# Patient Record
Sex: FEMALE | Race: BLACK OR AFRICAN AMERICAN | NOT HISPANIC OR LATINO | Employment: FULL TIME | URBAN - METROPOLITAN AREA
[De-identification: names, ages, dates, MRNs, and addresses within clinical notes are randomized per-mention and may not be internally consistent; named-entity substitution may affect disease eponyms.]

---

## 2017-02-07 ENCOUNTER — OFFICE VISIT (OUTPATIENT)
Dept: NEUROLOGY | Facility: CLINIC | Age: 42
End: 2017-02-07

## 2017-02-07 ENCOUNTER — APPOINTMENT (OUTPATIENT)
Dept: LAB | Facility: HOSPITAL | Age: 42
End: 2017-02-07

## 2017-02-07 VITALS
WEIGHT: 190 LBS | DIASTOLIC BLOOD PRESSURE: 80 MMHG | HEIGHT: 62 IN | SYSTOLIC BLOOD PRESSURE: 130 MMHG | BODY MASS INDEX: 34.96 KG/M2

## 2017-02-07 DIAGNOSIS — M54.16 LUMBAR RADICULOPATHY: ICD-10-CM

## 2017-02-07 DIAGNOSIS — R29.898 LEFT LEG WEAKNESS: Primary | ICD-10-CM

## 2017-02-07 DIAGNOSIS — R29.2 HYPERREFLEXIA OF LOWER EXTREMITY: ICD-10-CM

## 2017-02-07 LAB
ALBUMIN SERPL-MCNC: 4 G/DL (ref 3.2–4.8)
ALBUMIN/GLOB SERPL: 1.1 G/DL (ref 1.5–2.5)
ALP SERPL-CCNC: 111 U/L (ref 25–100)
ALT SERPL W P-5'-P-CCNC: 18 U/L (ref 7–40)
ANION GAP SERPL CALCULATED.3IONS-SCNC: 7 MMOL/L (ref 3–11)
AST SERPL-CCNC: 20 U/L (ref 0–33)
BASOPHILS # BLD AUTO: 0.01 10*3/MM3 (ref 0–0.2)
BASOPHILS NFR BLD AUTO: 0.2 % (ref 0–1)
BILIRUB SERPL-MCNC: 0.3 MG/DL (ref 0.3–1.2)
BUN BLD-MCNC: 7 MG/DL (ref 9–23)
BUN/CREAT SERPL: 11.7 (ref 7–25)
CALCIUM SPEC-SCNC: 9.9 MG/DL (ref 8.7–10.4)
CHLORIDE SERPL-SCNC: 104 MMOL/L (ref 99–109)
CO2 SERPL-SCNC: 27 MMOL/L (ref 20–31)
CREAT BLD-MCNC: 0.6 MG/DL (ref 0.6–1.3)
CRP SERPL-MCNC: 10.2 MG/DL (ref 0–10)
DEPRECATED RDW RBC AUTO: 50.6 FL (ref 37–54)
EOSINOPHIL # BLD AUTO: 0.1 10*3/MM3 (ref 0.1–0.3)
EOSINOPHIL NFR BLD AUTO: 1.8 % (ref 0–3)
ERYTHROCYTE [DISTWIDTH] IN BLOOD BY AUTOMATED COUNT: 15.3 % (ref 11.3–14.5)
GFR SERPL CREATININE-BSD FRML MDRD: 135 ML/MIN/1.73
GLOBULIN UR ELPH-MCNC: 3.6 GM/DL
GLUCOSE BLD-MCNC: 89 MG/DL (ref 70–100)
HCT VFR BLD AUTO: 35.5 % (ref 34.5–44)
HGB BLD-MCNC: 11.3 G/DL (ref 11.5–15.5)
IMM GRANULOCYTES # BLD: 0.01 10*3/MM3 (ref 0–0.03)
IMM GRANULOCYTES NFR BLD: 0.2 % (ref 0–0.6)
LYMPHOCYTES # BLD AUTO: 2.55 10*3/MM3 (ref 0.6–4.8)
LYMPHOCYTES NFR BLD AUTO: 44.7 % (ref 24–44)
MCH RBC QN AUTO: 28.8 PG (ref 27–31)
MCHC RBC AUTO-ENTMCNC: 31.8 G/DL (ref 32–36)
MCV RBC AUTO: 90.6 FL (ref 80–99)
MONOCYTES # BLD AUTO: 0.39 10*3/MM3 (ref 0–1)
MONOCYTES NFR BLD AUTO: 6.8 % (ref 0–12)
NEUTROPHILS # BLD AUTO: 2.64 10*3/MM3 (ref 1.5–8.3)
NEUTROPHILS NFR BLD AUTO: 46.3 % (ref 41–71)
PLATELET # BLD AUTO: 381 10*3/MM3 (ref 150–450)
PMV BLD AUTO: 9.8 FL (ref 6–12)
POTASSIUM BLD-SCNC: 4.4 MMOL/L (ref 3.5–5.5)
PROT SERPL-MCNC: 7.6 G/DL (ref 5.7–8.2)
RBC # BLD AUTO: 3.92 10*6/MM3 (ref 3.89–5.14)
SODIUM BLD-SCNC: 138 MMOL/L (ref 132–146)
TSH SERPL DL<=0.05 MIU/L-ACNC: 0.76 MIU/ML (ref 0.35–5.35)
VIT B12 BLD-MCNC: 530 PG/ML (ref 211–911)
WBC NRBC COR # BLD: 5.7 10*3/MM3 (ref 3.5–10.8)

## 2017-02-07 PROCEDURE — 85025 COMPLETE CBC W/AUTO DIFF WBC: CPT | Performed by: PSYCHIATRY & NEUROLOGY

## 2017-02-07 PROCEDURE — 83921 ORGANIC ACID SINGLE QUANT: CPT | Performed by: PSYCHIATRY & NEUROLOGY

## 2017-02-07 PROCEDURE — 80053 COMPREHEN METABOLIC PANEL: CPT | Performed by: PSYCHIATRY & NEUROLOGY

## 2017-02-07 PROCEDURE — 99204 OFFICE O/P NEW MOD 45 MIN: CPT | Performed by: PSYCHIATRY & NEUROLOGY

## 2017-02-07 PROCEDURE — 36415 COLL VENOUS BLD VENIPUNCTURE: CPT | Performed by: PSYCHIATRY & NEUROLOGY

## 2017-02-07 PROCEDURE — 82607 VITAMIN B-12: CPT | Performed by: PSYCHIATRY & NEUROLOGY

## 2017-02-07 PROCEDURE — 84443 ASSAY THYROID STIM HORMONE: CPT | Performed by: PSYCHIATRY & NEUROLOGY

## 2017-02-07 PROCEDURE — 86140 C-REACTIVE PROTEIN: CPT | Performed by: PSYCHIATRY & NEUROLOGY

## 2017-02-07 NOTE — PROGRESS NOTES
Subjective:     Patient ID: Yifan Bowman is a 41 y.o. female.    History of Present Illness  The following portions of the patient's history were reviewed and updated as appropriate: allergies, current medications, past family history, past medical history, past social history, past surgical history and problem list.  42 y/o BF complains of left leg pain, numbness, and weakness that causes to trip and fall for about 6 years. She thinks that at times the problem is getting worse. Denies any specific injury, thinks that this could be related to multiple epidurals for child birth. Reports intermittent bilateral hand numbness, denies significant headaches, neck pain, other symptoms..  Review of Systems   Constitutional: Negative for chills, fatigue, fever and unexpected weight change.   HENT: Negative for ear pain, hearing loss, nosebleeds, rhinorrhea and sore throat.    Eyes: Negative for photophobia, pain, discharge, itching and visual disturbance.   Respiratory: Negative for cough, chest tightness, shortness of breath and wheezing.    Cardiovascular: Negative for chest pain, palpitations and leg swelling.   Gastrointestinal: Negative for abdominal pain, blood in stool, constipation, diarrhea, nausea and vomiting.   Genitourinary: Negative for dysuria, frequency, hematuria and urgency.   Musculoskeletal: Negative for arthralgias, back pain, gait problem, joint swelling, myalgias, neck pain and neck stiffness.        JOINT STIFFNESS   Skin: Negative for rash and wound.   Allergic/Immunologic: Negative for environmental allergies and food allergies.   Neurological: Positive for numbness. Negative for dizziness, tremors, seizures, syncope, speech difficulty, weakness, light-headedness and headaches.        TINGLING, DIFFICULTY WALKING   Hematological: Negative for adenopathy. Bruises/bleeds easily.   Psychiatric/Behavioral: Negative for agitation, confusion, decreased concentration, hallucinations, sleep disturbance and  suicidal ideas. The patient is not nervous/anxious.         Objective:    Neurologic Exam     Mental Status   Oriented to person, place, and time.     Cranial Nerves     CN III, IV, VI   Pupils are equal, round, and reactive to light.  Extraocular motions are normal.     Motor Exam     Strength   Strength 5/5 throughout.       Physical Exam   Constitutional: She is oriented to person, place, and time. She appears well-developed and well-nourished.   HENT:   Head: Normocephalic and atraumatic.   Eyes: EOM are normal. Pupils are equal, round, and reactive to light.   Neck: Normal range of motion. Neck supple. Carotid bruit is not present.   Cardiovascular: Normal rate, regular rhythm, normal heart sounds and intact distal pulses.    Pulmonary/Chest: Effort normal and breath sounds normal.   Abdominal: Soft. Bowel sounds are normal.   Musculoskeletal: Normal range of motion.   Neurological: She is alert and oriented to person, place, and time. She has normal strength. No cranial nerve deficit or sensory deficit. She displays a negative Romberg sign. Coordination and gait normal. She displays no Babinski's sign on the right side. She displays no Babinski's sign on the left side.   DTRs brisk, left ankle clonus, bilateral Hoffmans, limps toward left side, has some difficulty with toe walking on the left, SLR negative.   Skin: Skin is warm.   Psychiatric: She has a normal mood and affect. Her behavior is normal. Thought content normal. Cognition and memory are normal.       Assessment/Plan:     Yifan was seen today for difficulty walking, numbness and back pain.    Diagnoses and all orders for this visit:    Left leg weakness  -     TSH  -     Vitamin B12  -     C-reactive Protein  -     Comprehensive Metabolic Panel  -     CBC & Differential  -     Methylmalonic Acid, Serum  -     Ambulatory Referral to Physical Therapy  -     MRI Brain Without Contrast  -     MRI Lumbar Spine Without Contrast    Lumbar  radiculopathy  -     MRI Lumbar Spine Without Contrast    Hyperreflexia of lower extremity  -     MRI Brain Without Contrast       MRI of brain is requested upon consideration of demyelinating disease, MRI of ls spine for suspected ls radiculopathy.

## 2017-02-10 ENCOUNTER — HOSPITAL ENCOUNTER (OUTPATIENT)
Dept: PHYSICAL THERAPY | Facility: HOSPITAL | Age: 42
Setting detail: THERAPIES SERIES
Discharge: HOME | End: 2017-02-10
Attending: PSYCHIATRY & NEUROLOGY

## 2017-02-10 DIAGNOSIS — R29.898 LEFT LEG WEAKNESS: Primary | ICD-10-CM

## 2017-02-10 LAB — METHYLMALONATE SERPL-SCNC: 111 NMOL/L (ref 0–378)

## 2017-02-10 PROCEDURE — 97110 THERAPEUTIC EXERCISES: CPT

## 2017-02-10 PROCEDURE — 97162 PT EVAL MOD COMPLEX 30 MIN: CPT

## 2017-02-10 NOTE — PROGRESS NOTES
Outpatient Physical Therapy Neuro Initial Evaluation  [facility]     Patient Name: Yifan Bowman  : 1975  MRN: [id number]  Today's Date: 2/10/2017      Visit Date: 02/10/2017    There is no problem list on file for this patient.       History reviewed. No pertinent past medical history.     History reviewed. No pertinent past surgical history.      Visit Dx:     ICD-10-CM ICD-9-CM   1. Left leg weakness M62.81 729.89             Patient History       02/10/17 1300          History    Chief Complaint Balance Problems;Difficulty Walking;Difficulty with daily activities;Falls/history of falls;Muscle weakness;Pain;Numbness  -DR      Brief Description of Current Complaint Mrs. Bowman is a 41 year old. She presents to clinic with complaints of LLE weakness and pain that has caused her to fall. She has been dealing with this issue for 6 years and beleive it might be the result of multiple epidurals secondary to child birth. She reports she has had 4-5 falls and recently fell at [facility] due to LLE becoming weak and falling over. She stands all day at work and is able to do so without difficulty, but afterwards she has pain in low back and LLE. She has 8 children; 4 biological and 4 are her cousins. 7 years ago she fell off a porch; once she fell off the porch and she did not seek medical.  Sleep is not disturbed, but takes times to get out of bed in the morning.  -DR      Onset Date- PT 2/10/17  -DR      Current Tobacco Use No  -DR      Smoking Status No  -DR      Occupation/sports/leisure activities  at [employer]  -DR      What clinical tests have you had for this problem? MRI   pending; scheduled for next Wednesday  -DR      Pain     Pain Location --   L lateral hip  -DR      Pain at Present 0  -DR      Pain at Best 0  -DR      Pain at Worst 6  -DR      Fall Risk Assessment    Any falls in the past year: Yes  -DR      Number of falls reported in the last 12 months 5  -DR      Factors  that contributed to the fall: Tripped  -DR      Does patient have a fear of falling Yes (comment)  -DR      Daily Activities    Primary Language English  -DR      Pt Participated in POC and Goals Yes  -DR      Safety    Are you being hurt, hit, or frightened by anyone at home or in your life? No  -DR      Are you being neglected by a caregiver No  -DR        User Key  (r) = Recorded By, (t) = Taken By, (c) = Cosigned By    Initials Name Provider Type    DR Dominick Musa, PT Physical Therapist                    PT Neuro       02/10/17 1400          Home Living    Living Arrangements condominium  -DR      Home Accessibility stairs within home  -DR      Number of Stairs Within Home 10  -DR      Sensation    Light Touch Partial deficits in the LLE  -DR      Additional Comments Pt reported decreased sensation L4 dermatome.  -DR      Proprioception    Proprioception WFL LLE  -DR      DTR- Lower Quarter Clearing    Patellar tendon (L2-4) Right:;2- Normal response;Left:;3- Slightly hyperactive response  -DR      Achilles tendon (S1-2) Right:;2- Normal response;Left:;3- Slightly hyperactive response  -DR      Posture/Observations    Alignment Options Rounded shoulders;Lumbar lordosis  -DR      Rounded Shoulders Bilateral:  -DR      Lumbar lordosis Bilateral:;Increased  -DR      Posture/Observations Comments anterior pelvic tilt. Assessed pt's leg length and no leg length discrepancy found.  -DR      Coordination    Coordination WNL? WNL  -DR      ROM (Range of Motion)    General ROM no range of motion deficits identified  -DR      MMT (Manual Muscle Testing)    General MMT Assessment lower extremity strength deficits identified  -DR      Left Hip    Hip Flexion Gross Movement (4/5) good  -DR      Hip Extension Gross Movement (4-/5) good minus  -DR      Hip ABduction Gross Movement (4/5) good  -DR      Right Hip    Hip Flexion Gross Movement (5/5) normal  -DR      Hip Extension Gross Movement (4+/5) good plus  -DR      Hip  ABduction Gross Movement (4+/5) good plus  -DR      Left Knee    Knee Extension Gross Movement (4/5) good  -DR      Knee Flexion Gross Movement (4/5) good  -DR      Right Knee    Knee Extension Gross Movement (5/5) normal  -DR      Knee Flexion Gross Movement (5/5) normal  -DR      Left Ankle/Foot    Ankle Dorsiflexion Gross Movement (4/5) good  -DR      Ext Hallucis Longus IP Ext Great Toe (4-/5) good minus  -DR      Right Ankle/Foot    Ankle Dorsiflexion Gross Movement (5/5) normal  -DR      Ext Hallucis Longus IP Ext Great Toe (5/5) normal  -DR      Bed Mobility, Assessment/Treatment    Bed Mobility, Comment Pt independent with bed mobility  -DR      Transfers    Transfer, Comment Pt independent with transfers, but requires increased time to perform.  -DR      Gait Assessment/Treatment    Gait, Comment Pt ambulates with decreased stance time on LLE, decreased step length LLE, circumduction during swing phase LLE and ocassional imbalance during dynamic gait.   -      Stairs Assessment/Treatment    Stairs, Comment Pt ascends/descends stairs with reciprocal pattern and 1 UE support.  -      Balance Skills Training    Training Strategies (Balance Skills) Pt standing feet together (horizontal head turns/vertical head turns/eyes closed x 30 seconds each; same as above split stance x 30 seconds ea. Instructed to add to HEP and perform in front of kitchen sink.  -        User Key  (r) = Recorded By, (t) = Taken By, (c) = Cosigned By    Initials Name Provider Type    DR Dominick Musa, PT Physical Therapist                        Therapy Education       02/10/17 9010    Therapy Education    Given HEP;Posture/body mechanics;Fall prevention and home safety;Mobility training  -DR    Program New  -    How Provided Verbal;Demonstration;Written  -    Provided to Patient  -DR    Level of Understanding Verbalized;Demonstrated  -      User Key  (r) = Recorded By, (t) = Taken By, (c) = Cosigned By    Initials Name  Provider Type    DR Dominick Musa, PT Physical Therapist                PT OP Goals       02/10/17 1636 02/10/17 1400       PT Short Term Goals    STG Date to Achieve  03/10/17  -     STG 1  Patient will be compliant with HEP.  -     STG 1 Progress  New  -     STG 2  Patient to perform TUG within 11 sec without LOB for improved functional mobility.  -     STG 2 Progress  New  -     STG 3  Patient to improve FGA score to >/= 20/30 to decrease client's risk of falls.  -     STG 3 Progress  New  -     STG 4  Patient to improve mini-BEST test balance score to >/= 20/28 to decrease client's risk of falls.  -     STG 4 Progress  New  -     Long Term Goals    LTG Date to Achieve  04/07/17  -     LTG 1  Patient will be independent with HEP.  -     LTG 1 Progress  New  -     LTG 2  Patient to perform TUG within 10 sec without LOB for improved functional mobility.  -     LTG 2 Progress  New  -     LTG 3  Patient to improve FGA score to >/= 23/30 to decrease client's risk of falls.  -     LTG 3 Progress  New  -DR     LTG 4  Patient to improve mini-BEST test balance score to >/= 23/28 to decrease client's risk of falls.  -     LTG 4 Progress  New  -     Time Calculation    PT Goal Re-Cert Due Date 03/10/17  -DR 03/10/17  -       User Key  (r) = Recorded By, (t) = Taken By, (c) = Cosigned By    Initials Name Provider Type    DR Dominick Musa, PT Physical Therapist                PT Assessment/Plan       02/10/17 1632          PT Assessment    Functional Limitations Decreased safety during functional activities;Impaired gait;Impaired locomotion;Limitation in home management;Limitations in community activities;Performance in leisure activities;Limitations in functional capacity and performance;Performance in work activities  -      Impairments Balance;Endurance;Gait;Impaired aerobic capacity;Poor body mechanics;Pain;Posture  -      Assessment Comments Pt presents with evolving symptoms  during PT evaluation. She demonstrated impaired gait with decreased stance LLE, circumduction LLE during swing phase and decreased step length. She also demonstrates static and dynamic balance deficits during performance of TUG, mini-best test and 10 meter walk test. She would benefit from further skilled PT intervention to address functional deficits and decrease risk of falls.  -      Please refer to paper survey for additional self-reported information Yes  -DR      Rehab Potential Good  -DR      Patient/caregiver participated in establishment of treatment plan and goals Yes  -DR      Patient would benefit from skilled therapy intervention Yes  -DR      PT Plan    PT Frequency 1x/week  -      Predicted Duration of Therapy Intervention (days/wks) 8 weeks  -      Planned CPT's? PT EVAL: 61009;PT RE-EVAL: 27253;PT THER PROC EA 15 MIN: 77502;PT THER ACT EA 15 MIN: 98260;PT MANUAL THERAPY EA 15 MIN: 69449;PT NEUROMUSC RE-EDUCATION EA 15 MIN: 91568;PT GAIT TRAINING EA 15 MIN: 08499;PT ELECTRICAL STIM UNATTEND: ;PT HOT/COLD PACK WC NONMCARE: 72001  -      PT Plan Comments Pt will be seen x1/wk for 8 weeks and treatment will include stretching, strengthening, manual therapy, neuromuscular re-education, balance and gait training. Pt has MRI for head and lumbar spine scheduled for next Wednesday 2/15/17.  -        User Key  (r) = Recorded By, (t) = Taken By, (c) = Cosigned By    Initials Name Provider Type    DR Dominick Musa, PT Physical Therapist                                   Outcome Measures       02/10/17 1600          10 Meter Walk Test Self-Selected Velocity    Self-Selected Velocity: Trial 1 13.3 sec.  -      Functional Gait Assessment (FGA)    Gait Level Surface 1  -DR      Change in Gait Speed 1  -DR      Gait with Horizontal Head Turns 2  -DR      Gait with Vertical Head Turns 3  -DR      Gait and Pivot Turn 3  -DR      Step Over Obstacle 3  -DR      Gait with Narrow Base of Support 0  -DR       Gait with Eyes Closed 1  -DR      Ambulating Backwards 1  -DR      Steps 2  -DR      FGA Total Score 17  -DR      Mini Best    Mini Best Score/Comments 17/28  -DR      Timed Up and Go (TUG)    TUG Test 1 12.4 seconds  -DR      TUG Test 2 22.5 seconds   dual task  -DR      Functional Assessment    Outcome Measure Options 10 Meter Walk;FGA (Functional Gait Assessment);Mini-Best Test;Timed Up and Go (TUG)  -DR        User Key  (r) = Recorded By, (t) = Taken By, (c) = Cosigned By    Initials Name Provider Type    DR Dominick Musa, PT Physical Therapist          Time Calculation:   Start Time: 1400  Total Timed Code Minutes- PT: 10 minute(s)     Therapy Charges for Today     Code Description Service Date Service Provider Modifiers Qty    96777021577  PT EVAL MOD COMPLEXITY 4 2/10/2017 Dominick Musa, PT GP 1    [id number]  PT THER PROC EA 15 MIN 2/10/2017 Dominick Musa, PT GP 1          PT G-Codes  Outcome Measure Options: 10 Meter Walk, FGA (Functional Gait Assessment), Mini-Best Test, Timed Up and Go (TUG)         Dominick Musa, PT  2/10/2017

## 2017-02-14 ENCOUNTER — APPOINTMENT (OUTPATIENT)
Dept: PHYSICAL THERAPY | Facility: HOSPITAL | Age: 42
End: 2017-02-14

## 2017-02-15 ENCOUNTER — HOSPITAL ENCOUNTER (OUTPATIENT)
Dept: MRI IMAGING | Facility: HOSPITAL | Age: 42
Discharge: HOME | End: 2017-02-15
Attending: PSYCHIATRY & NEUROLOGY | Admitting: PSYCHIATRY & NEUROLOGY

## 2017-02-15 ENCOUNTER — HOSPITAL ENCOUNTER (OUTPATIENT)
Dept: MRI IMAGING | Facility: HOSPITAL | Age: 42
Discharge: HOME | End: 2017-02-15
Attending: PSYCHIATRY & NEUROLOGY

## 2017-02-15 PROCEDURE — 70551 MRI BRAIN STEM W/O DYE: CPT

## 2017-02-15 PROCEDURE — 72148 MRI LUMBAR SPINE W/O DYE: CPT

## 2017-02-16 ENCOUNTER — TELEPHONE (OUTPATIENT)
Dept: NEUROLOGY | Facility: CLINIC | Age: 42
End: 2017-02-16

## 2017-02-16 ENCOUNTER — HOSPITAL ENCOUNTER (OUTPATIENT)
Dept: PHYSICAL THERAPY | Facility: HOSPITAL | Age: 42
Setting detail: THERAPIES SERIES
Discharge: HOME | End: 2017-02-16

## 2017-02-16 DIAGNOSIS — R29.898 LEFT LEG WEAKNESS: Primary | ICD-10-CM

## 2017-02-16 PROCEDURE — 97112 NEUROMUSCULAR REEDUCATION: CPT

## 2017-02-16 PROCEDURE — 97110 THERAPEUTIC EXERCISES: CPT

## 2017-02-16 NOTE — PROGRESS NOTES
Outpatient Physical Therapy Neuro Treatment Note  [facility]     Patient Name: Yifan Bowman  : 1975  MRN: [id number]  Today's Date: 2017      Visit Date: 2017    Visit Dx:    ICD-10-CM ICD-9-CM   1. Left leg weakness M62.81 729.89       There is no problem list on file for this patient.                PT Neuro       17 1310          Subjective Comments    Subjective Comments Pt reports she has been doing good since last visit; she had difficulty performing standing exercises in semi tandem.  -DR      Subjective Pain    Able to rate subjective pain? yes  -DR      Pre-Treatment Pain Level 0  -DR      Post-Treatment Pain Level 0  -DR      Balance Skills Training    Training Strategies (Balance Skills) In // bars: reviewed and performed standing split stance (horizontal head turns/vertical head turns/eyes closed) x 1 minute each; standing on rockerboard (laterally positioned) standing still x 1 minute, controlled lateral movements x 1 minute; standing on rocker board positioned A-P standing still x 1 minute, shifting forward/backward x 1 minute. Pt required CGA and ocassional min A to maintain upright posture. She also required frequent BUE support. Pt attempted to perform standing  on blue theradisc cone taps x 5 RLE; difficulty with LLE but able to complete 5 reps with verbal cues for sequencing.  -        User Key  (r) = Recorded By, (t) = Taken By, (c) = Cosigned By    Initials Name Provider Type    DR Dominick Musa, PT Physical Therapist                        PT Assessment/Plan       17 1300 02/10/17 1632       PT Assessment    Functional Limitations  Decreased safety during functional activities;Impaired gait;Impaired locomotion;Limitation in home management;Limitations in community activities;Performance in leisure activities;Limitations in functional capacity and performance;Performance in work activities  -     Impairments  Balance;Endurance;Gait;Impaired aerobic  capacity;Poor body mechanics;Pain;Posture  -DR     Assessment Comments Pt demonstrated difficulty maintaining static balance on uneven surface; required frequent BUE support on parallel bars and ocassional min A to maintain upright posture. Pt performed and added to HEP BLE strengthening exercises; she demonstrated understanding and ability to perform correctly.  - Pt presents with evolving symptoms during PT evaluation. She demonstrated impaired gait with decreased stance LLE, circumduction LLE during swing phase and decreased step length. She also demonstrates static and dynamic balance deficits during performance of TUG, mini-best test and 10 meter walk test. She would benefit from further skilled PT intervention to address functional deficits and decrease risk of falls.  -     Please refer to paper survey for additional self-reported information  Yes  -DR     Rehab Potential  Good  -DR     Patient/caregiver participated in establishment of treatment plan and goals  Yes  -DR     Patient would benefit from skilled therapy intervention  Yes  -DR     PT Plan    PT Frequency  1x/week  -     Predicted Duration of Therapy Intervention (days/wks)  8 weeks  -     Planned CPT's?  PT EVAL: 00289;PT RE-EVAL: 66742;PT THER PROC EA 15 MIN: 98657;PT THER ACT EA 15 MIN: 57097;PT MANUAL THERAPY EA 15 MIN: 25256;PT NEUROMUSC RE-EDUCATION EA 15 MIN: 77148;PT GAIT TRAINING EA 15 MIN: 68284;PT ELECTRICAL STIM UNATTEND: ;PT HOT/COLD PACK WC NONMCARE: 79199  -     PT Plan Comments Continue per POC.  - Pt will be seen x1/wk for 8 weeks and treatment will include stretching, strengthening, manual therapy, neuromuscular re-education, balance and gait training. Pt has MRI for head and lumbar spine scheduled for next Wednesday 2/15/17.  -       User Key  (r) = Recorded By, (t) = Taken By, (c) = Cosigned By    Initials Name Provider Type    DR Dominick Musa, PT Physical Therapist                     Exercises        02/16/17 1310          Subjective Comments    Subjective Comments Pt reports she has been doing good since last visit; she had difficulty performing standing exercises in semi tandem.  -      Subjective Pain    Able to rate subjective pain? yes  -DR      Pre-Treatment Pain Level 0  -DR      Post-Treatment Pain Level 0  -DR      Exercise 1    Exercise Name 1 NuStep L6  -DR      Time (Minutes) 1 8  -DR      Exercise 2    Exercise Name 2 Bridging-added to HEP  -DR      Reps 2 20  -DR      Exercise 3    Exercise Name 3 Wall Squats-added to HEP  -DR      Reps 3 15  -DR      Time (Minutes) 3 --  -DR      Exercise 4    Exercise Name 4 Standing Hip Abduction/Extension  -DR      Reps 4 10  -DR      Time (Minutes) 4 --  -DR      Exercise 5    Exercise Name 5 --  -DR      Resistance 5 --  -DR      Reps 5 --  -DR        User Key  (r) = Recorded By, (t) = Taken By, (c) = Cosigned By    Initials Name Provider Type    DR Dominick Musa, PT Physical Therapist                              PT OP Goals       02/10/17 1636 02/10/17 1400       PT Short Term Goals    STG Date to Achieve  03/10/17  -     STG 1  Patient will be compliant with HEP.  -     STG 1 Progress  New  -     STG 2  Patient to perform TUG within 11 sec without LOB for improved functional mobility.  -     STG 2 Progress  New  -     STG 3  Patient to improve FGA score to >/= 20/30 to decrease client's risk of falls.  -     STG 3 Progress  New  -     STG 4  Patient to improve mini-BEST test balance score to >/= 20/28 to decrease client's risk of falls.  -     STG 4 Progress  New  -     Long Term Goals    LTG Date to Achieve  04/07/17  -     LTG 1  Patient will be independent with HEP.  -     LTG 1 Progress  New  -     LTG 2  Patient to perform TUG within 10 sec without LOB for improved functional mobility.  -     LTG 2 Progress  New  -     LTG 3  Patient to improve FGA score to >/= 23/30 to decrease client's risk of falls.  -     LTG 3  Progress  New  -     LTG 4  Patient to improve mini-BEST test balance score to >/= 23/28 to decrease client's risk of falls.  -     LTG 4 Progress  New  -     Time Calculation    PT Goal Re-Cert Due Date 03/10/17  -DR 03/10/17  -       User Key  (r) = Recorded By, (t) = Taken By, (c) = Cosigned By    Initials Name Provider Type    DR Dominick Musa, PT Physical Therapist                Therapy Education       02/16/17 1355    Therapy Education    Given HEP;Pain management;Posture/body mechanics;Fall prevention and home safety;Mobility training  -DR    Program New   issued written copy and red theraband  -DR    How Provided Verbal;Demonstration;Written  -DR    Provided to Patient  -DR    Level of Understanding Verbalized;Demonstrated  -DR      02/10/17 1630    Therapy Education    Given HEP;Posture/body mechanics;Fall prevention and home safety;Mobility training  -DR    Program New  -DR    How Provided Verbal;Demonstration;Written  -DR    Provided to Patient  -DR    Level of Understanding Verbalized;Demonstrated  -DR      User Key  (r) = Recorded By, (t) = Taken By, (c) = Cosigned By    Initials Name Provider Type    DR Dominick Musa, PT Physical Therapist                Time Calculation:   Start Time: 1310  Total Timed Code Minutes- PT: 53 minute(s)     Therapy Charges for Today     Code Description Service Date Service Provider Modifiers Qty    [id number] HC PT THER PROC EA 15 MIN 2/16/2017 Dominick Musa, PT GP 2    [id number] HC PT NEUROMUSC RE EDUCATION EA 15 MIN 2/16/2017 Dominick Musa, PT GP 2                    Dominick Musa, PT  2/16/2017

## 2017-02-20 ENCOUNTER — TELEPHONE (OUTPATIENT)
Dept: NEUROLOGY | Facility: CLINIC | Age: 42
End: 2017-02-20

## 2017-02-20 NOTE — TELEPHONE ENCOUNTER
----- Message from Brady Franklin MD sent at 2/20/2017  3:01 PM EST -----  Regarding: MRIs  MRI of brain is suggestive of MS, MRI of ls spine with mild arthritis changes, keep f/u thanks.  ----- Message -----     From: [facility], Rad Results [place] In     Sent: 2/17/2017  11:25 AM       To: Brady Franklin MD

## 2017-03-01 ENCOUNTER — APPOINTMENT (OUTPATIENT)
Dept: PHYSICAL THERAPY | Facility: HOSPITAL | Age: 42
End: 2017-03-01

## 2017-03-23 ENCOUNTER — APPOINTMENT (OUTPATIENT)
Dept: LAB | Facility: HOSPITAL | Age: 42
End: 2017-03-23

## 2017-03-23 ENCOUNTER — OFFICE VISIT (OUTPATIENT)
Dept: NEUROLOGY | Facility: CLINIC | Age: 42
End: 2017-03-23

## 2017-03-23 VITALS
HEART RATE: 89 BPM | HEIGHT: 62 IN | DIASTOLIC BLOOD PRESSURE: 72 MMHG | WEIGHT: 190 LBS | SYSTOLIC BLOOD PRESSURE: 128 MMHG | BODY MASS INDEX: 34.96 KG/M2 | OXYGEN SATURATION: 99 %

## 2017-03-23 DIAGNOSIS — G95.9 CERVICAL MYELOPATHY (HCC): ICD-10-CM

## 2017-03-23 DIAGNOSIS — G35 MS (MULTIPLE SCLEROSIS) (HCC): Primary | ICD-10-CM

## 2017-03-23 DIAGNOSIS — M25.552 LEFT HIP PAIN: ICD-10-CM

## 2017-03-23 PROCEDURE — 86038 ANTINUCLEAR ANTIBODIES: CPT | Performed by: PSYCHIATRY & NEUROLOGY

## 2017-03-23 PROCEDURE — 99214 OFFICE O/P EST MOD 30 MIN: CPT | Performed by: PSYCHIATRY & NEUROLOGY

## 2017-03-23 PROCEDURE — 36415 COLL VENOUS BLD VENIPUNCTURE: CPT | Performed by: PSYCHIATRY & NEUROLOGY

## 2017-03-23 PROCEDURE — 82164 ANGIOTENSIN I ENZYME TEST: CPT | Performed by: PSYCHIATRY & NEUROLOGY

## 2017-03-23 PROCEDURE — 86618 LYME DISEASE ANTIBODY: CPT | Performed by: PSYCHIATRY & NEUROLOGY

## 2017-03-23 RX ORDER — GABAPENTIN 300 MG/1
300 CAPSULE ORAL 3 TIMES DAILY
Qty: 90 CAPSULE | Refills: 5 | Status: SHIPPED | OUTPATIENT
Start: 2017-03-23 | End: 2018-03-14

## 2017-03-23 RX ORDER — MELOXICAM 7.5 MG/1
7.5 TABLET ORAL DAILY
Qty: 30 TABLET | Refills: 5 | Status: SHIPPED | OUTPATIENT
Start: 2017-03-23 | End: 2017-07-11 | Stop reason: SDUPTHER

## 2017-03-23 NOTE — PROGRESS NOTES
"Subjective:     Patient ID: Yifan Bowman is a 41 y.o. female.    Back Pain   This is a chronic problem. The current episode started more than 1 year ago. The problem occurs intermittently. The problem is unchanged. Associated symptoms include numbness. Pertinent negatives include no abdominal pain, chest pain, dysuria, fever, headaches or weakness.   Lower Extremity Issue   This is a chronic problem. The current episode started more than 1 year ago. The problem occurs intermittently. The problem has been unchanged. Associated symptoms include numbness. Pertinent negatives include no abdominal pain, arthralgias, chest pain, chills, coughing, fatigue, fever, headaches, joint swelling, myalgias, nausea, neck pain, rash, sore throat, vomiting or weakness.   Gait Problem   This is a chronic problem. The current episode started more than 1 year ago. The problem occurs constantly. The problem has been unchanged. Associated symptoms include numbness. Pertinent negatives include no abdominal pain, arthralgias, chest pain, chills, coughing, fatigue, fever, headaches, joint swelling, myalgias, nausea, neck pain, rash, sore throat, vomiting or weakness.     The following portions of the patient's history were reviewed and updated as appropriate: allergies, current medications, past family history, past medical history, past social history, past surgical history and problem list.  Patient still has left hip and leg pain, worse with activity, denies new symptoms. She has not had an eye exam. She denies family history of MS. Her lumbar MRI showed minor arthritis changes, MRI of brain suggested chronic demyelinating periventricular plaque changes and a \"black hole\" on the left.  Review of Systems   Constitutional: Negative for chills, fatigue, fever and unexpected weight change.   HENT: Negative for ear pain, hearing loss, nosebleeds, rhinorrhea and sore throat.    Eyes: Negative for photophobia, pain, discharge, itching and " visual disturbance.   Respiratory: Negative for cough, chest tightness, shortness of breath and wheezing.    Cardiovascular: Negative for chest pain, palpitations and leg swelling.   Gastrointestinal: Negative for abdominal pain, blood in stool, constipation, diarrhea, nausea and vomiting.   Genitourinary: Negative for dysuria, frequency, hematuria and urgency.   Musculoskeletal: Negative for arthralgias, back pain, gait problem, joint swelling, myalgias, neck pain and neck stiffness.   Skin: Negative for rash and wound.   Allergic/Immunologic: Negative for environmental allergies and food allergies.   Neurological: Positive for numbness. Negative for dizziness, tremors, seizures, syncope, speech difficulty, weakness, light-headedness and headaches.        TINGLING   Hematological: Negative for adenopathy. Does not bruise/bleed easily.   Psychiatric/Behavioral: Negative for agitation, confusion, decreased concentration, hallucinations, sleep disturbance and suicidal ideas. The patient is not nervous/anxious.         Objective:    Neurologic Exam     Mental Status   Oriented to person, place, and time.     Cranial Nerves     CN III, IV, VI   Pupils are equal, round, and reactive to light.  Extraocular motions are normal.     Motor Exam     Strength   Strength 5/5 throughout.       Physical Exam   Constitutional: She is oriented to person, place, and time. She appears well-developed and well-nourished.   HENT:   Head: Normocephalic and atraumatic.   Eyes: EOM are normal. Pupils are equal, round, and reactive to light.   Neck: Normal range of motion. Neck supple. Carotid bruit is not present.   Cardiovascular: Normal rate, regular rhythm, normal heart sounds and intact distal pulses.    Pulmonary/Chest: Effort normal and breath sounds normal.   Abdominal: Soft. Bowel sounds are normal.   Musculoskeletal:   Left hip a bit tender.   Neurological: She is alert and oriented to person, place, and time. She has normal  strength. No cranial nerve deficit or sensory deficit. She displays a negative Romberg sign. Coordination and gait normal. She displays no Babinski's sign on the right side. She displays no Babinski's sign on the left side.   Spastic gait, DTRs brisk.   Skin: Skin is warm.   Psychiatric: She has a normal mood and affect. Her behavior is normal. Thought content normal. Cognition and memory are normal.       Assessment/Plan:     Yifan was seen today for difficulty walking, numbness and back pain.    Diagnoses and all orders for this visit:    MS (multiple sclerosis)  -     MRI Brain With & Without Contrast  -     MRI Cervical Spine With & Without Contrast  -     Antinuclear Antibody With Reflex [place]  -     B. Burgdorferi Antibodies, WB Reflex  -     Angiotensin Converting Enzyme  -     Ambulatory Referral to Ophthalmology    Cervical myelopathy  -     MRI Brain With & Without Contrast  -     MRI Cervical Spine With & Without Contrast    Left hip pain  -     gabapentin (NEURONTIN) 300 MG capsule; Take 1 capsule by mouth 3 (Three) Times a Day.  -     meloxicam (MOBIC) 7.5 MG tablet; Take 1 tablet by mouth Daily.       We discussed starting Aubagio on return. She needs an eye exam, knows to call for problems.

## 2017-03-24 ENCOUNTER — TELEPHONE (OUTPATIENT)
Dept: NEUROLOGY | Facility: CLINIC | Age: 42
End: 2017-03-24

## 2017-03-24 LAB
ACE SERPL-CCNC: 45 U/L (ref 14–82)
ANA SER QL: NEGATIVE
B BURGDOR IGG+IGM SER-ACNC: <0.91 ISR (ref 0–0.9)
B BURGDOR IGM SER-ACNC: <0.8 INDEX (ref 0–0.79)
Lab: NORMAL

## 2017-03-24 NOTE — TELEPHONE ENCOUNTER
PT CALLED IN AND STATED THAT THE LAST TIME SHE HAD MRI'S DONE DR. LAURENT CALLED HER IN 2 XANAX AND SHE IS HAVING TEST DONE ON 03/30/2017 AND WANTING THEM CALLED IN. PLEASE ADVISE THANKS

## 2017-03-28 RX ORDER — ALPRAZOLAM 0.5 MG/1
0.5 TABLET ORAL AS NEEDED
Qty: 2 TABLET | Refills: 0 | OUTPATIENT
Start: 2017-03-28 | End: 2017-07-13

## 2017-03-30 ENCOUNTER — HOSPITAL ENCOUNTER (OUTPATIENT)
Dept: MRI IMAGING | Facility: HOSPITAL | Age: 42
Discharge: HOME | End: 2017-03-30
Attending: PSYCHIATRY & NEUROLOGY

## 2017-03-30 ENCOUNTER — HOSPITAL ENCOUNTER (OUTPATIENT)
Dept: MRI IMAGING | Facility: HOSPITAL | Age: 42
Discharge: HOME | End: 2017-03-30
Attending: PSYCHIATRY & NEUROLOGY | Admitting: PSYCHIATRY & NEUROLOGY

## 2017-03-30 PROCEDURE — 72156 MRI NECK SPINE W/O & W/DYE: CPT

## 2017-03-30 PROCEDURE — 0 GADOBENATE DIMEGLUMINE 529 MG/ML SOLUTION: Performed by: PSYCHIATRY & NEUROLOGY

## 2017-03-30 PROCEDURE — 70553 MRI BRAIN STEM W/O & W/DYE: CPT

## 2017-03-30 PROCEDURE — A9577 INJ MULTIHANCE: HCPCS | Performed by: PSYCHIATRY & NEUROLOGY

## 2017-03-30 RX ADMIN — GADOBENATE DIMEGLUMINE 18 ML: 529 INJECTION, SOLUTION INTRAVENOUS at 10:24

## 2017-04-03 ENCOUNTER — TELEPHONE (OUTPATIENT)
Dept: NEUROLOGY | Facility: CLINIC | Age: 42
End: 2017-04-03

## 2017-04-03 NOTE — TELEPHONE ENCOUNTER
Please notify patient mri brain and cervical spine due show MS lesions but no active/aggressive lesions. Dr. Franklin plans to start patient on Aubagio at follow up appointment. thanks

## 2017-04-28 ENCOUNTER — HOSPITAL ENCOUNTER (EMERGENCY)
Facility: HOSPITAL | Age: 42
Discharge: HOME | End: 2017-04-29
Attending: EMERGENCY MEDICINE | Admitting: EMERGENCY MEDICINE
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
## 2017-04-28 DIAGNOSIS — S70.02XA CONTUSION OF LEFT HIP, INITIAL ENCOUNTER: Primary | ICD-10-CM

## 2017-04-28 PROCEDURE — 99283 EMERGENCY DEPT VISIT LOW MDM: CPT

## 2017-04-29 ENCOUNTER — APPOINTMENT (OUTPATIENT)
Dept: GENERAL RADIOLOGY | Facility: HOSPITAL | Age: 42
End: 2017-04-29

## 2017-04-29 VITALS
BODY MASS INDEX: 36.8 KG/M2 | HEART RATE: 100 BPM | RESPIRATION RATE: 20 BRPM | DIASTOLIC BLOOD PRESSURE: 89 MMHG | HEIGHT: 62 IN | WEIGHT: 200 LBS | TEMPERATURE: 98.1 F | SYSTOLIC BLOOD PRESSURE: 126 MMHG | OXYGEN SATURATION: 98 %

## 2017-04-29 PROCEDURE — 72170 X-RAY EXAM OF PELVIS: CPT

## 2017-04-29 RX ORDER — HYDROCODONE BITARTRATE AND ACETAMINOPHEN 5; 325 MG/1; MG/1
1 TABLET ORAL EVERY 6 HOURS PRN
Qty: 15 TABLET | Refills: 0 | Status: SHIPPED | OUTPATIENT
Start: 2017-04-29 | End: 2017-07-13

## 2017-04-29 RX ORDER — ORPHENADRINE CITRATE 100 MG/1
100 TABLET, EXTENDED RELEASE ORAL 2 TIMES DAILY
Qty: 14 TABLET | Refills: 0 | Status: SHIPPED | OUTPATIENT
Start: 2017-04-29 | End: 2017-07-13

## 2017-05-04 ENCOUNTER — OFFICE VISIT (OUTPATIENT)
Dept: NEUROLOGY | Facility: CLINIC | Age: 42
End: 2017-05-04

## 2017-05-04 VITALS
BODY MASS INDEX: 34.96 KG/M2 | WEIGHT: 190 LBS | DIASTOLIC BLOOD PRESSURE: 86 MMHG | HEART RATE: 72 BPM | SYSTOLIC BLOOD PRESSURE: 129 MMHG | HEIGHT: 62 IN

## 2017-05-04 DIAGNOSIS — G95.9 CERVICAL MYELOPATHY (HCC): ICD-10-CM

## 2017-05-04 DIAGNOSIS — G35 MS (MULTIPLE SCLEROSIS) (HCC): Primary | ICD-10-CM

## 2017-05-04 DIAGNOSIS — M25.552 LEFT HIP PAIN: ICD-10-CM

## 2017-05-04 PROCEDURE — 99214 OFFICE O/P EST MOD 30 MIN: CPT | Performed by: PSYCHIATRY & NEUROLOGY

## 2017-05-04 RX ORDER — PREDNISONE 10 MG/1
TABLET ORAL
Qty: 30 TABLET | Refills: 1 | Status: SHIPPED | OUTPATIENT
Start: 2017-05-04 | End: 2017-07-13

## 2017-05-22 DIAGNOSIS — G35 MS (MULTIPLE SCLEROSIS) (HCC): ICD-10-CM

## 2017-06-09 ENCOUNTER — TELEPHONE (OUTPATIENT)
Dept: NEUROLOGY | Facility: CLINIC | Age: 42
End: 2017-06-09

## 2017-06-28 ENCOUNTER — DOCUMENTATION (OUTPATIENT)
Dept: PHYSICAL THERAPY | Facility: HOSPITAL | Age: 42
End: 2017-06-28

## 2017-06-28 DIAGNOSIS — R29.898 LEFT LEG WEAKNESS: Primary | ICD-10-CM

## 2017-06-28 NOTE — THERAPY DISCHARGE NOTE
Outpatient Physical Therapy Discharge Summary         Patient Name: Yifan Bowman  : 1975  MRN: [id number]    Today's Date: 2017    Visit Dx:    ICD-10-CM ICD-9-CM   1. Left leg weakness M62.81 729.89             PT OP Goals       17 0800       PT Short Term Goals    STG Date to Achieve 03/10/17  -DR     STG 1 Patient will be compliant with HEP.  -     STG 1 Progress Not Met  -DR     STG 2 Patient to perform TUG within 11 sec without LOB for improved functional mobility.  -DR     STG 2 Progress Not Met  -DR     STG 3 Patient to improve FGA score to >/= 20/30 to decrease client's risk of falls.  -     STG 3 Progress Not Met  -DR     STG 4 Patient to improve mini-BEST test balance score to >/= 20/28 to decrease client's risk of falls.  -     STG 4 Progress Not Met  -DR     Long Term Goals    LTG Date to Achieve 17  -     LTG 1 Patient will be independent with HEP.  -     LTG 1 Progress Not Met  -DR     LTG 2 Patient to perform TUG within 10 sec without LOB for improved functional mobility.  -     LTG 2 Progress Not Met  -     LTG 3 Patient to improve FGA score to >/= 23/30 to decrease client's risk of falls.  -     LTG 3 Progress Not Met  -DR     LTG 4 Patient to improve mini-BEST test balance score to >/= 23/28 to decrease client's risk of falls.  -     LTG 4 Progress Not Met  -DR       User Key  (r) = Recorded By, (t) = Taken By, (c) = Cosigned By    Initials Name Provider Type    DR Dominick Musa, PT Physical Therapist          OP PT Discharge Summary  Date of Discharge: 17  Reason for Discharge: Non-compliant  Outcomes Achieved: Refer to plan of care for updates on goals achieved  Discharge Destination: Home with home program  Discharge Instructions: Pt attended initial evaluation 2/10/17 and follow up visit 17, but did not return for further follow up visits. She will be discharged from skilled PT at this time.                         Dominick Musa,  PT  6/28/2017

## 2017-07-11 ENCOUNTER — OFFICE VISIT (OUTPATIENT)
Dept: NEUROLOGY | Facility: CLINIC | Age: 42
End: 2017-07-11

## 2017-07-11 VITALS
HEIGHT: 62 IN | BODY MASS INDEX: 33.13 KG/M2 | SYSTOLIC BLOOD PRESSURE: 134 MMHG | WEIGHT: 180 LBS | DIASTOLIC BLOOD PRESSURE: 87 MMHG

## 2017-07-11 DIAGNOSIS — M25.552 LEFT HIP PAIN: ICD-10-CM

## 2017-07-11 DIAGNOSIS — G35 MS (MULTIPLE SCLEROSIS) (HCC): Primary | ICD-10-CM

## 2017-07-11 PROCEDURE — 99213 OFFICE O/P EST LOW 20 MIN: CPT | Performed by: PSYCHIATRY & NEUROLOGY

## 2017-07-11 RX ORDER — MELOXICAM 15 MG/1
15 TABLET ORAL DAILY
Qty: 30 TABLET | Refills: 5 | Status: SHIPPED | OUTPATIENT
Start: 2017-07-11 | End: 2017-08-18

## 2017-07-11 NOTE — PROGRESS NOTES
Subjective:     Patient ID: Yifan Bowman is a 42 y.o. female.    History of Present Illness  The following portions of the patient's history were reviewed and updated as appropriate: allergies, current medications, past family history, past medical history, past social history, past surgical history and problem list.  Still has left hip not better with prednisone, mostly when walking, tolerating Aubagio, has been going to PT who think it is related to gait and posture, has some low back ache, denies radicular leg pain, has some abdominal spasms.  Review of Systems   Constitutional: Negative for chills, fatigue, fever and unexpected weight change.   HENT: Negative for ear pain, hearing loss, nosebleeds, rhinorrhea and sore throat.    Eyes: Negative for photophobia, pain, discharge, itching and visual disturbance.   Respiratory: Negative for cough, chest tightness, shortness of breath and wheezing.    Cardiovascular: Negative for chest pain, palpitations and leg swelling.   Gastrointestinal: Negative for abdominal pain, blood in stool, constipation, diarrhea, nausea and vomiting.   Genitourinary: Negative for dysuria, frequency, hematuria and urgency.   Musculoskeletal: Positive for back pain and gait problem. Negative for arthralgias, joint swelling, myalgias, neck pain and neck stiffness.   Skin: Negative for rash and wound.   Allergic/Immunologic: Negative for environmental allergies and food allergies.   Neurological: Positive for weakness. Negative for dizziness, tremors, seizures, syncope, speech difficulty, light-headedness, numbness and headaches.   Hematological: Negative for adenopathy. Does not bruise/bleed easily.   Psychiatric/Behavioral: Negative for agitation, confusion, decreased concentration, hallucinations, sleep disturbance and suicidal ideas. The patient is not nervous/anxious.         Objective:    Neurologic Exam     Mental Status   Oriented to person, place, and time.       Physical Exam    Constitutional: She is oriented to person, place, and time. She appears well-developed and well-nourished.   Cardiovascular: Normal rate and regular rhythm.    Pulmonary/Chest: Effort normal.   Neurological: She is alert and oriented to person, place, and time. She has normal reflexes.   Somewhat spastic waddling gait.   Psychiatric: She has a normal mood and affect. Her behavior is normal. Thought content normal.       Assessment/Plan:     Yifan was seen today for multiple sclerosis and difficulty walking.    Diagnoses and all orders for this visit:    MS (multiple sclerosis)  -     Comprehensive Metabolic Panel  -     CBC & Differential    Left hip pain  -     meloxicam (MOBIC) 15 MG tablet; Take 1 tablet by mouth Daily.  -     Ambulatory Referral to Orthopedic Surgery       Encouraged to continue PT, call for problems.

## 2017-07-13 ENCOUNTER — OFFICE VISIT (OUTPATIENT)
Dept: ORTHOPEDIC SURGERY | Facility: CLINIC | Age: 42
End: 2017-07-13

## 2017-07-13 VITALS
BODY MASS INDEX: 35.08 KG/M2 | HEART RATE: 86 BPM | DIASTOLIC BLOOD PRESSURE: 95 MMHG | SYSTOLIC BLOOD PRESSURE: 126 MMHG | HEIGHT: 63 IN | WEIGHT: 198 LBS

## 2017-07-13 DIAGNOSIS — G35 MS (MULTIPLE SCLEROSIS) (HCC): ICD-10-CM

## 2017-07-13 DIAGNOSIS — M76.892 TENDONITIS OF LEFT HIP FLEXOR: Primary | ICD-10-CM

## 2017-07-13 DIAGNOSIS — E66.9 OBESITY (BMI 30-39.9): ICD-10-CM

## 2017-07-13 PROCEDURE — 99243 OFF/OP CNSLTJ NEW/EST LOW 30: CPT | Performed by: ORTHOPAEDIC SURGERY

## 2017-07-13 NOTE — PROGRESS NOTES
Orthopaedic Clinic Note: Hip New Patient    Chief Complaint   Patient presents with   • Left Hip - Pain        HPI    Consult from Brady Franklin M.D.    Yifan Bowman is a 42 y.o. female who presents with left hip pain for 3 year(s). Onset Began after a fall in which she landed on her left hip and had some localized pain to the gluteal region.  This pain is gradually resolved, but she is had some ongoing anterior-based hip pain that is worse with weightbearing.  She is currently being followed by a neurologist who has diagnosed her with multiple sclerosis given multiple neurologic findings including upper motor neuron lesions.  Patient presents today for evaluation of left hip pain.  She states her pain is worse with walking.  Sitting and resting ease her pain.  Previous treatments have included Mobic, physical therapy, and oral steroids.  She rates her pain a 6/10 on the pain scale.  Patient states that she has had some limitations in her activities as a result of her ongoing neurologic issues including left lower extremity weakness.  The hip pain itself is not severely limiting, but is bothersome.  She denies fevers chills or constitutional symptoms.      Past Medical History:   Diagnosis Date   • MS (multiple sclerosis) 3/23/2017      Past Surgical History:   Procedure Laterality Date   •  SECTION     • REDUCTION MAMMAPLASTY        Social History     Social History   • Marital status: Single     Spouse name: N/A   • Number of children: N/A   • Years of education: N/A     Occupational History   • Not on file.     Social History Main Topics   • Smoking status: Never Smoker   • Smokeless tobacco: Never Used   • Alcohol use No   • Drug use: No   • Sexual activity: Defer     Other Topics Concern   • Not on file     Social History Narrative      Current Outpatient Prescriptions on File Prior to Visit   Medication Sig Dispense Refill   • gabapentin (NEURONTIN) 300 MG capsule Take 1 capsule by mouth 3  "(Three) Times a Day. 90 capsule 5   • meloxicam (MOBIC) 15 MG tablet Take 1 tablet by mouth Daily. 30 tablet 5   • Teriflunomide 7 MG tablet Take 7 mg by mouth Daily. 30 tablet 11   • [DISCONTINUED] ALPRAZolam (XANAX) 0.5 MG tablet Take 1 tablet by mouth As Needed for Anxiety. 2 tablet 0   • [DISCONTINUED] HYDROcodone-acetaminophen (NORCO) 5-325 MG per tablet Take 1 tablet by mouth Every 6 (Six) Hours As Needed for Moderate Pain (4-6). 15 tablet 0   • [DISCONTINUED] orphenadrine (NORFLEX) 100 MG 12 hr tablet Take 1 tablet by mouth 2 (Two) Times a Day. 14 tablet 0   • [DISCONTINUED] predniSONE (DELTASONE) 10 MG tablet 4/dx3d, 3/dx3d, 2/dx3d, 1/x3d 30 tablet 1     No current facility-administered medications on file prior to visit.       Allergies   Allergen Reactions   • Benadryl [Diphenhydramine]         Review of Systems   HENT: Negative.    Eyes: Negative.    Respiratory: Negative.    Cardiovascular: Negative.    Gastrointestinal: Negative.    Endocrine: Negative.    Genitourinary: Negative.    Musculoskeletal: Positive for arthralgias.   Skin: Negative.    Allergic/Immunologic: Negative.    Neurological: Positive for weakness.   Hematological: Negative.    Psychiatric/Behavioral: Negative.         The following portions of the patient's history were reviewed and updated as appropriate: allergies, current medications, past family history, past medical history, past social history, past surgical history and problem list.    Physical Exam  Blood pressure 126/95, pulse 86, height 63\" (160 cm), weight 198 lb (89.8 kg).    Body mass index is 35.07 kg/(m^2).    GENERAL APPEARANCE: awake, alert & oriented x 3, in no acute distress, well developed, well nourished and obese  PSYCH: normal affect  LUNGS:  breathing nonlabored  EYES: sclera anicteric  CARDIOVASCULAR: palpable dorsalis pedis, palpable posterior tibial bilaterally. Capillary refill less than 2 seconds  EXTREMITIES: no clubbing, cyanosis  GAIT:  Normal         "   Right Hip Exam:  RANGE OF MOTION:   FLEXION CONTRACTURE: None   FLEXION: 110 degrees   INTERNAL ROTATION: 20 degrees at 90 degrees of flexion   EXTERNAL ROTATION: 40 degrees at 90 degrees of flexion    PAIN WITH HIP MOTION: no  PAIN WITH LOGROLL: no  STINCHFIELD TEST: negative    KNEE EXAM: full knee ROM (0-130), stable to varus/valgus stress at 0 and 30 degrees     STRENGTH:  5/5 hip adduction, abduction, flexion. 5/5 strength knee flexion, extension. 5/5 strength ankle dorsiflexion and plantarflexion.     GREATER TROCHANTER BURSAL PAIN:  no     REFLEXES:   PATELLAR 3/4   ACHILLES 4/4    CLONUS: Positive  STRAIGHT LEG TEST:   negative    SENSATION TO LIGHT TOUCH:  DEEP PERONEAL/SUPERFICIAL PERONEAL/SURAL/SAPHENOUS/TIBIAL:  intact    EDEMA:   no  ERYTHEMA:  no  WOUNDS/INCISIONS: none, no overlying skin problems.      Left Hip Exam:   RANGE OF MOTION:   FLEXION CONTRACTURE: None   FLEXION: 110 degrees   INTERNAL ROTATION: 20 degrees at 90 degrees of flexion   EXTERNAL ROTATION: 40 degrees at 90 degrees of flexion    PAIN WITH HIP MOTION: no  PAIN WITH LOGROLL: no  STINCHFIELD TEST: negative  Positive tenderness to palpation about quadriceps musculature.  She also has positive pain related with resisted knee extension and palpable tenderness to palpation about the proximal quadriceps which reproduces her pain    KNEE EXAM: full knee ROM (0-130), stable to varus/valgus stress at 0 and 30 degrees     STRENGTH:  4/5 hip adduction, abduction, flexion. 5/5 strength knee flexion, extension. 5/5 strength ankle dorsiflexion and plantarflexion.     GREATER TROCHANTER BURSAL PAIN:  Yes, mild     REFLEXES:   PATELLAR 3/4   ACHILLES 4/4    CLONUS: Positive  STRAIGHT LEG TEST:   negative    SENSATION TO LIGHT TOUCH:  DEEP PERONEAL/SUPERFICIAL PERONEAL/SURAL/SAPHENOUS/TIBIAL:  intact    EDEMA:   no  ERYTHEMA:  no  WOUNDS/INCISIONS: none, no overlying skin  problems.      ------------------------------------------------------------------    LEG LENGTHS:  equal  _____________________________________________________  _____________________________________________________    RADIOGRAPHIC FINDINGS:   Radiographs from 4/29/17 were personally reviewed of the pelvis and bilateral hips.  Radiographs demonstrate minimal arthritic findings no significant joint space narrowing.  Femoral heads are concentrically reduced within the acetabuluae    Assessment/Plan:   Diagnosis Plan   1. Tendonitis of left hip flexor     2. MS (multiple sclerosis)     3. Obesity (BMI 30-39.9)       I discussed with the patient that it does not appear that her hip joint itself is the source of her problem.  Her symptoms appear to be reproduced whenever her quadriceps muscle is firing.  This appears to be consistent with quadriceps myositis as well as likely tendinitis as a result of her ongoing neurologic issues.  I recommended physical therapy for stretching and strengthening of the muscles, however I do not suspect that her symptoms will improve until her myelopathic symptoms have been treated.  I do not recommend any surgical intervention in regards to her hip.  I'll defer back to her neurologist regarding treatment of her myelopathic issues and possible referral to a spine surgeon if indicated.  She'll follow up when necessary    Donaldo Saunders MD  07/13/17  10:40 AM

## 2017-08-03 ENCOUNTER — TELEPHONE (OUTPATIENT)
Dept: NEUROLOGY | Facility: CLINIC | Age: 42
End: 2017-08-03

## 2017-08-03 NOTE — TELEPHONE ENCOUNTER
PLEASE CONTACT PT AND CHECK TO MAKE SURE IF SHE IS TAKING AUBAGIO THE MS MEDICATION.     IF SHE IS NOT LET ME KNOW.    IF SHE SHE IS LET THEM KNOW THAT SOME REQUIREMENTS WITH THE MS MEDICATION AUBAGIO IS THAT THEY MUST HAVE A TB SKIN TEST AND ALSO MONTHLY LABS.    DR. LAURENT WILL ORDER THE LABS AND WE WILL FAX THEM.    AS FAR AS THE TB SKIN TEST THEY CAN GET THAT FROM THEIR PCP. OR WE CAN ORDER THAT AND HAVE THEM DONE. THANK YOU!

## 2017-08-04 NOTE — TELEPHONE ENCOUNTER
Spoke with pt.  Advised of monthly labs. And TB test requirement.  Pt said she does not have a PCP.  Advised pt to go to health dept for TB test.  Pt said that with her last TB test, she was asked if she had ever had a positive, she told them no, and was not given the test.  Advised pt to go to health dept and ask for the test anyway.  Advised pt to let us know the results.

## 2017-08-18 ENCOUNTER — LAB (OUTPATIENT)
Dept: LAB | Facility: HOSPITAL | Age: 42
End: 2017-08-18

## 2017-08-18 ENCOUNTER — OFFICE VISIT (OUTPATIENT)
Dept: NEUROLOGY | Facility: CLINIC | Age: 42
End: 2017-08-18

## 2017-08-18 VITALS
OXYGEN SATURATION: 99 % | SYSTOLIC BLOOD PRESSURE: 124 MMHG | WEIGHT: 203 LBS | BODY MASS INDEX: 35.97 KG/M2 | HEIGHT: 63 IN | DIASTOLIC BLOOD PRESSURE: 80 MMHG | HEART RATE: 87 BPM

## 2017-08-18 DIAGNOSIS — G35 MS (MULTIPLE SCLEROSIS) (HCC): Primary | ICD-10-CM

## 2017-08-18 DIAGNOSIS — G35 MS (MULTIPLE SCLEROSIS) (HCC): ICD-10-CM

## 2017-08-18 PROBLEM — G95.9 CERVICAL MYELOPATHY (HCC): Status: RESOLVED | Noted: 2017-03-23 | Resolved: 2017-08-18

## 2017-08-18 LAB
ALBUMIN SERPL-MCNC: 4.1 G/DL (ref 3.2–4.8)
ALBUMIN/GLOB SERPL: 1.1 G/DL (ref 1.5–2.5)
ALP SERPL-CCNC: 135 U/L (ref 25–100)
ALT SERPL W P-5'-P-CCNC: 14 U/L (ref 7–40)
ANION GAP SERPL CALCULATED.3IONS-SCNC: 7 MMOL/L (ref 3–11)
AST SERPL-CCNC: 17 U/L (ref 0–33)
BASOPHILS # BLD AUTO: 0.02 10*3/MM3 (ref 0–0.2)
BASOPHILS NFR BLD AUTO: 0.4 % (ref 0–1)
BILIRUB SERPL-MCNC: 0.3 MG/DL (ref 0.3–1.2)
BUN BLD-MCNC: 9 MG/DL (ref 9–23)
BUN/CREAT SERPL: 15 (ref 7–25)
CALCIUM SPEC-SCNC: 9.5 MG/DL (ref 8.7–10.4)
CHLORIDE SERPL-SCNC: 104 MMOL/L (ref 99–109)
CO2 SERPL-SCNC: 27 MMOL/L (ref 20–31)
CREAT BLD-MCNC: 0.6 MG/DL (ref 0.6–1.3)
DEPRECATED RDW RBC AUTO: 48.1 FL (ref 37–54)
EOSINOPHIL # BLD AUTO: 0.17 10*3/MM3 (ref 0–0.3)
EOSINOPHIL NFR BLD AUTO: 3.2 % (ref 0–3)
ERYTHROCYTE [DISTWIDTH] IN BLOOD BY AUTOMATED COUNT: 14.9 % (ref 11.3–14.5)
GFR SERPL CREATININE-BSD FRML MDRD: 134 ML/MIN/1.73
GLOBULIN UR ELPH-MCNC: 3.6 GM/DL
GLUCOSE BLD-MCNC: 90 MG/DL (ref 70–100)
HCT VFR BLD AUTO: 35 % (ref 34.5–44)
HGB BLD-MCNC: 11.2 G/DL (ref 11.5–15.5)
IMM GRANULOCYTES # BLD: 0 10*3/MM3 (ref 0–0.03)
IMM GRANULOCYTES NFR BLD: 0 % (ref 0–0.6)
LYMPHOCYTES # BLD AUTO: 1.98 10*3/MM3 (ref 0.6–4.8)
LYMPHOCYTES NFR BLD AUTO: 36.9 % (ref 24–44)
MCH RBC QN AUTO: 28.1 PG (ref 27–31)
MCHC RBC AUTO-ENTMCNC: 32 G/DL (ref 32–36)
MCV RBC AUTO: 87.9 FL (ref 80–99)
MONOCYTES # BLD AUTO: 0.38 10*3/MM3 (ref 0–1)
MONOCYTES NFR BLD AUTO: 7.1 % (ref 0–12)
NEUTROPHILS # BLD AUTO: 2.81 10*3/MM3 (ref 1.5–8.3)
NEUTROPHILS NFR BLD AUTO: 52.4 % (ref 41–71)
PLATELET # BLD AUTO: 362 10*3/MM3 (ref 150–450)
PMV BLD AUTO: 10.2 FL (ref 6–12)
POTASSIUM BLD-SCNC: 4.5 MMOL/L (ref 3.5–5.5)
PROT SERPL-MCNC: 7.7 G/DL (ref 5.7–8.2)
RBC # BLD AUTO: 3.98 10*6/MM3 (ref 3.89–5.14)
SODIUM BLD-SCNC: 138 MMOL/L (ref 132–146)
WBC NRBC COR # BLD: 5.36 10*3/MM3 (ref 3.5–10.8)

## 2017-08-18 PROCEDURE — 36415 COLL VENOUS BLD VENIPUNCTURE: CPT

## 2017-08-18 PROCEDURE — 99215 OFFICE O/P EST HI 40 MIN: CPT | Performed by: PSYCHIATRY & NEUROLOGY

## 2017-08-18 PROCEDURE — 85025 COMPLETE CBC W/AUTO DIFF WBC: CPT | Performed by: PSYCHIATRY & NEUROLOGY

## 2017-08-18 PROCEDURE — 86480 TB TEST CELL IMMUN MEASURE: CPT | Performed by: PSYCHIATRY & NEUROLOGY

## 2017-08-18 PROCEDURE — 80053 COMPREHEN METABOLIC PANEL: CPT | Performed by: PSYCHIATRY & NEUROLOGY

## 2017-08-18 RX ORDER — DALFAMPRIDINE 10 MG/1
10 TABLET, FILM COATED, EXTENDED RELEASE ORAL 2 TIMES DAILY
Qty: 60 TABLET | Refills: 11 | Status: SHIPPED | OUTPATIENT
Start: 2017-08-18 | End: 2018-08-07 | Stop reason: SDUPTHER

## 2017-08-18 RX ORDER — LEVETIRACETAM 500 MG/1
500 TABLET, EXTENDED RELEASE ORAL DAILY
Qty: 30 TABLET | Refills: 11 | Status: SHIPPED | OUTPATIENT
Start: 2017-08-18 | End: 2017-10-25 | Stop reason: SDUPTHER

## 2017-08-18 NOTE — PROGRESS NOTES
Subjective:     Patient ID: Yifan Bowman is a 42 y.o. female.    History of Present Illness     42 y.o. woman presents for evaluation of RRMS.  Previously dx and followed by Dr Franklin.  7 years ago fell off porch for unknown reasons.  Left hip is painful and weak.  Pain present when walking.  Left weakness has worsened.  Sx started two months after delivery of 4th child. Band like pain and pressure around lower abdomen.  Noticeable fatigue.  Heat increases sleepiness.  Bladder frequency.  Numbness medial left calf and fingertips.  Started on Aubagio without side effects.      Reviewed Dr Franklin's notes:    Presented on 17 for left LE pain, numbness and weakness for 6 years.  Also, notes intermittent hand numbness.  MRI results below.  Started on Prednisone and Aubagio 7 mg on 17  Labs - ACE, Lyme, LUCY, CMP, CBC, TSH, CRP reviewed    My review of films:  MRI Brain 2/15/17 3/30/17  Large left anterior CC black hole, 10 to 15 T2 lesions, multiple CC lesions. No evidence of enhancement    MRI C-spine 3/30/17 C4/5 T2 cord signal w/o enhancement    The following portions of the patient's history were reviewed and updated as appropriate:   She  has a past medical history of MS (multiple sclerosis) (3/23/2017).  She  has a past surgical history that includes Reduction mammaplasty and  section.  Her family history includes Cancer in her paternal grandfather.  She  reports that she has never smoked. She has never used smokeless tobacco. She reports that she does not drink alcohol or use illicit drugs.  Current Outpatient Prescriptions   Medication Sig Dispense Refill   • gabapentin (NEURONTIN) 300 MG capsule Take 1 capsule by mouth 3 (Three) Times a Day. 90 capsule 5   • Dalfampridine ER (AMPYRA) 10 MG tablet sustained-release 12 hour Take 10 mg by mouth 2 (Two) Times a Day. 60 tablet 11   • levETIRAcetam XR (KEPPRA XR) 500 MG 24 hr tablet Take 1 tablet by mouth Daily. 30 tablet 11   • Teriflunomide  (AUBAGIO) 14 MG tablet Take 14 mg by mouth Daily for 30 days. 30 tablet 11     No current facility-administered medications for this visit.      Current Outpatient Prescriptions on File Prior to Visit   Medication Sig   • gabapentin (NEURONTIN) 300 MG capsule Take 1 capsule by mouth 3 (Three) Times a Day.   • [DISCONTINUED] meloxicam (MOBIC) 15 MG tablet Take 1 tablet by mouth Daily.   • [DISCONTINUED] Teriflunomide 7 MG tablet Take 7 mg by mouth Daily.     No current facility-administered medications on file prior to visit.      She is allergic to benadryl [diphenhydramine]..    Review of Systems   Constitutional: Positive for fatigue. Negative for activity change and unexpected weight change.   HENT: Negative for tinnitus and trouble swallowing.    Eyes: Negative for photophobia and visual disturbance.   Respiratory: Negative for apnea, cough and choking.    Cardiovascular: Negative for leg swelling.   Gastrointestinal: Negative for nausea and vomiting.   Endocrine: Positive for heat intolerance. Negative for cold intolerance.   Genitourinary: Negative for difficulty urinating, frequency, menstrual problem and urgency.   Musculoskeletal: Positive for gait problem. Negative for back pain, myalgias and neck pain.   Skin: Negative for color change and rash.   Allergic/Immunologic: Negative for immunocompromised state.   Neurological: Positive for weakness. Negative for dizziness, tremors, seizures, syncope, facial asymmetry, speech difficulty, light-headedness, numbness and headaches.   Hematological: Negative for adenopathy. Does not bruise/bleed easily.   Psychiatric/Behavioral: Negative for behavioral problems, confusion, decreased concentration, hallucinations and sleep disturbance.        Objective:  Vitals:    08/18/17 0913   BP: 124/80   Pulse: 87   SpO2: 99%   ,  Neurologic Exam     Mental Status   Oriented to person, place, and time.   Registration: recalls 3 of 3 objects. Recall at 5 minutes: recalls 3 of 3  objects. Follows 3 step commands.   Attention: normal. Concentration: normal.   Speech: speech is normal   Level of consciousness: alert  Knowledge: good and consistent with education.   Able to name object. Able to read. Able to repeat. Able to write. Normal comprehension.     Cranial Nerves     CN II   Visual fields full to confrontation.   Visual acuity: normal  Right visual field deficit: none  Left visual field deficit: none     CN III, IV, VI   Pupils are equal, round, and reactive to light.  Extraocular motions are normal.   Right pupil: Shape: regular. Reactivity: brisk. Consensual response: intact.   Left pupil: Shape: regular. Reactivity: brisk. Consensual response: intact.   Nystagmus: none   Diplopia: none  Ophthalmoparesis: none  Upgaze: normal  Downgaze: normal  Conjugate gaze: present  Vestibulo-ocular reflex: present    CN V   Facial sensation intact.   Right corneal reflex: normal  Left corneal reflex: normal    CN VII   Right facial weakness: none  Left facial weakness: none    CN VIII   Hearing: intact    CN IX, X   Palate: symmetric  Right gag reflex: normal  Left gag reflex: normal    CN XI   Right sternocleidomastoid strength: normal  Left sternocleidomastoid strength: normal    CN XII   Tongue: not atrophic  Fasciculations: absent  Tongue deviation: none    Motor Exam   Muscle bulk: normal  Overall muscle tone: normal  Right arm tone: normal  Left arm tone: normal  Right leg tone: normal  Left leg tone: spastic    Strength   Strength 5/5 except as noted.   Left iliopsoas: 4/5  Left quadriceps: 4/5  Left hamstrin/5  Left glutei: 4/5  Left anterior tibial: 4/5  Left posterior tibial: 4/5  Left peroneal: 4/5  Left gastroc: 4/5    Sensory Exam   Light touch normal.   Vibration normal.   Proprioception normal.   Pinprick normal.        Decreased pp and LT medial left calf     Gait, Coordination, and Reflexes     Gait  Gait: spastic    Coordination   Romberg: negative  Finger to nose  coordination: normal  Heel to shin coordination: abnormal  Tandem walking coordination: abnormal    Tremor   Resting tremor: absent  Intention tremor: absent  Action tremor: absent    Reflexes   Reflexes 2+ except as noted.   Right brachioradialis: 2+  Left brachioradialis: 2+  Right biceps: 2+  Left biceps: 2+  Right triceps: 3+  Left triceps: 2+  Right patellar: 3+  Left patellar: 3+  Right achilles: 4+  Left achilles: 4+  Right ankle clonus: present  Left ankle clonus: present      Physical Exam   Constitutional: She is oriented to person, place, and time. Vital signs are normal. She appears well-developed and well-nourished.   HENT:   Head: Normocephalic and atraumatic.   Eyes: EOM and lids are normal. Pupils are equal, round, and reactive to light.   Fundoscopic exam:       The right eye shows no exudate, no hemorrhage and no papilledema. The right eye shows venous pulsations.        The left eye shows no exudate, no hemorrhage and no papilledema. The left eye shows venous pulsations.   Neck: Normal range of motion and phonation normal. Normal carotid pulses present. Carotid bruit is not present. No thyroid mass and no thyromegaly present.   Cardiovascular: Normal rate, regular rhythm and normal heart sounds.    Pulmonary/Chest: Effort normal.   Neurological: She is oriented to person, place, and time. She has an abnormal Heel to Shin Test and an abnormal Tandem Gait Test. She has a normal Finger-Nose-Finger Test and a normal Romberg Test.   Reflex Scores:       Tricep reflexes are 3+ on the right side and 2+ on the left side.       Bicep reflexes are 2+ on the right side and 2+ on the left side.       Brachioradialis reflexes are 2+ on the right side and 2+ on the left side.       Patellar reflexes are 3+ on the right side and 3+ on the left side.       Achilles reflexes are 4+ on the right side and 4+ on the left side.  Skin: Skin is warm and dry.   Psychiatric: She has a normal mood and affect. Her speech is  normal.   Nursing note and vitals reviewed.      Assessment/Plan:  Blanco Saha was seen today for multiple sclerosis.    Diagnoses and all orders for this visit:    MS (multiple sclerosis)  -     Discontinue: Teriflunomide 7 MG tablet; Take 14 mg by mouth Daily.  -     CBC & Differential; Future  -     Comprehensive Metabolic Panel; Future  -     QuantiFERON TB Client Incubated; Future  -     Ambulatory Referral to Physical Therapy Evaluate and treat  -     Ambulatory Referral to Occupational Therapy  -     Ambulatory Referral to Speech Therapy    Other orders  -     levETIRAcetam XR (KEPPRA XR) 500 MG 24 hr tablet; Take 1 tablet by mouth Daily.  -     Dalfampridine ER (AMPYRA) 10 MG tablet sustained-release 12 hour; Take 10 mg by mouth 2 (Two) Times a Day.  -     Teriflunomide (AUBAGIO) 14 MG tablet; Take 14 mg by mouth Daily for 30 days.       Significant gait disorder with spasticity on left leg.  Increase Aubagio 14 mg qday, add Keppra  mg qday, continue  mg TID.  Add Ampyra 10 mg BID.    Refer to PT/OT/SLP  Labs CBC, CMP, TB

## 2017-08-21 ENCOUNTER — TELEPHONE (OUTPATIENT)
Dept: NEUROLOGY | Facility: CLINIC | Age: 42
End: 2017-08-21

## 2017-08-21 NOTE — TELEPHONE ENCOUNTER
Pt called and adv that she wanted to see if we could call in a week supply of Ampyra 10 mg, Keppra  mg , and Aubagio 14 mg to [facility] due to scripts going to other pharmacy and she needs to start medicine.

## 2017-08-22 LAB
INTERPRETATION: NORMAL
M TB TUBERC IFN-G BLD QL: NEGATIVE
QFT TB AG MINUS NIL VALUE: 0.01 IU/ML
QUANTIFERON CRITERIA: NORMAL
QUANTIFERON MITOGEN VALUE: 8.34 IU/ML
QUANTIFERON NIL VALUE: 0.04 IU/ML
QUANTIFERON TB AG VALUE: 0.05 IU/ML

## 2017-08-23 ENCOUNTER — HOSPITAL ENCOUNTER (OUTPATIENT)
Dept: OCCUPATIONAL THERAPY | Facility: HOSPITAL | Age: 42
Setting detail: THERAPIES SERIES
Discharge: HOME | End: 2017-08-23

## 2017-08-23 ENCOUNTER — HOSPITAL ENCOUNTER (OUTPATIENT)
Dept: PHYSICAL THERAPY | Facility: HOSPITAL | Age: 42
Setting detail: THERAPIES SERIES
Discharge: HOME | End: 2017-08-23

## 2017-08-23 DIAGNOSIS — R26.89 BALANCE PROBLEM: Primary | ICD-10-CM

## 2017-08-23 DIAGNOSIS — R26.9 GAIT ABNORMALITY: ICD-10-CM

## 2017-08-23 DIAGNOSIS — M25.552 LEFT HIP PAIN: ICD-10-CM

## 2017-08-23 DIAGNOSIS — Z78.9 DECREASED INDEPENDENCE WITH ACTIVITIES OF DAILY LIVING: Primary | ICD-10-CM

## 2017-08-23 DIAGNOSIS — Z74.09 DECREASED STRENGTH, ENDURANCE, AND MOBILITY: ICD-10-CM

## 2017-08-23 DIAGNOSIS — R53.1 DECREASED STRENGTH, ENDURANCE, AND MOBILITY: ICD-10-CM

## 2017-08-23 DIAGNOSIS — R27.8 DECREASED COORDINATION: ICD-10-CM

## 2017-08-23 DIAGNOSIS — R68.89 DECREASED STRENGTH, ENDURANCE, AND MOBILITY: ICD-10-CM

## 2017-08-23 PROCEDURE — 97110 THERAPEUTIC EXERCISES: CPT | Performed by: PHYSICAL THERAPIST

## 2017-08-23 PROCEDURE — 97166 OT EVAL MOD COMPLEX 45 MIN: CPT | Performed by: OCCUPATIONAL THERAPIST

## 2017-08-23 PROCEDURE — 97162 PT EVAL MOD COMPLEX 30 MIN: CPT | Performed by: PHYSICAL THERAPIST

## 2017-08-23 NOTE — THERAPY EVALUATION
Outpatient Physical Therapy Neuro Initial Evaluation  [facility]     Patient Name: Yifan Bowman  : 1975  MRN: [id number]  Today's Date: 2017      Visit Date: 2017    Patient Active Problem List   Diagnosis   • MS (multiple sclerosis)   • Left hip pain        Past Medical History:   Diagnosis Date   • MS (multiple sclerosis) 3/23/2017        Past Surgical History:   Procedure Laterality Date   •  SECTION     • REDUCTION MAMMAPLASTY           Visit Dx:     ICD-10-CM ICD-9-CM   1. Balance problem R26.89 781.99   2. Gait abnormality R26.9 781.2   3. Left hip pain M25.552 719.45             Patient History       17 1300 17 1000       History    Chief Complaint Balance Problems;Difficulty Walking;Difficulty with daily activities;Falls/history of falls;Muscle weakness;Pain;Numbness;Fatigue/poor endurance;Impaired sensation   impaired coordination  -MW Balance Problems;Difficulty Walking;Difficulty with daily activities;Falls/history of falls;Muscle weakness;Pain;Numbness;Fatigue/poor endurance;Impaired sensation   impaired coordination  -EM     Type of Pain Hip pain   Left  -MW Hip pain   Left  -EM     Date Current Problem(s) Began 17  -MW 17  -EM     Brief Description of Current Complaint Mrs. Bowman is a 42 year old. She was dx with MS in March and presents to clinic with c/o weakness, stiffiness, pain, and a history of falls. Pt had therapy at [facility] earlier in the year.  -MW Mrs. Bowman is a 42 year old. She was dx with MS in March and presents to clinic with c/o weakness and pain and history of falls. She reports numbness in the tips of her fingers. She recently has had changes in job responsibilities that require her to walk and carry items.  This is difficult for her at this time.  She has 8 children at home that she cares for and reports doing all the laundry and cooking; however with difficulty.   -EM     Onset Date- PT 2017  -MW      Onset Date- OT   08/23/2017  -EM     Patient/Caregiver Goals Improve mobility;Improve strength;Other (comment);Relieve pain  -MW Improve mobility;Improve strength;Other (comment)   improve energy for tasks  -EM     Current Tobacco Use No  -MW No  -EM     Smoking Status No  -MW No  -EM     Hand Dominance right-handed  -MW right-handed  -EM     Occupation/sports/leisure activities  at [employer], enjoys spending time with family  -MW  at [employer], enjoys spending time with family  -EM     What clinical tests have you had for this problem? MRI  -MW      Results of Clinical Tests lesions in brain and s.c.  -MW      Pain     Pain Location Hip   left  -MW Hip   LEft  -EM     Pain at Present 0  -MW 0  -EM     Pain at Best 0  -MW 0  -EM     Pain at Worst 6  -MW 6  -EM     Pain Frequency Intermittent  -MW Intermittent  -EM     What Performance Factors Make the Current Problem(s) WORSE? walking and standing  -MW walking and standing  -EM     What Performance Factors Make the Current Problem(s) BETTER? resting and sitting  -MW resting and sitting  -EM     Fall Risk Assessment    Any falls in the past year: Yes  -MW Yes  -EM     Number of falls reported in the last 12 months 2  -MW 2  -EM     Factors that contributed to the fall: Tripped;Fatigue  -MW Tripped;Fatigue  -EM     Daily Activities    Primary Language English  -MW English  -EM     Are you able to read Yes  -MW Yes  -EM     Are you able to write Yes  -MW Yes  -EM     Teaching needs identified Home Exercise Program;Falls Prevention;Management of Condition  -MW Home Exercise Program;Falls Prevention;Home Safety;Management of Condition  -EM     Pt Participated in POC and Goals Yes  -MW Yes  -EM     Safety    Are you being hurt, hit, or frightened by anyone at home or in your life? No  -MW No  -EM     Are you being neglected by a caregiver No  -MW No  -EM       User Key  (r) = Recorded By, (t) = Taken By, (c) = Cosigned By     "Initials Name Provider Type    REID Adriana M , OTR Occupational Therapist     Meghan Lopez, PT Physical Therapist             Hand Therapy (last 24 hours)      Hand Eval       08/23/17 1000          Hand  Strength     Strength Affected Side Bilateral  -EM       Strength Right    # Reps 3  -EM      Right Rung 2  -EM      Right  Test 1 40  -EM      Right  Test 2 34  -EM      Right  Test 3 36  -EM       Strength Average Right 36.67  -EM       Strength Left    # Reps 3  -EM      Left Rung 2  -EM      Left  Test 1 30  -EM      Left  Test 2 41  -EM      Left  Test 3 32  -EM       Strength Average Left 34.33  -EM      Pinch Strength    Number of Reps 3  -EM      Affected Side Bilateral  -EM      Right Hand Strength - Pinch (lbs)    Lateral 11 lbs  -EM      Left Hand Strength - Pinch (lbs)    Lateral 10 lbs  -EM        User Key  (r) = Recorded By, (t) = Taken By, (c) = Cosigned By    Initials Name Provider Type    EM Adriana BRADLEY , OTR Occupational Therapist                PT Neuro       08/23/17 1300          Home Living    Living Arrangements condominium  -MW      Home Accessibility stairs within home  -MW      Number of Stairs Within Home 16  -MW      Stair Railings at Home --   1HR  -MW      Home Equipment --   none  -MW      Living Environment Comment Pt lives with 8 children from the ages of 18 to 10y/o.  -MW      Vision-Basic Assessment    Current Vision Does not wear glasses  -MW      Sensation    Light Touch No apparent deficits   pt reports that the L LE sensation feels \"lighter\" then R  -MW      Proprioception    Proprioception WNL  -MW      Posture/Observations    Alignment Options --   decreased L LE WB in standing and with activities  -MW      Coordination    Coordination Tests Rapid Alternating  -MW      Rapid Alternating Left:;Impaired  -MW      ROM (Range of Motion)    General ROM lower extremity range of motion deficits identified  -MW      General ROM " Detail L ankle: 4 degrees from 0; R ankle 2 degrees of DF  -MW      MMT (Manual Muscle Testing)    General MMT Assessment lower extremity strength deficits identified  -MW      Left Hip    Hip Flexion Gross Movement (3-/5) fair minus  -MW      Hip Extension Gross Movement (3-/5) fair minus  -MW      Hip ABduction Gross Movement (3-/5) fair minus  -MW      Hip ADduction Gross Movement (3-/5) fair minus  -MW      Right Hip    Hip Flexion Gross Movement (3+/5) fair plus;(4-/5) good minus  -MW      Hip Extension Gross Movement (3+/5) fair plus  -MW      Hip ABduction Gross Movement (4-/5) good minus  -MW      Hip ADduction Gross Movement (4-/5) good minus  -MW      Left Knee    Knee Extension Gross Movement (4-/5) good minus  -MW      Knee Flexion Gross Movement (4-/5) good minus  -MW      Right Knee    Knee Extension Gross Movement (4/5) good  -MW      Knee Flexion Gross Movement (4/5) good  -MW      Left Ankle/Foot    Ankle PF Gross Movement (2-/5) poor minus  -MW      Ankle Dorsiflexion Gross Movement (2-/5) poor minus  -MW      Right Ankle/Foot    Ankle PF Gross Movement (4-/5) good minus  -MW      Ankle Dorsiflexion Gross Movement (4-/5) good minus  -MW      Tone    LE Tone left:;Hypertonic   quad, ham, ABduction hip: 2+/5 with PROM  -MW      Tone Comments L ankle up to 10 clonus beats  -MW      Bed Mobility, Assessment/Treatment    Bed Mobility, Comment independent  -MW      Transfers    Transfers, Bed-Chair [place] independent  -MW      Transfers, Chair-Bed [place] independent  -MW      Transfers, Sit-Stand [place] independent  -MW      Transfers, Stand-Sit [place] independent  -MW      Transfer, Comment sit to stand with decreased L LE WB  -MW      Gait Assessment/Treatment    Gait, [place] Level supervision required;contact guard assist  -MW      Gait, Distance (Feet) 150  -MW      Gait, Gait Deviations jena decreased;step length decreased   L circumduction, L ankle PF, increased L  ext tone  -MW      Gait, Safety Issues balance decreased during turns;step length decreased  -MW      Gait, Impairments muscle tone abnormal;ROM decreased;sensation decreased;strength decreased;impaired balance;coordination impaired;pain  -MW      Stairs Assessment/Treatment    Number of Stairs 12  -MW      Stairs, Handrail Location left side (ascending)   pt uses one hand or both hands on single HR  -MW      Stairs, [place] Level contact guard assist  -MW      Stairs, Technique Used step over step (ascending);step over step (descending)  -MW      Stairs, Safety Issues balance decreased during turns  -MW      Stairs, Impairments muscle tone abnormal;ROM decreased;sensation decreased;strength decreased;impaired balance;coordination impaired;pain  -MW        User Key  (r) = Recorded By, (t) = Taken By, (c) = Cosigned By    Initials Name Provider Type    HANANE Lopez, PT Physical Therapist                        Therapy Education       08/23/17 1328 08/23/17 1158       Therapy Education    Education Details use of Bioness L300 for L foot drop, ampyra perscribed by MD, exercise with combo of taking MS meds benefit  -MW      Given HEP  -MW Other (comment)   role of OT and POC, nutrition handout  -EM     Program New  -MW New  -EM     How Provided Verbal;Demonstration;Written  -MW Verbal;Written  -EM     Provided to Patient  -MW Patient  -EM     Level of Understanding Verbalized;Demonstrated  -MW Verbalized  -EM       User Key  (r) = Recorded By, (t) = Taken By, (c) = Cosigned By    Initials Name Provider Type    EM Adriana Fuller, OTR Occupational Therapist    HANANE Lopez, PT Physical Therapist                PT OP Goals       08/23/17 1300       PT Short Term Goals    STG Date to Achieve 10/04/17  -MW     STG 1 Patient to improve STEINER balance score to >/= 49/56 to decrease client's risk of falls.  -MW     STG 1 Progress New  -MW     STG 2 Patient to perform TUG within 12 sec without LOB for improved  functional mobility.  -MW     STG 2 Progress New  -MW     STG 3 Patient to improve FGA score to >/= 15/30 to decrease client's risk of falls.  -MW     STG 3 Progress New  -MW     STG 4 Pt. to improve DGI score to >/=  /24 to decrease pts risk of falls.  -MW     STG 4 Progress New  -MW     Long Term Goals    LTG Date to Achieve 11/15/17  -MW     LTG 1 Patient to improve STEINER balance score to >/= 54/56 to decrease client's risk of falls.  -MW     LTG 1 Progress New  -MW     LTG 2 Patient to perform TUG within 10.5 sec without LOB for improved functional mobility.  -MW     LTG 2 Progress New  -MW     LTG 3 Patient to improve FGA score to >/= 22/30 to decrease client's risk of falls.  -MW     LTG 3 Progress New  -MW     LTG 4 Pt. to improve DGI score to >/=  21/24 to decrease pts risk of falls.  -MW     LTG 4 Progress New  -MW     LTG 5 Patient to ambulate 25 feet without AD within 8.5 sec without LOB at a regular pace for improved gait jena  -MW     LTG 5 Progress New  -MW     LTG 6 Patient to be I with HEP  -MW     LTG 6 Progress New  -MW     Time Calculation    PT Goal Re-Cert Due Date 09/20/17  -MW       User Key  (r) = Recorded By, (t) = Taken By, (c) = Cosigned By    Initials Name Provider Type    HANANE Lopez, PT Physical Therapist                PT Assessment/Plan       08/23/17 1300 08/23/17 1000    PT Assessment    Functional Limitations Decreased safety during functional activities;Impaired gait;Impaired locomotion;Limitation in home management;Limitations in community activities;Performance in leisure activities  -MW     Impairments Balance;Coordination;Endurance;Gait;Impaired flexibility;Locomotion;Muscle strength;Pain;Sensation;Range of motion   increased tone L LE  -MW     Assessment Comments Pt. demonstrates an evolving clinical presentation with impaired gait, L foot drop, increased tone L LE, generalized weakness, L hip pain and impaired functional mobility with a history of falls.  Pt. to  benefit from PT services to address the listed impairements with trial of Bioness L300 for L LE and FES.  -MW     Please refer to paper survey for additional self-reported information Yes  -MW     Rehab Potential Good  -MW     Patient/caregiver participated in establishment of treatment plan and goals Yes  -MW     Patient would benefit from skilled therapy intervention Yes  -MW     PT Plan    PT Frequency 1x/week  -MW     Predicted Duration of Therapy Intervention (days/wks) 12 weeks  -MW 8 weeks  -EM    Planned CPT's? PT EVAL MOD COMPLELITY: 91682;PT THER PROC EA 15 MIN: 36512;PT NEUROMUSC RE-EDUCATION EA 15 MIN: 46785;PT GAIT TRAINING EA 15 MIN: 95264;PT ELECTRICAL STIM UNATTEND: ;PT ELECTRICAL STIM ATTD EA 15 MIN: 13462  -MW     PT Plan Comments Pt. to benefit from skilled PT services to improve gait, balance, strength, pain and improve functional mobility.  Pt. to benefit from trial of Bioness L300 for L foot and possibly use of Dynasplint secondary to L foot drop.  -MW       User Key  (r) = Recorded By, (t) = Taken By, (c) = Cosigned By    Initials Name Provider Type    EM Adriana Fuller, OTR Occupational Therapist    HANANE Lopez, PT Physical Therapist                   Exercises       08/23/17 1300          Exercise 1    Exercise Name 1 Issued and performed HEP, see for details.  -MW        User Key  (r) = Recorded By, (t) = Taken By, (c) = Cosigned By    Initials Name Provider Type    HANANE Lopez, PT Physical Therapist                            Outcome Measures       08/23/17 1300 08/23/17 1000       25 Foot Walk    Walk #1 10.87 seconds  -MW      Was an Assistive Device Used? No  -MW      9 Hole Peg    9-Hole Peg Left  26.18  -EM     9-Hole Peg Right  26.09  -EM     Loyd Balance Scale    Sitting to Standing 4  -MW      Standing Unsupported 4  -MW      Sitting with Back Unsupported but Feet Supported on Floor or on Stool 4  -MW      Standing to Sitting 4  -MW      Transfers 4  -MW       Standing Unsupported with Eyes Closed 3  -MW      Standing Unsupported with Feet Together 2  -MW      Reaching Forward with Outstretched Arm While Standing 4  -MW       Object From the Floor From a Standing Position 4  -MW      Turning to Look Behind Over Left and Right Shoulders While Standing 4  -MW      Turn 360 Degrees 2  -MW      Place Alternate Foot on Step or Stool While Standing Unsupported 2  -MW      Standing Unsupported with One Foot in Front 2  -MW      Standing on One Leg 1  -MW      Loyd Total Score 44  -MW      Dynamic Gait Index (DGI)    Gait Level Surface 1  -MW      Change in Gait Speed 1  -MW      Gait with Horizontal Head Turns 0  -MW      Gait with Vertical Head Turns 0  -MW      Gait and Pivot Turn 1  -MW      Step Over Obstacle 1  -MW      Step Around Obstacles 1  -MW      Steps 2  -MW      Dynamic Gait Index Score 7  -MW      Functional Gait Assessment (FGA)    Gait Level Surface 1  -MW      Change in Gait Speed 1  -MW      Gait with Horizontal Head Turns 0  -MW      Gait with Vertical Head Turns 0  -MW      Gait and Pivot Turn 1  -MW      Step Over Obstacle 1  -MW      Gait with Narrow Base of Support 0  -MW      Gait with Eyes Closed 1  -MW      Ambulating Backwards 1  -MW      Steps 2  -MW      FGA Total Score 8  -MW      Purdue Pegboard    Left Hand (30 seconds)  11  -EM     Right Hand (30 seconds)  10  -EM     Both Hands (30 seconds)  8  -EM     Right + Left + Both Hands  29  -EM     Assembly (60 seconds)  24  -EM     Timed Up and Go (TUG)    TUG Test 1 14.06 seconds  -MW      Other Outcome Measure Tool Used    Other Outcome Measure Tool Comments  MFIS - 44/84; Modified Falls Efficacy Scale = avg 3.08  -EM     Functional Assessment    Outcome Measure Options 25 Foot Walk;Loyd Balance;Dynamic Gait Index;FGA (Functional Gait Assessment);Timed Up and Go (TUG)  -MW 9 Hole Peg;[place] Peg Board;Other Outcome Measure   Modified Fatigue Impact Scale  -EM       User Key  (r) = Recorded By,  (t) = Taken By, (c) = Cosigned By    Initials Name Provider Type    EM Adriana Fuller, OTR Occupational Therapist    MW Meghan Lopez, PT Physical Therapist          Time Calculation:   Start Time: 0900  Total Timed Code Minutes- PT: 15 minute(s)     Therapy Charges for Today     Code Description Service Date Service Provider Modifiers Qty    93635713896 HC PT EVAL MOD COMPLEXITY 4 8/23/2017 Meghan Lopez, PT GP 1    26248101649 HC PT THER PROC EA 15 MIN 8/23/2017 Meghan Lopez, PT GP 1          PT G-Codes  Outcome Measure Options: 25 Foot Walk, Loyd Balance, Dynamic Gait Index, FGA (Functional Gait Assessment), Timed Up and Go (TUG)         Meghan Lopez, PT  8/23/2017

## 2017-08-23 NOTE — THERAPY EVALUATION
Outpatient Occupational Therapy Neuro Initial Evaluation  [facility]     Patient Name: Yifan Bowman  : 1975  MRN: [id number]  Today's Date: 2017      Visit Date: 2017    Patient Active Problem List   Diagnosis   • MS (multiple sclerosis)   • Left hip pain        Past Medical History:   Diagnosis Date   • MS (multiple sclerosis) 3/23/2017        Past Surgical History:   Procedure Laterality Date   •  SECTION     • REDUCTION MAMMAPLASTY           Visit Dx:      ICD-10-CM ICD-9-CM   1. Decreased independence with activities of daily living Z65.8 V62.89   2. Decreased strength, endurance, and mobility Z74.09 780.99   3. Decreased coordination R27.9 781.3             Patient History       17 1000          History    Chief Complaint Balance Problems;Difficulty Walking;Difficulty with daily activities;Falls/history of falls;Muscle weakness;Pain;Numbness;Fatigue/poor endurance;Impaired sensation   impaired coordination  -EM      Type of Pain Hip pain   Left  -EM      Date Current Problem(s) Began 17  -EM      Brief Description of Current Complaint Mrs. Bowman is a 42 year old. She was dx with MS in March and presents to clinic with c/o weakness and pain and history of falls. She reports numbness in the tips of her fingers. She recently has had changes in job responsibilities that require her to walk and carry items.  This is difficult for her at this time.  She has 8 children at home that she cares for and reports doing all the laundry and cooking; however with difficulty.   -EM      Onset Date- OT 2017  -EM      Patient/Caregiver Goals Improve mobility;Improve strength;Other (comment)   improve energy for tasks  -EM      Current Tobacco Use No  -EM      Smoking Status No  -EM      Hand Dominance right-handed  -EM      Occupation/sports/leisure activities  at [employer], enjoys spending time with family  -EM      Pain     Pain Location Hip   LEft   -EM      Pain at Present 0  -EM      Pain at Best 0  -EM      Pain at Worst 6  -EM      Pain Frequency Intermittent  -EM      What Performance Factors Make the Current Problem(s) WORSE? walking and standing  -EM      What Performance Factors Make the Current Problem(s) BETTER? resting and sitting  -EM      Fall Risk Assessment    Any falls in the past year: Yes  -EM      Number of falls reported in the last 12 months 2  -EM      Factors that contributed to the fall: Tripped;Fatigue  -EM      Daily Activities    Primary Language English  -EM      Are you able to read Yes  -EM      Are you able to write Yes  -EM      Teaching needs identified Home Exercise Program;Falls Prevention;Home Safety;Management of Condition  -EM      Pt Participated in POC and Goals Yes  -EM      Safety    Are you being hurt, hit, or frightened by anyone at home or in your life? No  -EM      Are you being neglected by a caregiver No  -EM        User Key  (r) = Recorded By, (t) = Taken By, (c) = Cosigned By    Initials Name Provider Type    EM Adriana Fuller OTR Occupational Therapist                OT Neuro       08/23/17 1000          Subjective Comments    Subjective Comments Pt states she wants to be able to more around better and play with the kids  -EM      Subjective Pain    Able to rate subjective pain? yes  -EM      Pre-Treatment Pain Level 0  -EM      Post-Treatment Pain Level 0  -EM      Home Living    Living Arrangements condominium  -EM      Home Accessibility stairs within home;tub/shower is not walk in;grab bars present (bathtub)  -EM      Stair Railings at Home other (see comments)   inside (side unknown)  -EM      Living Environment Comment Pt lives with 8 children from 17 y/o to 8 y/o.  She loves to take hot baths and uses grab bar and edge of tub to get out of bath, but has had times when she cannot get out of tub.   -EM      Vision- Basic    Current Vision No visual deficits   denies vision changes  -EM      Sensation     Additional Comments Pt reports numbness in the tips of her fingers.   -EM      ROM (Range of Motion)    General ROM no range of motion deficits identified  -EM      General ROM Detail See PT notes for details on LEs  -EM      MMT (Manual Muscle Testing)    General MMT Assessment upper extremity strength deficits identified  -EM      Left Shoulder    Flexion Gross Movement (4/5) good  -EM      ABduction Gross Movement (4/5) good  -EM      Right Shoulder    Flexion Gross Movement (4/5) good  -EM      ABduction Gross Movement (4/5) good  -EM      Upper Extremity    Upper Ext Manual Muscle Testing left shoulder strength deficit;right shoulder strength deficit;left elbow/forearm strength deficit;right elbow/forearm strength deficit;left wrist strength deficit;right wrist strength deficit  -EM      Left Elbow/Forearm    Elbow Flexion Gross Movement (4+/5) good plus  -EM      Elbow Extension Gross Movement (4+/5) good plus  -EM      Right Elbow/Forearm    Elbow Flexion Gross Movement (4+/5) good plus  -EM      Elbow Extension Gross Movement (4+/5) good plus  -EM      Left Wrist    Wrist Flexion Gross Movement (4+/5) good plus  -EM      Wrist Extension Gross Movement (4+/5) good plus  -EM      Right Wrist    Wrist Flexion Gross Movement (4+/5) good plus  -EM      Wrist Extension Gross Movement (4+/5) good plus  -EM      ADL Assessment/Intervention    IADL Assess/Train, Comment Pt states she does all the cooking and cleaning with difficulty and fatigues easily. She uses her older sons to assist when walking long distances and has used a wheelchair in the past when [place] have [place].   -EM      Additional Documentation IADL Assess/Train, Comment (Row)  -EM        User Key  (r) = Recorded By, (t) = Taken By, (c) = Cosigned By    Initials Name Provider Type    EM EZRA York Occupational Therapist             Hand Therapy (last 24 hours)      Hand Eval       08/23/17 1000          Hand   Strength     Strength Affected Side Bilateral  -EM       Strength Right    # Reps 3  -EM      Right Rung 2  -EM      Right  Test 1 40  -EM      Right  Test 2 34  -EM      Right  Test 3 36  -EM       Strength Average Right 36.67  -EM       Strength Left    # Reps 3  -EM      Left Rung 2  -EM      Left  Test 1 30  -EM      Left  Test 2 41  -EM      Left  Test 3 32  -EM       Strength Average Left 34.33  -EM      Pinch Strength    Number of Reps 3  -EM      Affected Side Bilateral  -EM      Right Hand Strength - Pinch (lbs)    Lateral 11 lbs  -EM      Left Hand Strength - Pinch (lbs)    Lateral 10 lbs  -EM        User Key  (r) = Recorded By, (t) = Taken By, (c) = Cosigned By    Initials Name Provider Type    EM EZRA York Occupational Therapist                    Therapy Education       08/23/17 1158          Therapy Education    Given Other (comment)   role of OT and POC, nutrition handout  -EM      Program New  -EM      How Provided Verbal;Written  -EM      Provided to Patient  -EM      Level of Understanding Verbalized  -EM        User Key  (r) = Recorded By, (t) = Taken By, (c) = Cosigned By    Initials Name Provider Type    EM EZRA York Occupational Therapist                OT Goals       08/23/17 1000       OT Short Term Goals    STG Date to Achieve 09/20/17  -EM     STG 1 Pt will be educated in UE HEP to increase overall strength and functional use of B UEs for ADL tasks  -EM     STG 1 Progress New  -EM     STG 2 Pt will be educated in HEP to increase coordination and sensation in B hands to increase functional use  -EM     STG 2 Progress New  -EM     STG 3 Pt will participate in dynamic standing activities with min A while incorporating B hands to increase functional balance and mobility  -EM     STG 3 Progress New  -EM     Long Term Goals    LTG Date to Achieve 10/18/17  -EM     LTG 1 Pt will be independent with all HEPs  -EM     LTG 1 Progress New   -EM     LTG 2 Patient will participate in dynamic standing activities with SBA while incorporating B UEs to increase functional balance and mobility.   -EM     LTG 2 Progress New  -EM     LTG 3 Pt will score < 23 secs bilaterally on 9 HPT to indicate increased coordination for ADL takss  -EM     LTG 3 Progress New  -EM     Time Calculation    OT Goal Re-Cert Due Date 09/20/17  -EM       User Key  (r) = Recorded By, (t) = Taken By, (c) = Cosigned By    Initials Name Provider Type    EM Adriana Fuller OTR Occupational Therapist                OT Assessment/Plan       08/23/17 1000       OT Assessment    Functional Limitations Decreased safety during functional activities;Limitation in home management;Limitations in community activities;Limitations in functional capacity and performance;Performance in leisure activities;Performance in self-care ADL;Performance in work activities  -EM     Impairments Balance;Coordination;Dexterity;Endurance;Muscle strength;Motor function;Sensation;Pain  -EM     Assessment Comments Decreased independence, safety and satisfaction with ADL tasks and desired occupations d/t above listed impairments.  Pt has PMH of hip pain, falls, and MS and currently presents with moderate symptoms interferring with her ADL tasks.   -EM     Please refer to paper survey for additional self-reported information Yes  -EM     OT Rehab Potential Good  -EM     Patient/caregiver participated in establishment of treatment plan and goals Yes  -EM     Patient would benefit from skilled therapy intervention Yes  -EM     OT Plan    OT Frequency 1x/week  -EM     Predicted Duration of Therapy Intervention (days/wks) 8 weeks  -EM     Planned CPT's? OT EVAL MOD COMPLEXITY: 55246;OT NEUROMUSC RE EDUCATION EA 15 MIN: 77324;OT THER PROC EA 15 MIN: 15146QW;OT SELF CARE/MGMT/TRAIN 15 MIN: 52144;OT THER ACT EA 15 MIN: 61482AD  -EM     Planned Therapy Interventions (Optional Details) balance training;home exercise program;motor  coordination training;neuromuscular re-education;patient/family education;strengthening;stretching;other (see comments)   ADL retraining; AE/Adaptive technique  -EM     OT Plan Comments Recommend skilled OT services to address established goals as specified.   -EM       User Key  (r) = Recorded By, (t) = Taken By, (c) = Cosigned By    Initials Name Provider Type    EM Adriana BRADLEY , OTR Occupational Therapist                OT Exercises       08/23/17 1000          Exercise 1    Exercise Name 1 Pt was given information regarding nutrition and encouraged to talk to nurse about setting up an appointment with nutritionist as desired.   -EM        User Key  (r) = Recorded By, (t) = Taken By, (c) = Cosigned By    Initials Name Provider Type    EM Adriana EZRA Banerjee Occupational Therapist                    Outcome Measures       08/23/17 1000          9 Hole Peg    9-Hole Peg Left 26.18  -EM      9-Hole Peg Right 26.09  -EM      Purdue Pegboard    Left Hand (30 seconds) 11  -EM      Right Hand (30 seconds) 10  -EM      Both Hands (30 seconds) 8  -EM      Right + Left + Both Hands 29  -EM      Assembly (60 seconds) 24  -EM      Other Outcome Measure Tool Used    Other Outcome Measure Tool Comments MFIS - 44/84; Modified Falls Efficacy Scale = avg 3.08  -EM      Functional Assessment    Outcome Measure Options 9 Hole Peg;Purdue Peg Board;Other Outcome Measure   Modified Fatigue Impact Scale  -EM        User Key  (r) = Recorded By, (t) = Taken By, (c) = Cosigned By    Initials Name Provider Type    REID Fuller OTR Occupational Therapist            Time Calculation:   OT Start Time: 1000  Total Timed Code Minutes- OT: 10 minute(s)     Therapy Charges for Today     Code Description Service Date Service Provider Modifiers Qty    10128086601  OT EVAL MOD COMPLEXITY 4 8/23/2017 Adriana Fuller, OTR GO 1                     Adriana Fuller OTR  8/23/2017

## 2017-09-01 ENCOUNTER — HOSPITAL ENCOUNTER (OUTPATIENT)
Dept: PHYSICAL THERAPY | Facility: HOSPITAL | Age: 42
Setting detail: THERAPIES SERIES
Discharge: HOME | End: 2017-09-01

## 2017-09-01 ENCOUNTER — HOSPITAL ENCOUNTER (OUTPATIENT)
Dept: OCCUPATIONAL THERAPY | Facility: HOSPITAL | Age: 42
Setting detail: THERAPIES SERIES
Discharge: HOME | End: 2017-09-01

## 2017-09-01 ENCOUNTER — APPOINTMENT (OUTPATIENT)
Dept: OCCUPATIONAL THERAPY | Facility: HOSPITAL | Age: 42
End: 2017-09-01

## 2017-09-01 DIAGNOSIS — R68.89 DECREASED STRENGTH, ENDURANCE, AND MOBILITY: ICD-10-CM

## 2017-09-01 DIAGNOSIS — Z78.9 DECREASED INDEPENDENCE WITH ACTIVITIES OF DAILY LIVING: Primary | ICD-10-CM

## 2017-09-01 DIAGNOSIS — Z74.09 DECREASED STRENGTH, ENDURANCE, AND MOBILITY: ICD-10-CM

## 2017-09-01 DIAGNOSIS — R27.8 DECREASED COORDINATION: ICD-10-CM

## 2017-09-01 DIAGNOSIS — R26.89 BALANCE PROBLEM: Primary | ICD-10-CM

## 2017-09-01 DIAGNOSIS — R26.9 GAIT ABNORMALITY: ICD-10-CM

## 2017-09-01 DIAGNOSIS — R53.1 DECREASED STRENGTH, ENDURANCE, AND MOBILITY: ICD-10-CM

## 2017-09-01 PROCEDURE — 97112 NEUROMUSCULAR REEDUCATION: CPT | Performed by: PHYSICAL THERAPIST

## 2017-09-01 PROCEDURE — 97032 APPL MODALITY 1+ESTIM EA 15: CPT | Performed by: PHYSICAL THERAPIST

## 2017-09-01 PROCEDURE — 97112 NEUROMUSCULAR REEDUCATION: CPT | Performed by: OCCUPATIONAL THERAPIST

## 2017-09-01 PROCEDURE — 97110 THERAPEUTIC EXERCISES: CPT | Performed by: PHYSICAL THERAPIST

## 2017-09-01 PROCEDURE — 97110 THERAPEUTIC EXERCISES: CPT | Performed by: OCCUPATIONAL THERAPIST

## 2017-09-01 NOTE — THERAPY TREATMENT NOTE
"    Outpatient Physical Therapy Neuro Treatment Note  [facility]     Patient Name: Yifan Bowman  : 1975  MRN: [id number]  Today's Date: 2017      Visit Date: 2017    Visit Dx:    ICD-10-CM ICD-9-CM   1. Balance problem R26.89 781.99   2. Gait abnormality R26.9 781.2       Patient Active Problem List   Diagnosis   • MS (multiple sclerosis)   • Left hip pain                 PT Neuro       17 1300          Subjective Pain    Able to rate subjective pain? yes  -MW      Pre-Treatment Pain Level 0  -MW      Post-Treatment Pain Level 0  -MW      Pain Assessment    Pain Assessment --  -MW      Balance Skills Training    Training Strategies (Balance Skills) Standing balance with Bioness L300  on L foot with B fwd stepping up onto/off and straddle step up 8\" step x 10 with min A.  Standing on blue foam with reg DANELLE with EO and EC with CGA/min A.  Standing on blue foam with one foot on step and hold with EC up to 10 sec x 3 with mod A and then perform EO with B cerivcal rot/flex/ext x 10 with min/mod A for balance.  -        User Key  (r) = Recorded By, (t) = Taken By, (c) = Cosigned By    Initials Name Provider Type    HANANE Lopez, PT Physical Therapist                        PT Assessment/Plan       17 1300       PT Assessment    Assessment Comments Pt. to benefit from PT services to improve B LE strength, balance, and gait.  Pt. demonstrates improved gait with Bioness L300 with active L ankle DF with swing.  Without assist pt demonstrates L circumduction and foot drop.  Pt. to benefit from L AFO for gait safety at this time.    -     PT Plan    PT Plan Comments Continue with PT services to improve gait, balance, strength, transfers and functional mobility.  Asking MD for order for L AFO for foot drop.  -       User Key  (r) = Recorded By, (t) = Taken By, (c) = Cosigned By    Initials Name Provider Type    HANANE Lopez, HARDY Physical Therapist                 Modalities     " "  09/01/17 1300          ELECTRICAL STIMULATION    Attended/Unattended Attended   seated in chair with active DF with contraction  -MW      Stimulation Type --   Russian  -MW      Max mAmp 38  -MW      Location/Electrode Placement/Other Bioness L300 L LE  -MW      Rx Minutes 10 mins  -MW        User Key  (r) = Recorded By, (t) = Taken By, (c) = Cosigned By    Initials Name Provider Type    HANANE Lopez, PT Physical Therapist                Exercises       09/01/17 1300          Subjective Comments    Subjective Comments \"I'm doing alright.\"  -MW      Subjective Pain    Able to rate subjective pain? yes  -MW      Pre-Treatment Pain Level 0  -MW      Post-Treatment Pain Level 0  -MW      Exercise 1    Exercise Name 1 NuStep L5  -MW      Time (Minutes) 1 10   B UE/LE's  -MW      Exercise 2    Exercise Name 2 Sit to stand without UE A and issued as HEP with emphasis on not snapping knees into extension and controlled lowering into into sit  -MW      Exercise 3    Exercise Name 3 DLP  -MW      Sets 3 1  -MW      Time (Minutes) 3 2  -MW      Additional Comments emphasis on not letting B knees hyperextend  -MW        User Key  (r) = Recorded By, (t) = Taken By, (c) = Cosigned By    Initials Name Provider Type    HANANE Lopez, PT Physical Therapist                                  Therapy Education       09/01/17 1425          Therapy Education    Given HEP  -MW      Program New  -MW      How Provided Verbal;Demonstration;Written  -MW      Provided to Patient  -MW      Level of Understanding Verbalized;Demonstrated  -MW        User Key  (r) = Recorded By, (t) = Taken By, (c) = Cosigned By    Initials Name Provider Type    HANANE Lopez, PT Physical Therapist                Time Calculation:   Start Time: 1300  Total Timed Code Minutes- PT: 55 minute(s)     Therapy Charges for Today     Code Description Service Date Service Provider Modifiers Qty    02530638500  PT NEUROMUSC RE EDUCATION EA 15 MIN " 9/1/2017 Meghan Lopez, PT GP 2    22132695137 HC PT THER PROC EA 15 MIN 9/1/2017 Meghan Lopez, PT GP 1    41101434910 HC PT ELEC STIM EA-PER 15 MIN 9/1/2017 Meghan Lopez, PT GP 1                    Meghan Lopez, PT  9/1/2017

## 2017-09-01 NOTE — THERAPY TREATMENT NOTE
Outpatient Occupational Therapy Neuro Treatment Note  [facility]     Patient Name: Yifan Bowman  : 1975  MRN: [id number]  Today's Date: 2017       Visit Date: 2017    Patient Active Problem List   Diagnosis   • MS (multiple sclerosis)   • Left hip pain        Past Medical History:   Diagnosis Date   • MS (multiple sclerosis) 3/23/2017        Past Surgical History:   Procedure Laterality Date   •  SECTION     • REDUCTION MAMMAPLASTY           Visit Dx:    ICD-10-CM ICD-9-CM   1. Decreased independence with activities of daily living Z65.8 V62.89   2. Decreased strength, endurance, and mobility Z74.09 780.99   3. Decreased coordination R27.9 781.3                         OT Assessment/Plan       17 1445       OT Assessment    Assessment Comments good participation and understanding of new theraband HEP and use of theraputty to increase strength for ADL tasks. Increased time to coplete coordination and strengthening with bilateral hands and fatigue noted after approx 10 mins of activity.  -EM     OT Plan    OT Plan Comments Continue per POC  -EM       User Key  (r) = Recorded By, (t) = Taken By, (c) = Cosigned By    Initials Name Provider Type    REID Fuller OTR Occupational Therapist                    Therapy Education       17 1445 17 1425       Therapy Education    Given HEP  -EM HEP  -MW     Program New  -EM New  -MW     How Provided Verbal;Demonstration;Written  -EM Verbal;Demonstration;Written  -MW     Provided to Patient  -EM Patient  -MW     Level of Understanding Verbalized;Demonstrated  -EM Verbalized;Demonstrated  -MW       User Key  (r) = Recorded By, (t) = Taken By, (c) = Cosigned By    Initials Name Provider Type    REID Fuller OTR Occupational Therapist    HANANE Lopez, PT Physical Therapist                Modalities       17 1300          ELECTRICAL STIMULATION    Attended/Unattended Attended   seated in chair with active DF with  contraction  -MW      Stimulation Type --   Russian  -MW      Max mAmp 38  -MW      Location/Electrode Placement/Other Bioness L300 L LE  -MW      Rx Minutes 10 mins  -MW        User Key  (r) = Recorded By, (t) = Taken By, (c) = Cosigned By    Initials Name Provider Type    HANANE Lopez, PT Physical Therapist                OT Exercises       09/01/17 1400          Subjective Comments    Subjective Comments Pt states work told her she can't come back until the doctor says she is okay to work  -EM      Subjective Pain    Able to rate subjective pain? yes  -EM      Pre-Treatment Pain Level 0  -EM      Post-Treatment Pain Level 0  -EM      Exercise 1    Exercise Name 1 UE energy and strength  -EM      Equipment 1 UE Ergometer  -EM      Time (Minutes) 1 5 mins each way at level 6  -EM      Exercise 2    Exercise Name 2 Educated and completed theraband HEP - see HEP for details.  -EM      Exercise 3    Exercise Name 3 coordination and hand strengthening activity using firm putty and beans focusing on in-hand manipulation, tip pinch, and lateral pinch. Increased time with Left hand compared to R hand  -EM      Exercise 4    Exercise Name 4 Hand and digit strength  -EM      Equipment 4 Hand Gripper  -EM      Resistance 4 Green  -EM      Sets 4 3  -EM      Reps 4 10  -EM        User Key  (r) = Recorded By, (t) = Taken By, (c) = Cosigned By    Initials Name Provider Type    EZRA Garcias Occupational Therapist                    Time Calculation:   OT Start Time: 1400  Total Timed Code Minutes- OT: 45 minute(s)     Therapy Charges for Today     Code Description Service Date Service Provider Modifiers Qty    24322736429 HC OT THER PROC EA 15 MIN 9/1/2017 EZRA York GO 2    [id number]  OT NEUROMUSC RE EDUCATION EA 15 MIN 9/1/2017 EZRA York GO 1                    EZRA Gaitan  9/1/2017

## 2017-09-08 ENCOUNTER — HOSPITAL ENCOUNTER (OUTPATIENT)
Dept: SPEECH THERAPY | Facility: HOSPITAL | Age: 42
Setting detail: THERAPIES SERIES
Discharge: HOME | End: 2017-09-08

## 2017-09-08 ENCOUNTER — HOSPITAL ENCOUNTER (OUTPATIENT)
Dept: OCCUPATIONAL THERAPY | Facility: HOSPITAL | Age: 42
Setting detail: THERAPIES SERIES
Discharge: HOME | End: 2017-09-08

## 2017-09-08 ENCOUNTER — HOSPITAL ENCOUNTER (OUTPATIENT)
Dept: PHYSICAL THERAPY | Facility: HOSPITAL | Age: 42
Setting detail: THERAPIES SERIES
Discharge: HOME | End: 2017-09-08

## 2017-09-08 DIAGNOSIS — R26.9 GAIT ABNORMALITY: ICD-10-CM

## 2017-09-08 DIAGNOSIS — R68.89 DECREASED STRENGTH, ENDURANCE, AND MOBILITY: ICD-10-CM

## 2017-09-08 DIAGNOSIS — R26.89 BALANCE PROBLEM: Primary | ICD-10-CM

## 2017-09-08 DIAGNOSIS — R27.8 DECREASED COORDINATION: ICD-10-CM

## 2017-09-08 DIAGNOSIS — R53.1 DECREASED STRENGTH, ENDURANCE, AND MOBILITY: ICD-10-CM

## 2017-09-08 DIAGNOSIS — M25.552 LEFT HIP PAIN: ICD-10-CM

## 2017-09-08 DIAGNOSIS — Z78.9 DECREASED INDEPENDENCE WITH ACTIVITIES OF DAILY LIVING: Primary | ICD-10-CM

## 2017-09-08 DIAGNOSIS — Z74.09 DECREASED STRENGTH, ENDURANCE, AND MOBILITY: ICD-10-CM

## 2017-09-08 DIAGNOSIS — R29.898 LEFT LEG WEAKNESS: ICD-10-CM

## 2017-09-08 DIAGNOSIS — G35 MS (MULTIPLE SCLEROSIS) (HCC): Primary | ICD-10-CM

## 2017-09-08 PROCEDURE — 92523 SPEECH SOUND LANG COMPREHEN: CPT

## 2017-09-08 PROCEDURE — G9168 MEMORY CURRENT STATUS: HCPCS

## 2017-09-08 PROCEDURE — 97530 THERAPEUTIC ACTIVITIES: CPT | Performed by: OCCUPATIONAL THERAPIST

## 2017-09-08 PROCEDURE — G9169 MEMORY GOAL STATUS: HCPCS

## 2017-09-08 PROCEDURE — 97110 THERAPEUTIC EXERCISES: CPT | Performed by: PHYSICAL THERAPIST

## 2017-09-08 PROCEDURE — 97112 NEUROMUSCULAR REEDUCATION: CPT | Performed by: PHYSICAL THERAPIST

## 2017-09-08 PROCEDURE — 97110 THERAPEUTIC EXERCISES: CPT | Performed by: OCCUPATIONAL THERAPIST

## 2017-09-08 NOTE — THERAPY TREATMENT NOTE
"    Outpatient Physical Therapy Neuro Treatment Note  [facility]     Patient Name: Yifan Bowman  : 1975  MRN: [id number]  Today's Date: 2017      Visit Date: 2017    Visit Dx:    ICD-10-CM ICD-9-CM   1. Balance problem R26.89 781.99   2. Gait abnormality R26.9 781.2   3. Left hip pain M25.552 719.45   4. Left leg weakness M62.81 729.89       Patient Active Problem List   Diagnosis   • MS (multiple sclerosis)   • Left hip pain                 PT Neuro       17 1400          Subjective Pain    Able to rate subjective pain? yes  -MW      Pre-Treatment Pain Level 0  -MW      Post-Treatment Pain Level 0  -MW      Balance Skills Training    Training Strategies (Balance Skills) Standing balance on rocker board R/L and ant/post with B cerivcal rot/flex/ext x 10 with min/mod A.  Plyometric jumping on trampoline with B UEA x 10, B hip ab/adduction with B UE A x 10 with min A and standing rest break in between secondary to B LE fatigue. Tall kneeling with fwd/bwd walking with min A.  -        User Key  (r) = Recorded By, (t) = Taken By, (c) = Cosigned By    Initials Name Provider Type    HANANE Lopez, PT Physical Therapist                        PT Assessment/Plan       17 1400       PT Assessment    Assessment Comments Pt. demonstrates B LE fatigue following therapy treatment.  Pt. has difficulty with tall kneeling balance and quadraped.  Pt. continues to demonstrate B knee hyperextension with stance.  Pt. to benefit from continued therapy services.  -     PT Plan    PT Plan Comments Continue with PT services to improve gait, balance, strength and functional mobility.  -       User Key  (r) = Recorded By, (t) = Taken By, (c) = Cosigned By    Initials Name Provider Type    HANANE Lopez, PT Physical Therapist                     Exercises       17 1400          Subjective Comments    Subjective Comments \"[facility] called me  -      Subjective Pain    Able to " rate subjective pain? yes  -MW      Pre-Treatment Pain Level 0  -MW      Post-Treatment Pain Level 0  -MW      Exercise 1    Exercise Name 1 NuStep L5  -MW      Time (Minutes) 1 10   B UE/LE's  -MW      Exercise 2    Exercise Name 2 Quadraped opposite UE/LE's  -MW      Sets 2 1  -MW      Reps 2 5  -MW      Exercise 3    Exercise Name 3 DLP  -MW      Sets 3 1  -MW      Time (Minutes) 3 2  -MW        User Key  (r) = Recorded By, (t) = Taken By, (c) = Cosigned By    Initials Name Provider Type    HANANE Lopez, PT Physical Therapist                                  Therapy Education       09/08/17 1549          Therapy Education    Given Posture/body mechanics;Mobility training  -MW      Program Reinforced  -MW      How Provided Verbal  -MW      Provided to Patient  -MW      Level of Understanding Verbalized;Demonstrated  -MW        User Key  (r) = Recorded By, (t) = Taken By, (c) = Cosigned By    Initials Name Provider Type    HANANE Lopez PT Physical Therapist                Time Calculation:   Start Time: 1400  Total Timed Code Minutes- PT: 55 minute(s)     Therapy Charges for Today     Code Description Service Date Service Provider Modifiers Qty    98841435491  PT NEUROMUSC RE EDUCATION EA 15 MIN 9/8/2017 Meghan Lopez, PT GP 2    99153193218 HC PT THER PROC EA 15 MIN 9/8/2017 Meghan Lopez, PT GP 2                    Meghan Lopez, PT  9/8/2017

## 2017-09-08 NOTE — THERAPY TREATMENT NOTE
"Outpatient Occupational Therapy Neuro Treatment Note  [facility]     Patient Name: Yifan Bowman  : 1975  MRN: [id number]  Today's Date: 2017       Visit Date: 2017    Patient Active Problem List   Diagnosis   • MS (multiple sclerosis)   • Left hip pain        Past Medical History:   Diagnosis Date   • MS (multiple sclerosis) 3/23/2017        Past Surgical History:   Procedure Laterality Date   •  SECTION     • REDUCTION MAMMAPLASTY           Visit Dx:    ICD-10-CM ICD-9-CM   1. Decreased independence with activities of daily living Z65.8 V62.89   2. Decreased strength, endurance, and mobility Z74.09 780.99   3. Decreased coordination R27.9 781.3                         OT Assessment/Plan       17 1414       OT Assessment    Assessment Comments Difficulty with dynamic standing balance UE exercises and reports fatigue and difficulty with B hands after increased time using hands.  -EM     OT Plan    OT Plan Comments Continue per POC  -EM       User Key  (r) = Recorded By, (t) = Taken By, (c) = Cosigned By    Initials Name Provider Type    EM Adriana Fuller OTR Occupational Therapist                            OT Exercises       17 1300          Subjective Comments    Subjective Comments Pt states she is feeling pretty good  -EM      Subjective Pain    Able to rate subjective pain? yes  -EM      Pre-Treatment Pain Level 0  -EM      Post-Treatment Pain Level 0  -EM      Exercise 1    Exercise Name 1 UE energy and strength  -EM      Equipment 1 UE Ergometer  -EM      Time (Minutes) 1 5 mins each way at level 6  -EM      Exercise 2    Exercise Name 2 Pt completed UE and core strengthening activities usin TRX strap for inclined rows, squats, and \"supermans\" - close SBA throughout  -EM      Sets 2 3  -EM      Reps 2 10  -EM      Exercise 3    Exercise Name 3 Overhead press, chest press, lateral raises and front raises, and bicep curls while seated  -EM      Equipment 3 Dumbell  -EM   "    Weights/Plates 3 3  -EM      Sets 3 3  -EM      Reps 3 10  -EM      Exercise 4    Exercise Name 4 digit and hand strength using firm putty with 10 beans focusing on tip pinch, in-hand manipulation, translation and strength.  -EM      Exercise 5    Exercise Name 5 Lateral pinch strength bilateral hands  -EM      Equipment 5 Theraputty  -EM      Resistance 5 Blue  -EM      Sets 5 1  -EM      Reps 5 10  -EM        User Key  (r) = Recorded By, (t) = Taken By, (c) = Cosigned By    Initials Name Provider Type     EZRA York Occupational Therapist                    Time Calculation:   OT Start Time: 1300  Total Timed Code Minutes- OT: 55 minute(s)     Therapy Charges for Today     Code Description Service Date Service Provider Modifiers Qty    70093659060  OT THER PROC EA 15 MIN 9/8/2017 EZRA York GO 2    [id number]  OT THERAPEUTIC ACT EA 15 MIN 9/8/2017 EZRA York GO 2                    EZRA Gaitan  9/8/2017

## 2017-09-08 NOTE — THERAPY EVALUATION
"Outpatient Speech Language Pathology   Adult Speech Language Cognitive Initial Evaluation  [facility]     Patient Name: Yifan Bowman  : 1975  MRN: [id number]  Today's Date: 2017        Visit Date: 2017   Patient Active Problem List   Diagnosis   • MS (multiple sclerosis)   • Left hip pain        Past Medical History:   Diagnosis Date   • MS (multiple sclerosis) 3/23/2017        Past Surgical History:   Procedure Laterality Date   •  SECTION     • REDUCTION MAMMAPLASTY           Visit Dx:    ICD-10-CM ICD-9-CM   1. MS (multiple sclerosis) G35 340                 Adult Speech Language - 17 1500     Background and History    Reason for Referral Pt is a 42 year old female with a new dx of MS and recently seen for PT and OT and was referred for ST services. Pt is able to hold down a job and her daily activities are not impacted by her memory.  -HG    Stated Goals \"To keep working\"  -HG    Previous Functional Status Functional in all spheres  -HG    Primary Language in the Home English  -    Primary Caregiver Mother  -    Informant for the Evaluation Self  -    Comprehension    Recognition WFL: Within Functional Limits  -HG    Answer Questions WFL: Within Functional Limits  -HG    Commands WFL: Within Functional Limits  -HG    Conversation WFL: Within Functional Limits  -HG    Reading Status WFL: Within Functional Limits  -HG    Expression    Primary Mode of Expression verbal  -HG    Dominant Hand Right  -HG    Expressive Language WFL  -HG    Receptive Language WFL  -HG    Cognitive Communication and Memory    Attention to be assessed in therapy  -HG    Orientation WFL: Within Functional Limits  -HG    Memory WFL except;working memory;procedural  -HG    Working Memory Mild moderate  -HG    Procedural Memory Mild moderate  -HG    RBANS- Repeatable Battery for the Assessment of Neuropsychological Status    Immediate Memory Index Score 69  -HG    Immediate Memory Percentile 2 %  -HG "    Immediate Memory Qualitative Description extremely low  -HG    Visuospatial Index Score 66  -HG    Visuospatial Percentile 1 %  -HG    Visuospatial Qualitative Description extremely low  -HG    Language Index Score 90  -HG    Language Percentile 25 %  -HG    Language Qualitative Description average  -HG    Attention Index Score 100  -HG    Attention Percentile 50 %  -HG    Attention Qualitative Description average  -HG    Delayed Memory Index Score 83  -HG    Delayed Memory Percentile 13 %  -HG    Delayed Memory Qualitative Description low average  -HG    Total Index Score 408  -HG    Total Percentile 6 %  -HG    Total Qualitative Description borderline  -HG      User Key  (r) = Recorded By, (t) = Taken By, (c) = Cosigned By    Initials Name Provider Type    HG Birgit STEVENSON MS Kristen [facility]-SLP Speech and Language Pathologist                               OP SLP Education       09/08/17 1500    Education    Barriers to Learning No barriers identified  -HG    Education Provided Patient demonstrated recommended strategies;Patient requires further education on strategies, risks  -    Assessed Learning needs;Learning motivation;Learning preferences;Learning readiness  -    Learning Motivation Strong  -HG    Learning Method Explanation;Demonstration;Teach back;Written materials  -    Teaching Response Verbalized understanding;Demonstrated understanding;Reinforcement needed  -HG    Education Comments Provided pt with internal and external memory strategies.   -HG      User Key  (r) = Recorded By, (t) = Taken By, (c) = Cosigned By    Initials Name Effective Dates    HG Birgit STEVENSON Kristen, MS CCC-SLP 06/22/15 -                 SLP OP Goals       09/08/17 1500       Goal Type Needed    Goal Type Needed Memory;Other Adult Goals  -     Subjective Comments    Subjective Comments Pt alert, cooperative and states that her moms notes memory deficits but she doesn't  -HG     Memory Goals    Patient will be able to remember   information needed to participate in activities of daily living with 90% accuracy.  -HG     Status: Patient will be able to remember  information needed to participate in activities of daily living New  -HG     Patient will demonstrate improved ability to recall information by immediately recalling a series of words 80%:;related;after delay;with cues  -HG     Status: Patient will demonstrate improved ability to recall information by immediately recalling a series of words New  -HG     Patient’s memory skills will be enhanced as reported by patient by utilizing internal memory strategies to recall up to 3 pieces of information after a 5- minute delay 80%:;with cues  -HG     Status: Patient’s memory skills will be enhanced as reported by patient by utilizing internal memory strategies to recall up to 3 pieces of information after a 5- minute delay New  -HG     Patient will demonstrate improved ability to recall information by listening to paragraph and answering yes/no questions 80%:;without cues  -HG     Status: Patient will demonstrate improved ability to recall information by listening to paragraph and answering yes/no questions New  -HG     Other Goals    Other Adult Goal- 1 Pt will complete visuospatial tasks with 80% accuracy.  -HG     Status: Other Adult Goal- 1 New  -HG     Other Adult Goal- 2 Pt will complete thought organization tasks with 80% accuracy.   -HG     Status: Other Adult Goal- 2 New  -HG     SLP Time Calculation    SLP Goal Re-Cert Due Date 10/08/17  -HG       User Key  (r) = Recorded By, (t) = Taken By, (c) = Cosigned By    Initials Name Provider Type    HG Birgit Peterson MS [facility]-SLP Speech and Language Pathologist                OP SLP Assessment/Plan - 09/08/17 1500     SLP Assessment    Functional Problems Speech Language- Adult/Cognition  -HG    Impact on Function: Adult Speech Language/Cognition Trouble learning or remembering new information  -HG    Clinical Impression: Speech  Language-Adult/Congnition Moderate:;Cognitive Communication Impairment  -HG    Clinical Impression Comments RBANS Overall score of 77 falling in the 6th percentile, pt is at a borderline level.  -HG    Please refer to paper survey for additional self-reported information Yes  -HG    Please refer to items scanned into chart for additional diagnostic informaiton and handouts as provided by clinician Yes  -HG    SLP Diagnosis Moderate cognitive linguistic deficit  -HG    Prognosis Excellent (comment)  -HG    Patient/caregiver participated in establishment of treatment plan and goals Yes  -HG    Patient would benefit from skilled therapy intervention Yes  -HG    SLP Plan    Frequency 1x/week  -HG    Duration 8 weeks  -HG    Planned CPT's? SLP SPEECH & LANGUAGE EVAL: 29597;SLP INDIVIDUAL SPEECH THERAPY: 70611  -HG    Expected Duration Therapy Session (min) 45-60 minutes  -HG    Plan Comments Initiate cog tx.   -HG      User Key  (r) = Recorded By, (t) = Taken By, (c) = Cosigned By    Initials Name Provider Type     Birgit Peterson MS CCC-SLP Speech and Language Pathologist                 Time Calculation:   SLP Start Time: 1500    Therapy Charges for Today     Code Description Service Date Service Provider Modifiers Qty    57219787692 HC ST MEMORY CURRENT 9/8/2017 Birgit Peterson MS CCC-SLP GN,  1    83022789516 HC ST MEMORY PROJECTED 9/8/2017 Birgit Peterson MS CCC-SLP GN,  1    98572865333 HC ST EVAL SPEECH AND PROD W LANG  6 9/8/2017 Birgit Peterson MS CCC-SLP GN 1          SLP G-Codes  Functional Limitations: Memory  Memory Current Status (): At least 20 percent but less than 40 percent impaired, limited or restricted  Memory Goal Status (): 0 percent impaired, limited or restricted        Birgit Peterson MS CCC-SLP  9/8/2017

## 2017-09-12 ENCOUNTER — HOSPITAL ENCOUNTER (OUTPATIENT)
Dept: SPEECH THERAPY | Facility: HOSPITAL | Age: 42
Setting detail: THERAPIES SERIES
Discharge: HOME | End: 2017-09-12

## 2017-09-12 DIAGNOSIS — G35 MS (MULTIPLE SCLEROSIS) (HCC): Primary | ICD-10-CM

## 2017-09-12 PROCEDURE — 92507 TX SP LANG VOICE COMM INDIV: CPT

## 2017-09-12 NOTE — THERAPY TREATMENT NOTE
"Outpatient Speech Language Pathology   Adult Speech Language Cognitive Treatment Note   [facility]     Patient Name: Yifan Bowman  : 1975  MRN: [id number]  Today's Date: 2017         Visit Date: 2017   Patient Active Problem List   Diagnosis   • MS (multiple sclerosis)   • Left hip pain          Visit Dx:    ICD-10-CM ICD-9-CM   1. MS (multiple sclerosis) G35 340             Adult Speech Language - 17 1500     Background and History    Reason for Referral Pt is a 42 year old female with a new dx of MS and recently seen for PT and OT and was referred for ST services. Pt is able to hold down a job and her daily activities are not impacted by her memory.  -HG    Stated Goals \"To keep working\"  -HG    Previous Functional Status Functional in all spheres  -HG    Primary Language in the Home English  -HG    Primary Caregiver Mother  -HG    Informant for the Evaluation Self  -HG    Comprehension    Recognition WFL: Within Functional Limits  -HG    Answer Questions WFL: Within Functional Limits  -HG    Commands WFL: Within Functional Limits  -HG    Conversation WFL: Within Functional Limits  -HG    Reading Status WFL: Within Functional Limits  -HG    Expression    Primary Mode of Expression verbal  -HG    Dominant Hand Right  -HG    Expressive Language WFL  -HG    Receptive Language WFL  -HG    Cognitive Communication and Memory    Attention to be assessed in therapy  -HG    Orientation WFL: Within Functional Limits  -HG    Memory WFL except;working memory;procedural  -HG    Working Memory Mild moderate  -HG    Procedural Memory Mild moderate  -HG    RBANS- Repeatable Battery for the Assessment of Neuropsychological Status    Immediate Memory Index Score 69  -HG    Immediate Memory Percentile 2 %  -HG    Immediate Memory Qualitative Description extremely low  -HG    Visuospatial Index Score 66  -HG    Visuospatial Percentile 1 %  -HG    Visuospatial Qualitative Description extremely low  -HG    " Language Index Score 90  -HG    Language Percentile 25 %  -HG    Language Qualitative Description average  -HG    Attention Index Score 100  -HG    Attention Percentile 50 %  -HG    Attention Qualitative Description average  -HG    Delayed Memory Index Score 83  -HG    Delayed Memory Percentile 13 %  -HG    Delayed Memory Qualitative Description low average  -HG    Total Index Score 408  -HG    Total Percentile 6 %  -HG    Total Qualitative Description borderline  -HG      User Key  (r) = Recorded By, (t) = Taken By, (c) = Cosigned By    Initials Name Provider Type     Birgit Peterson MS [facility]-SLP Speech and Language Pathologist                              SLP OP Goals       09/12/17 1500       Goal Type Needed    Goal Type Needed Memory;Other Adult Goals  -HG     Subjective Comments    Subjective Comments Pt alert, cooperative,   -HG     Memory Goals    Patient will be able to remember  information needed to participate in activities of daily living with 90% accuracy.  -HG     Status: Patient will be able to remember  information needed to participate in activities of daily living Progressing as expected  -HG     Comments: Patient will be able to remember  information needed to participate in activities of daily living 9/12/17: Pt is fxning by using a home calendar and currently not using cell phone for appts.   -HG     Patient will demonstrate improved ability to recall information by immediately recalling a series of words 80%:;related;after delay;with cues  -HG     Status: Patient will demonstrate improved ability to recall information by immediately recalling a series of words Progressing as expected  -HG     Comments: Patient will demonstrate improved ability to recall information by immediately recalling a series of words 9/12/17: Visual recall for 4 un-related pictures and pt was T1: 4/4, T2: 4/4, Increased level of difficulty to 5 pictures and pt was 5/5 with delay. 6 pictures, pt was 4/6.      -HG      Patient’s memory skills will be enhanced as reported by patient by utilizing internal memory strategies to recall up to 3 pieces of information after a 5- minute delay 80%:;with cues  -HG     Status: Patient’s memory skills will be enhanced as reported by patient by utilizing internal memory strategies to recall up to 3 pieces of information after a 5- minute delay New  -HG     Patient will demonstrate improved ability to recall information by listening to paragraph and answering yes/no questions 80%:;without cues  -HG     Status: Patient will demonstrate improved ability to recall information by listening to paragraph and answering yes/no questions Progressing as expected  -HG     Comments: Patient will demonstrate improved ability to recall information by listening to paragraph and answering yes/no questions 9/12/17:  short paragraph recall: pt was 3/3.   -HG     Other Goals    Other Adult Goal- 1 Pt will complete visuospatial tasks with 80% accuracy.  -HG     Status: Other Adult Goal- 1 Progressing as expected  -HG     Comments: Other Adult Goal- 1 9/12/17:  For basic visuospatial tasks, pt was 70% accurate.   -HG     Other Adult Goal- 2 Pt will complete thought organization tasks with 80% accuracy.   -HG     Status: Other Adult Goal- 2 Progressing as expected  -HG     Comments: Other Adult Goal- 2 9/12/17:  abstract category naming: 10/10,  8/10,   -HG     SLP Time Calculation    SLP Goal Re-Cert Due Date 10/08/17  -HG       User Key  (r) = Recorded By, (t) = Taken By, (c) = Cosigned By    Initials Name Provider Type    KIANA Peterson MS PHAN-SLP Speech and Language Pathologist                OP SLP Education       09/12/17 1500    Education    Education Comments Homework for visuospatial recall.   -HG      User Key  (r) = Recorded By, (t) = Taken By, (c) = Cosigned By    Initials Name Effective Dates    HG Birgit STEVENSON Kristen MS PHAN-SLP 06/22/15 -                 OP SLP Assessment/Plan - 09/12/17 1500      SLP Plan    Plan Comments Cont with Cog tx.   -HG      User Key  (r) = Recorded By, (t) = Taken By, (c) = Cosigned By    Initials Name Provider Type     Birgit Peterson MS CCC-SLP Speech and Language Pathologist                 Time Calculation:   SLP Start Time: 1500    Therapy Charges for Today     Code Description Service Date Service Provider Modifiers Qty    62134253836 HC ST TREATMENT SPEECH 4 9/12/2017 Birgit Peterson MS CCC-SLP GN 1                   Birgit Peterson MS CCC-SLP  9/12/2017

## 2017-09-14 ENCOUNTER — APPOINTMENT (OUTPATIENT)
Dept: PHYSICAL THERAPY | Facility: HOSPITAL | Age: 42
End: 2017-09-14

## 2017-09-15 ENCOUNTER — HOSPITAL ENCOUNTER (OUTPATIENT)
Dept: PHYSICAL THERAPY | Facility: HOSPITAL | Age: 42
Setting detail: THERAPIES SERIES
Discharge: HOME | End: 2017-09-15

## 2017-09-15 ENCOUNTER — HOSPITAL ENCOUNTER (OUTPATIENT)
Dept: OCCUPATIONAL THERAPY | Facility: HOSPITAL | Age: 42
Setting detail: THERAPIES SERIES
Discharge: HOME | End: 2017-09-15

## 2017-09-15 DIAGNOSIS — R53.1 DECREASED STRENGTH, ENDURANCE, AND MOBILITY: ICD-10-CM

## 2017-09-15 DIAGNOSIS — R26.89 BALANCE PROBLEM: Primary | ICD-10-CM

## 2017-09-15 DIAGNOSIS — R68.89 DECREASED STRENGTH, ENDURANCE, AND MOBILITY: ICD-10-CM

## 2017-09-15 DIAGNOSIS — Z78.9 DECREASED INDEPENDENCE WITH ACTIVITIES OF DAILY LIVING: Primary | ICD-10-CM

## 2017-09-15 DIAGNOSIS — Z74.09 DECREASED STRENGTH, ENDURANCE, AND MOBILITY: ICD-10-CM

## 2017-09-15 DIAGNOSIS — R26.9 GAIT ABNORMALITY: ICD-10-CM

## 2017-09-15 DIAGNOSIS — R27.8 DECREASED COORDINATION: ICD-10-CM

## 2017-09-15 PROCEDURE — 97110 THERAPEUTIC EXERCISES: CPT | Performed by: PHYSICAL THERAPIST

## 2017-09-15 PROCEDURE — 97112 NEUROMUSCULAR REEDUCATION: CPT | Performed by: OCCUPATIONAL THERAPIST

## 2017-09-15 PROCEDURE — 97112 NEUROMUSCULAR REEDUCATION: CPT | Performed by: PHYSICAL THERAPIST

## 2017-09-15 PROCEDURE — 97110 THERAPEUTIC EXERCISES: CPT | Performed by: OCCUPATIONAL THERAPIST

## 2017-09-15 NOTE — THERAPY TREATMENT NOTE
Outpatient Occupational Therapy Neuro Treatment Note  [facility]     Patient Name: Yifan Bowman  : 1975  MRN: [id number]  Today's Date: 9/15/2017       Visit Date: 09/15/2017    Patient Active Problem List   Diagnosis   • MS (multiple sclerosis)   • Left hip pain        Past Medical History:   Diagnosis Date   • MS (multiple sclerosis) 3/23/2017        Past Surgical History:   Procedure Laterality Date   •  SECTION     • REDUCTION MAMMAPLASTY           Visit Dx:    ICD-10-CM ICD-9-CM   1. Decreased independence with activities of daily living Z65.8 V62.89   2. Decreased strength, endurance, and mobility Z74.09 780.99   3. Decreased coordination R27.9 781.3                         OT Assessment/Plan       09/15/17 1616       OT Assessment    Assessment Comments Good participation throughout. Continues to demo fatigue and impaired balance and decreased coordination in L; however improving.   -EM     OT Plan    OT Plan Comments Continue per POC  -EM       User Key  (r) = Recorded By, (t) = Taken By, (c) = Cosigned By    Initials Name Provider Type    EM Adriana Fuller OTR Occupational Therapist                    Therapy Education       09/15/17 1615 09/15/17 1459       Therapy Education    Given HEP  -EM Symptoms/condition management;Posture/body mechanics;Mobility training  -MW     Program Reinforced  -EM Reinforced  -MW     How Provided Verbal  -EM Verbal  -MW     Provided to Patient  -EM Patient  -MW     Level of Understanding Verbalized  -EM Verbalized  -MW       User Key  (r) = Recorded By, (t) = Taken By, (c) = Cosigned By    Initials Name Provider Type    REID Fuller OTR Occupational Therapist    MW Meghan Lopez, PT Physical Therapist                    OT Exercises       09/15/17 1500 09/15/17 1400       Subjective Comments    Subjective Comments Pt states feeling a little tired today  -EM      Subjective Pain    Able to rate subjective pain? yes  -EM --  -EM     Pre-Treatment Pain  Level 0  -EM      Post-Treatment Pain Level 0  -EM      Exercise 1    Exercise Name 1 UE energy and strength  -EM      Equipment 1 UE Ergometer  -EM      Time (Minutes) 1 6 mins each way at level 6  -EM      Exercise 2    Exercise Name 2 Shoulder extension and rows while standing on airex with SBA-CGA for balance  -EM      Equipment 2 Theraband  -EM      Resistance 2 Red  -EM      Sets 2 3  -EM      Reps 2 10  -EM      Exercise 3    Exercise Name 3 wrist ext/flex, supination/pronation  -EM      Equipment 3 Dumbell  -EM      Weights/Plates 3 2  -EM      Sets 3 3  -EM      Reps 3 10  -EM      Exercise 4    Exercise Name 4 Hand and digit strength for tip pinch, lateral pinch  -EM      Equipment 4 Theraputty  -EM      Resistance 4 Blue  -EM      Sets 4 1  -EM      Reps 4 10  -EM        User Key  (r) = Recorded By, (t) = Taken By, (c) = Cosigned By    Initials Name Provider Type    EM EZRA York Occupational Therapist                    Time Calculation:   OT Start Time: 1500  Total Timed Code Minutes- OT: 45 minute(s)     Therapy Charges for Today     Code Description Service Date Service Provider Modifiers Qty    46158717473  OT NEUROMUSC RE EDUCATION EA 15 MIN 9/15/2017 EZRA York GO 2    [id number]  OT THER PROC EA 15 MIN 9/15/2017 EZRA York GO 1                    EZRA Gaitan  9/15/2017

## 2017-09-15 NOTE — THERAPY TREATMENT NOTE
"    Outpatient Physical Therapy Neuro Treatment Note  [facility]     Patient Name: Yifan Bowman  : 1975  MRN: [id number]  Today's Date: 9/15/2017      Visit Date: 09/15/2017    Visit Dx:    ICD-10-CM ICD-9-CM   1. Balance problem R26.89 781.99   2. Gait abnormality R26.9 781.2       Patient Active Problem List   Diagnosis   • MS (multiple sclerosis)   • Left hip pain                 PT Neuro       09/15/17 1400          Subjective Comments    Subjective Comments \"I went and got fitted for a brace, it will take a couple of weeks they said.\"  -MW      Subjective Pain    Able to rate subjective pain? yes  -MW      Pre-Treatment Pain Level 0  -MW      Post-Treatment Pain Level 0  -MW      Balance Skills Training    Training Strategies (Balance Skills) Standing balance with alternating fwd lunges to BOSU x 10 without UE A with mod A, standing on rounded side of BOSU with B knee flexion and VCing to increase B knee flexion in standing with mod A, holding 2# bar and pressing overhead x 10 with mod/max A.    -        User Key  (r) = Recorded By, (t) = Taken By, (c) = Cosigned By    Initials Name Provider Type    HANANE Lopez, PT Physical Therapist                        PT Assessment/Plan       09/15/17 1400       PT Assessment    Assessment Comments Pt. requires min to max A with LOB on uneven surfaces with emphasis on maintaining B knee flexion in standing.  Pt. requires several seated rest breaks secondary to fatigue. Pt. to benefit from continued therapy services.  -     PT Plan    PT Plan Comments Continue with PT services to improve gait, balance, strength and functional mobility.  -       User Key  (r) = Recorded By, (t) = Taken By, (c) = Cosigned By    Initials Name Provider Type    HANANE Lopez, PT Physical Therapist                     Exercises       09/15/17 1400          Subjective Comments    Subjective Comments \"I went and got fitted for a brace, it will take a couple of weeks " "they said.\"  -MW      Subjective Pain    Able to rate subjective pain? yes  -MW      Pre-Treatment Pain Level 0  -MW      Post-Treatment Pain Level 0  -MW      Exercise 1    Exercise Name 1 NuStep L6  -MW      Time (Minutes) 1 10   B UE/LE's  -MW      Exercise 2    Exercise Name 2 Quadraped opposite UE/LE's  -MW      Sets 2 1  -MW      Reps 2 5  -MW      Exercise 3    Exercise Name 3 DLP  -MW      Sets 3 1  -MW      Time (Minutes) 3 2  -MW      Exercise 4    Exercise Name 4 Quadraped B fire hydrants  -MW      Sets 4 1  -MW      Reps 4 10  -MW      Exercise 5    Exercise Name 5 Quadraped B hip circles  -MW      Sets 5 1  -MW      Reps 5 10  -MW      Exercise 6    Exercise Name 6 Prone full and modified plank  -MW      Sets 6 1  -MW      Reps 6 5  -MW      Exercise 7    Exercise Name 7 B side mod plank  -MW      Sets 7 1  -MW      Reps 7 5  -MW        User Key  (r) = Recorded By, (t) = Taken By, (c) = Cosigned By    Initials Name Provider Type    HANANE Lopez PT Physical Therapist                                  Therapy Education       09/15/17 6153          Therapy Education    Given Symptoms/condition management;Posture/body mechanics;Mobility training  -MW      Program Reinforced  -MW      How Provided Verbal  -MW      Provided to Patient  -MW      Level of Understanding Verbalized  -MW        User Key  (r) = Recorded By, (t) = Taken By, (c) = Cosigned By    Initials Name Provider Type    HANANE Lopez PT Physical Therapist                Time Calculation:   Start Time: 1400  Total Timed Code Minutes- PT: 57 minute(s)     Therapy Charges for Today     Code Description Service Date Service Provider Modifiers Qty    59252387024  PT NEUROMUSC RE EDUCATION EA 15 MIN 9/15/2017 Meghan Lopez, PT GP 2    18529391291 HC PT THER PROC EA 15 MIN 9/15/2017 Meghan Lopez, PT GP 2                    Meghan Lopez PT  9/15/2017       "

## 2017-09-18 ENCOUNTER — HOSPITAL ENCOUNTER (OUTPATIENT)
Dept: SPEECH THERAPY | Facility: HOSPITAL | Age: 42
Setting detail: THERAPIES SERIES
Discharge: HOME | End: 2017-09-18

## 2017-09-18 DIAGNOSIS — G35 MS (MULTIPLE SCLEROSIS) (HCC): Primary | ICD-10-CM

## 2017-09-18 PROCEDURE — 92507 TX SP LANG VOICE COMM INDIV: CPT

## 2017-09-18 NOTE — THERAPY TREATMENT NOTE
Outpatient Speech Language Pathology   Adult Speech Language Cognitive Treatment Note   [facility]     Patient Name: Yifan Bowman  : 1975  MRN: [id number]  Today's Date: 2017         Visit Date: 2017   Patient Active Problem List   Diagnosis   • MS (multiple sclerosis)   • Left hip pain          Visit Dx:    ICD-10-CM ICD-9-CM   1. MS (multiple sclerosis) G35 340                               SLP OP Goals       17 1430       Goal Type Needed    Goal Type Needed Memory;Other Adult Goals  -HG     Subjective Comments    Subjective Comments Pt alert, cooperative, feeling tired this date.   -HG     Memory Goals    Patient will be able to remember  information needed to participate in activities of daily living with 90% accuracy.  -HG     Status: Patient will be able to remember  information needed to participate in activities of daily living Progressing as expected  -HG     Comments: Patient will be able to remember  information needed to participate in activities of daily living 17: Pt is fxning by using a home calendar and currently not using cell phone for appts.   -HG     Patient will demonstrate improved ability to recall information by immediately recalling a series of words 80%:;related;after delay;with cues  -HG     Status: Patient will demonstrate improved ability to recall information by immediately recalling a series of words Progressing as expected  -HG     Comments: Patient will demonstrate improved ability to recall information by immediately recalling a series of words 17:6 un-related pictures from previous session and pt was 5/6, 6/6, 6/6 17: Visual recall for 4 un-related pictures and pt was T1: 4/4, T2: 4/4, Increased level of difficulty to 5 pictures and pt was 5/5 with delay. 6 pictures, pt was 4/6.      -HG     Patient’s memory skills will be enhanced as reported by patient by utilizing internal memory strategies to recall up to 3 pieces of information after  a 5- minute delay 80%:;with cues  -HG     Status: Patient’s memory skills will be enhanced as reported by patient by utilizing internal memory strategies to recall up to 3 pieces of information after a 5- minute delay New  -HG     Patient will demonstrate improved ability to recall information by listening to paragraph and answering yes/no questions 80%:;without cues  -HG     Status: Patient will demonstrate improved ability to recall information by listening to paragraph and answering yes/no questions Progressing as expected  -HG     Comments: Patient will demonstrate improved ability to recall information by listening to paragraph and answering yes/no questions 9/18/17: 22-36 word paragraphs: T1: 3/3, T2: 2/3 T3: 3/3.  36-50 words: T1: 4/4, T2: 4/4, 50-65 Words: 5/6, 9/12/17:  short paragraph recall: pt was 3/3.   -HG     Other Goals    Other Adult Goal- 1 Pt will complete visuospatial tasks with 80% accuracy.  -HG     Status: Other Adult Goal- 1 Progressing as expected  -HG     Comments: Other Adult Goal- 1 9/18/17: Visual recall for picture scene: pt was 80% accurate. 9/12/17:  For basic visuospatial tasks, pt was 70% accurate.   -HG     Other Adult Goal- 2 Pt will complete thought organization tasks with 80% accuracy.   -HG     Status: Other Adult Goal- 2 Progressing as expected  -HG     Comments: Other Adult Goal- 2 9/18/17: Scrambled words: pt was 50% accurate independently. 9/12/17:  abstract category naming: 10/10,  8/10,   -HG     SLP Time Calculation    SLP Goal Re-Cert Due Date 10/08/17  -HG       User Key  (r) = Recorded By, (t) = Taken By, (c) = Cosigned By    Initials Name Provider Type    KIANA Birgit Peterson MS [facility]-SLP Speech and Language Pathologist                OP SLP Education       09/18/17 1430    Education    Education Comments Homework cont'd for visuospatial recall.   -HG      User Key  (r) = Recorded By, (t) = Taken By, (c) = Cosigned By    Initials Name Effective Dates    KIANA Birgit STEVENSON  MS Kristen CCC-SLP 06/22/15 -                 OP SLP Assessment/Plan - 09/18/17 1430     SLP Plan    Plan Comments Cont with Cog tx.   -HG      User Key  (r) = Recorded By, (t) = Taken By, (c) = Cosigned By    Initials Name Provider Type     Birgit Peterson MS CCC-SLP Speech and Language Pathologist                 Time Calculation:   SLP Start Time: 1430    Therapy Charges for Today     Code Description Service Date Service Provider Modifiers Qty    [id number]  ST TREATMENT SPEECH 5 9/18/2017 Birgit Peterson MS CCC-SLP GN 1                   Birgit Peterson MS CCC-SLP  9/18/2017

## 2017-09-19 ENCOUNTER — APPOINTMENT (OUTPATIENT)
Dept: SPEECH THERAPY | Facility: HOSPITAL | Age: 42
End: 2017-09-19

## 2017-09-19 ENCOUNTER — HOSPITAL ENCOUNTER (OUTPATIENT)
Dept: OCCUPATIONAL THERAPY | Facility: HOSPITAL | Age: 42
Setting detail: THERAPIES SERIES
Discharge: HOME | End: 2017-09-19

## 2017-09-19 ENCOUNTER — HOSPITAL ENCOUNTER (OUTPATIENT)
Dept: PHYSICAL THERAPY | Facility: HOSPITAL | Age: 42
Setting detail: THERAPIES SERIES
Discharge: HOME | End: 2017-09-19

## 2017-09-19 DIAGNOSIS — R27.8 DECREASED COORDINATION: ICD-10-CM

## 2017-09-19 DIAGNOSIS — Z74.09 DECREASED STRENGTH, ENDURANCE, AND MOBILITY: ICD-10-CM

## 2017-09-19 DIAGNOSIS — R26.9 GAIT ABNORMALITY: ICD-10-CM

## 2017-09-19 DIAGNOSIS — R53.1 DECREASED STRENGTH, ENDURANCE, AND MOBILITY: ICD-10-CM

## 2017-09-19 DIAGNOSIS — Z78.9 DECREASED INDEPENDENCE WITH ACTIVITIES OF DAILY LIVING: Primary | ICD-10-CM

## 2017-09-19 DIAGNOSIS — R26.89 BALANCE PROBLEM: Primary | ICD-10-CM

## 2017-09-19 DIAGNOSIS — R68.89 DECREASED STRENGTH, ENDURANCE, AND MOBILITY: ICD-10-CM

## 2017-09-19 PROCEDURE — 97110 THERAPEUTIC EXERCISES: CPT | Performed by: OCCUPATIONAL THERAPIST

## 2017-09-19 PROCEDURE — 97110 THERAPEUTIC EXERCISES: CPT | Performed by: PHYSICAL THERAPIST

## 2017-09-19 PROCEDURE — 97112 NEUROMUSCULAR REEDUCATION: CPT | Performed by: PHYSICAL THERAPIST

## 2017-09-19 PROCEDURE — 97530 THERAPEUTIC ACTIVITIES: CPT | Performed by: OCCUPATIONAL THERAPIST

## 2017-09-19 NOTE — THERAPY PROGRESS REPORT/RE-CERT
"    Outpatient Physical Therapy Neuro Re-Assessment  [facility]     Patient Name: Yifan Bowman  : 1975  MRN: [id number]  Today's Date: 2017      Visit Date: 2017    Patient Active Problem List   Diagnosis   • MS (multiple sclerosis)   • Left hip pain        Past Medical History:   Diagnosis Date   • MS (multiple sclerosis) 3/23/2017        Past Surgical History:   Procedure Laterality Date   •  SECTION     • REDUCTION MAMMAPLASTY           Visit Dx:     ICD-10-CM ICD-9-CM   1. Balance problem R26.89 781.99   2. Gait abnormality R26.9 781.2                     PT Neuro       17 1300          Subjective Comments    Subjective Comments \"I'm doing alright I guess.  There is one other medicine that Dr. Coronado put me on and I will start taking it today.\"  -MW      Subjective Pain    Able to rate subjective pain? yes  -MW      Pre-Treatment Pain Level 6  -MW      Post-Treatment Pain Level 6  -MW      Subjective Pain Comment L hip pain  -MW      Balance Skills Training    Training Strategies (Balance Skills) Re-assessment completed, see for details.  Standing balance with alteranting tapping cones x 10 without UE A x 10 with min A, standing on blue foam with alternating tapping cones x 10 with min/mod A witih LOB up to 60% of the time.  Standing on blue foam with narrow DANELLE and B staggered with EC with min A and then B cerivcal rot/flex/ext x 10 with min/mod A with LOB up to 50% of the time.    -MW        User Key  (r) = Recorded By, (t) = Taken By, (c) = Cosigned By    Initials Name Provider Type    MW Meghan Lopez, PT Physical Therapist                        Therapy Education       17 1412          Therapy Education    Given HEP  -MW      Program New  -MW      How Provided Verbal;Demonstration;Written  -MW      Provided to Patient  -MW      Level of Understanding Verbalized;Demonstrated  -MW        User Key  (r) = Recorded By, (t) = Taken By, (c) = Cosigned By    Initials " Name Provider Type    HANANE Lopez, PT Physical Therapist                PT OP Goals       09/19/17 1300       PT Short Term Goals    STG Date to Achieve 10/04/17  -MW     STG 1 Patient to improve STEINER balance score to >/= 49/56 to decrease client's risk of falls.  -MW     STG 1 Progress Met  -MW     STG 2 Patient to perform TUG within 12 sec without LOB for improved functional mobility.  -MW     STG 2 Progress Met  -MW     STG 3 Patient to improve FGA score to >/= 15/30 to decrease client's risk of falls.  -MW     STG 3 Progress Ongoing  -MW     STG 4 Pt. to improve DGI score to >/=  /24 to decrease pts risk of falls.  -MW     STG 4 Progress Ongoing  -MW     Long Term Goals    LTG Date to Achieve 11/15/17  -MW     LTG 1 Patient to improve STEINER balance score to >/= 54/56 to decrease client's risk of falls.  -MW     LTG 1 Progress Ongoing  -MW     LTG 2 Patient to perform TUG within 10.5 sec without LOB for improved functional mobility.  -MW     LTG 2 Progress Met  -MW     LTG 3 Patient to improve FGA score to >/= 22/30 to decrease client's risk of falls.  -MW     LTG 3 Progress Ongoing  -MW     LTG 4 Pt. to improve DGI score to >/=  21/24 to decrease pts risk of falls.  -MW     LTG 4 Progress Ongoing  -MW     LTG 5 Patient to ambulate 25 feet without AD within 8.5 sec without LOB at a regular pace for improved gait jena  -MW     LTG 5 Progress Met  -MW     LTG 6 Patient to be I with HEP  -MW     LTG 6 Progress Ongoing  -MW       User Key  (r) = Recorded By, (t) = Taken By, (c) = Cosigned By    Initials Name Provider Type    HANANE Lopez, PT Physical Therapist                PT Assessment/Plan       09/19/17 1300       PT Assessment    Functional Limitations Decreased safety during functional activities;Impaired gait;Impaired locomotion;Limitation in home management;Limitations in community activities;Performance in leisure activities  -     Impairments Balance;Coordination;Endurance;Gait;Impaired  "flexibility;Locomotion;Muscle strength;Pain;Sensation;Range of motion   increased tone L LE  -MW     Assessment Comments Pt. met STG for TUG and STEINER and LTG for 25 ft walk.  Pt. has improved in jena and is progressing well towards balance goals.  Pt. issued additional HEP for strengthening and VCing to keep B knees slightly bent with standing activities.  Pt. to benefit from continued therapy services to improve functional mobility.  -MW     Please refer to paper survey for additional self-reported information Yes  -MW     Rehab Potential Good  -MW     Patient/caregiver participated in establishment of treatment plan and goals Yes  -MW     Patient would benefit from skilled therapy intervention Yes  -MW     PT Plan    PT Frequency 1x/week  -MW     Predicted Duration of Therapy Intervention (days/wks) 8 weeks  -MW     Planned CPT's? PT THER PROC EA 15 MIN: 42793;PT NEUROMUSC RE-EDUCATION EA 15 MIN: 53870;PT GAIT TRAINING EA 15 MIN: 90361;PT ELECTRICAL STIM UNATTEND: ;PT ELECTRICAL STIM ATTD EA 15 MIN: 95134  -MW     PT Plan Comments Continue with PT services to improve gait, balance, strength, transfers and functional mobility.  -MW       User Key  (r) = Recorded By, (t) = Taken By, (c) = Cosigned By    Initials Name Provider Type    MW Meghan Lopez, PT Physical Therapist                   Exercises       09/19/17 1300          Subjective Comments    Subjective Comments \"I'm doing alright I guess.  There is one other medicine that Dr. Coronado put me on and I will start taking it today.\"  -MW      Subjective Pain    Able to rate subjective pain? yes  -MW      Pre-Treatment Pain Level 6  -MW      Post-Treatment Pain Level 6  -MW      Subjective Pain Comment L hip pain  -MW      Exercise 1    Exercise Name 1 Sci[employer] L6  -MW      Time (Minutes) 1 10   5 min fwd/5 min bwd  -MW      Exercise 2    Exercise Name 2 B sidestepping with red t-band  -MW      Additional Comments in parallel bars x 5 laps.  Issued as " HEP  -MW      Exercise 3    Exercise Name 3 Prone B knee flexion with and without yellow tband  -MW      Sets 3 2  -MW      Reps 3 10  -MW      Additional Comments Issued as HEP.  -MW        User Key  (r) = Recorded By, (t) = Taken By, (c) = Cosigned By    Initials Name Provider Type    HANANE Lopez, PT Physical Therapist                            Outcome Measures       09/19/17 1400 09/19/17 1400       25 Foot Walk    Walk #1  8.42 seconds  -MW     Was an Assistive Device Used?  No  -MW     9 Hole Peg    9-Hole Peg Left 23.03  -EM      9-Hole Peg Right 22.08  -EM      Loyd Balance Scale    Sitting to Standing  4  -MW     Standing Unsupported  4  -MW     Sitting with Back Unsupported but Feet Supported on Floor or on Stool  4  -MW     Standing to Sitting  4  -MW     Transfers  4  -MW     Standing Unsupported with Eyes Closed  4  -MW     Standing Unsupported with Feet Together  4  -MW     Reaching Forward with Outstretched Arm While Standing  4  -MW      Object From the Floor From a Standing Position  4  -MW     Turning to Look Behind Over Left and Right Shoulders While Standing  4  -MW     Turn 360 Degrees  4  -MW     Place Alternate Foot on Step or Stool While Standing Unsupported  2  -MW     Standing Unsupported with One Foot in Front  2  -MW     Standing on One Leg  1  -MW     Loyd Total Score  49  -MW     Purdue Pegboard    Left Hand (30 seconds) 11  -EM      Right Hand (30 seconds) 12  -EM      Both Hands (30 seconds) 8  -EM      Right + Left + Both Hands 31  -EM      Assembly (60 seconds) 24  -EM      Timed Up and Go (TUG)    TUG Test 1  9.39 seconds  -MW     Functional Assessment    Outcome Measure Options  25 Foot Walk;Loyd Balance;Timed Up and Go (TUG)  -MW       User Key  (r) = Recorded By, (t) = Taken By, (c) = Cosigned By    Initials Name Provider Type    EM Adriana Fuller OTR Occupational Therapist    HANANE Lopez, PT Physical Therapist          Time Calculation:   Start Time:  1300  Total Timed Code Minutes- PT: 55 minute(s)     Therapy Charges for Today     Code Description Service Date Service Provider Modifiers Qty    03942109136 HC PT NEUROMUSC RE EDUCATION EA 15 MIN 9/19/2017 Meghan Lopez, PT GP 2    09819908391 HC PT THER PROC EA 15 MIN 9/19/2017 Meghan Lopez, PT GP 2          PT G-Codes  Outcome Measure Options: 25 Foot Walk, Loyd Balance, Timed Up and Go (TUG)         Meghan Lopez, PT  9/19/2017

## 2017-09-19 NOTE — THERAPY PROGRESS REPORT/RE-CERT
Outpatient Occupational Therapy Neuro Re-Assessment  [facility]     Patient Name: Yifan Bowman  : 1975  MRN: [id number]  Today's Date: 2017      Visit Date: 2017    Patient Active Problem List   Diagnosis   • MS (multiple sclerosis)   • Left hip pain        Past Medical History:   Diagnosis Date   • MS (multiple sclerosis) 3/23/2017        Past Surgical History:   Procedure Laterality Date   •  SECTION     • REDUCTION MAMMAPLASTY           Visit Dx:      ICD-10-CM ICD-9-CM   1. Decreased independence with activities of daily living Z65.8 V62.89   2. Decreased strength, endurance, and mobility Z74.09 780.99   3. Decreased coordination R27.9 781.3                 OT Neuro       17 1400          Subjective Comments    Subjective Comments Pt states she's tired but feeling pretty good  -EM      Subjective Pain    Able to rate subjective pain? yes  -EM      Pre-Treatment Pain Level 0  -EM      Post-Treatment Pain Level 0  -EM      Subjective Pain Comment L leg bothering her but not painful  -EM      Left Shoulder    Flexion Gross Movement (4/5) good  -EM      ABduction Gross Movement (4/5) good  -EM      Right Shoulder    Flexion Gross Movement (4/5) good  -EM      ABduction Gross Movement (4/5) good  -EM      Left Elbow/Forearm    Elbow Flexion Gross Movement (5/5) normal  -EM      Elbow Extension Gross Movement (5/5) normal  -EM      Right Elbow/Forearm    Elbow Flexion Gross Movement (5/5) normal  -EM      Elbow Extension Gross Movement (5/5) normal  -EM      Left Wrist    Wrist Flexion Gross Movement (5/5) normal  -EM      Wrist Extension Gross Movement (5/5) normal  -EM      Right Wrist    Wrist Flexion Gross Movement (5/5) normal  -EM      Wrist Extension Gross Movement (5/5) normal  -EM      ADL Assessment/Intervention    IADL Assess/Train, Comment Limited  changes since initial evaluation  -EM        User Key  (r) = Recorded By, (t) = Taken By, (c) = Cosigned By    Initials  Name Provider Type    REID Fuller, OTR Occupational Therapist             Hand Therapy (last 24 hours)      Hand Eval       09/19/17 1400           Strength Right    # Reps 3  -EM      Right Rung 2  -EM      Right  Test 1 40  -EM      Right  Test 2 35  -EM      Right  Test 3 32  -EM       Strength Average Right 35.67  -EM       Strength Left    # Reps 3  -EM      Left Rung 2  -EM      Left  Test 1 46  -EM      Left  Test 2 32  -EM      Left  Test 3 29  -EM       Strength Average Left 35.67  -EM      Right Hand Strength - Pinch (lbs)    Lateral 12.3 lbs  -EM      Left Hand Strength - Pinch (lbs)    Lateral 10.3 lbs  -EM        User Key  (r) = Recorded By, (t) = Taken By, (c) = Cosigned By    Initials Name Provider Type    REID Srbronson BRADLEY Monk OTR Occupational Therapist                    Therapy Education       09/19/17 1412          Therapy Education    Given HEP  -MW      Program New  -MW      How Provided Verbal;Demonstration;Written  -MW      Provided to Patient  -MW      Level of Understanding Verbalized;Demonstrated  -MW        User Key  (r) = Recorded By, (t) = Taken By, (c) = Cosigned By    Initials Name Provider Type    HANANE Lopez, PT Physical Therapist                OT Goals       09/19/17 1400       OT Short Term Goals    STG Date to Achieve 09/20/17  -EM     STG 1 Pt will be educated in UE HEP to increase overall strength and functional use of B UEs for ADL tasks  -EM     STG 1 Progress Ongoing  -EM     STG 2 Pt will be educated in HEP to increase coordination and sensation in B hands to increase functional use  -EM     STG 2 Progress Ongoing  -EM     STG 3 Pt will participate in dynamic standing activities with min A while incorporating B hands to increase functional balance and mobility  -EM     STG 3 Progress Ongoing  -EM     Long Term Goals    LTG Date to Achieve 10/18/17  -EM     LTG 1 Pt will be independent with all HEPs  -EM     LTG 1 Progress  Ongoing  -EM     LTG 2 Patient will participate in dynamic standing activities with SBA while incorporating B UEs to increase functional balance and mobility.   -EM     LTG 2 Progress Ongoing  -EM     LTG 3 Pt will score < 23 secs bilaterally on 9 HPT to indicate increased coordination for ADL takss  -EM     LTG 3 Progress Partially Met  -EM     Time Calculation    OT Goal Re-Cert Due Date 12/18/17  -EM       User Key  (r) = Recorded By, (t) = Taken By, (c) = Cosigned By    Initials Name Provider Type    EM Adriana Fuller, OTR Occupational Therapist                OT Assessment/Plan       09/19/17 1459 09/19/17 1300    OT Assessment    Assessment Comments Pt demo increased FMC, increased energy, and increased confidence with balance during functional tasks.  Limited change in  strength and overall continues to demo decreased energy, balance, strength and coordination. However, improving very well.  -EM     OT Plan    OT Frequency 1x/week  -EM     Predicted Duration of Therapy Intervention (days/wks) 4 weeks  -EM 8 weeks  -MW    OT Plan Comments Continue per initial POC to further progress toward goals as specified.   -EM       User Key  (r) = Recorded By, (t) = Taken By, (c) = Cosigned By    Initials Name Provider Type    EM Adriana Fuller, OTR Occupational Therapist    MW Meghan Lopez, PT Physical Therapist                OT Exercises       09/19/17 1400          Exercise 1    Exercise Name 1 Energy and strength using nu-step  -EM      Time (Minutes) 1 10 mins at level 5  -EM      Exercise 2    Exercise Name 2 Pt completed dynamic standing and reaching activity lunging onto BOSU while reaching above head to place resistive peg onto band with CGA when lunging with R LE and min-mod A for balance when lunging with L LE. Completed 5 on each side and then lunged up to retrieve pegs (5 each side).  -EM      Exercise 3    Exercise Name 3 completed FMC activities using tweezer to  small objects with increased  time to complete.  -EM        User Key  (r) = Recorded By, (t) = Taken By, (c) = Cosigned By    Initials Name Provider Type    EM Adriana uFller OTR Occupational Therapist                    Outcome Measures       09/19/17 1400 09/19/17 1400       25 Foot Walk    Walk #1  8.42 seconds  -MW     Was an Assistive Device Used?  No  -MW     9 Hole Peg    9-Hole Peg Left 23.03  -EM      9-Hole Peg Right 22.08  -EM      Loyd Balance Scale    Sitting to Standing  4  -MW     Standing Unsupported  4  -MW     Sitting with Back Unsupported but Feet Supported on Floor or on Stool  4  -MW     Standing to Sitting  4  -MW     Transfers  4  -MW     Standing Unsupported with Eyes Closed  4  -MW     Standing Unsupported with Feet Together  4  -MW     Reaching Forward with Outstretched Arm While Standing  4  -MW      Object From the Floor From a Standing Position  4  -MW     Turning to Look Behind Over Left and Right Shoulders While Standing  4  -MW     Turn 360 Degrees  4  -MW     Place Alternate Foot on Step or Stool While Standing Unsupported  2  -MW     Standing Unsupported with One Foot in Front  2  -MW     Standing on One Leg  1  -MW     Loyd Total Score  49  -MW     Purdue Pegboard    Left Hand (30 seconds) 11  -EM      Right Hand (30 seconds) 12  -EM      Both Hands (30 seconds) 8  -EM      Right + Left + Both Hands 31  -EM      Assembly (60 seconds) 24  -EM      Timed Up and Go (TUG)    TUG Test 1  9.39 seconds  -MW     Other Outcome Measure Tool Used    Other Outcome Measure Tool Comments Modified Falls Efficacy Scale - 6.79; MFIS - 24/84  -EM      Functional Assessment    Outcome Measure Options  25 Foot Walk;Loyd Balance;Timed Up and Go (TUG)  -MW       User Key  (r) = Recorded By, (t) = Taken By, (c) = Cosigned By    Initials Name Provider Type    EM Adriana Fuller OTR Occupational Therapist    HANANE Lopez, PT Physical Therapist            Time Calculation:   OT Start Time: 1400  Total Timed Code Minutes- OT:  60 minute(s)     Therapy Charges for Today     Code Description Service Date Service Provider Modifiers Qty    32386143444  OT THER PROC EA 15 MIN 9/19/2017 Adriana Fuller OTR GO 2    [id number]  OT THERAPEUTIC ACT EA 15 MIN 9/19/2017 EZRA York GO 2                     Adriana Fuller, OTR  9/19/2017

## 2017-09-21 ENCOUNTER — APPOINTMENT (OUTPATIENT)
Dept: PHYSICAL THERAPY | Facility: HOSPITAL | Age: 42
End: 2017-09-21

## 2017-09-26 ENCOUNTER — HOSPITAL ENCOUNTER (OUTPATIENT)
Dept: OCCUPATIONAL THERAPY | Facility: HOSPITAL | Age: 42
Setting detail: THERAPIES SERIES
Discharge: HOME | End: 2017-09-26

## 2017-09-26 ENCOUNTER — HOSPITAL ENCOUNTER (OUTPATIENT)
Dept: SPEECH THERAPY | Facility: HOSPITAL | Age: 42
Setting detail: THERAPIES SERIES
Discharge: HOME | End: 2017-09-26

## 2017-09-26 ENCOUNTER — APPOINTMENT (OUTPATIENT)
Dept: SPEECH THERAPY | Facility: HOSPITAL | Age: 42
End: 2017-09-26

## 2017-09-26 ENCOUNTER — HOSPITAL ENCOUNTER (OUTPATIENT)
Dept: PHYSICAL THERAPY | Facility: HOSPITAL | Age: 42
Setting detail: THERAPIES SERIES
Discharge: HOME | End: 2017-09-26

## 2017-09-26 DIAGNOSIS — R53.1 DECREASED STRENGTH, ENDURANCE, AND MOBILITY: ICD-10-CM

## 2017-09-26 DIAGNOSIS — R29.898 LEFT LEG WEAKNESS: ICD-10-CM

## 2017-09-26 DIAGNOSIS — R27.8 DECREASED COORDINATION: ICD-10-CM

## 2017-09-26 DIAGNOSIS — R26.9 GAIT ABNORMALITY: ICD-10-CM

## 2017-09-26 DIAGNOSIS — Z74.09 DECREASED STRENGTH, ENDURANCE, AND MOBILITY: ICD-10-CM

## 2017-09-26 DIAGNOSIS — Z78.9 DECREASED INDEPENDENCE WITH ACTIVITIES OF DAILY LIVING: Primary | ICD-10-CM

## 2017-09-26 DIAGNOSIS — R68.89 DECREASED STRENGTH, ENDURANCE, AND MOBILITY: ICD-10-CM

## 2017-09-26 DIAGNOSIS — R26.89 BALANCE PROBLEM: Primary | ICD-10-CM

## 2017-09-26 DIAGNOSIS — M25.552 LEFT HIP PAIN: ICD-10-CM

## 2017-09-26 DIAGNOSIS — G35 MS (MULTIPLE SCLEROSIS) (HCC): Primary | ICD-10-CM

## 2017-09-26 PROCEDURE — 97110 THERAPEUTIC EXERCISES: CPT | Performed by: PHYSICAL THERAPIST

## 2017-09-26 PROCEDURE — 97530 THERAPEUTIC ACTIVITIES: CPT | Performed by: OCCUPATIONAL THERAPIST

## 2017-09-26 PROCEDURE — 92507 TX SP LANG VOICE COMM INDIV: CPT

## 2017-09-26 PROCEDURE — 97110 THERAPEUTIC EXERCISES: CPT | Performed by: OCCUPATIONAL THERAPIST

## 2017-09-26 PROCEDURE — 97112 NEUROMUSCULAR REEDUCATION: CPT | Performed by: PHYSICAL THERAPIST

## 2017-09-26 NOTE — THERAPY TREATMENT NOTE
"    Outpatient Physical Therapy Neuro Treatment Note  [facility]     Patient Name: Yifan Bowman  : 1975  MRN: [id number]  Today's Date: 2017      Visit Date: 2017    Visit Dx:    ICD-10-CM ICD-9-CM   1. Balance problem R26.89 781.99   2. Gait abnormality R26.9 781.2   3. Left hip pain M25.552 719.45   4. Left leg weakness M62.81 729.89       Patient Active Problem List   Diagnosis   • MS (multiple sclerosis)   • Left hip pain                 PT Neuro       17 1300          Subjective Comments    Subjective Comments \"I think my hair is thinning.\"  -MW      Subjective Pain    Able to rate subjective pain? yes  -MW      Pre-Treatment Pain Level 0  -MW      Post-Treatment Pain Level 0  -MW      Balance Skills Training    Training Strategies (Balance Skills) Standing balance tandem and sideways on half foam roll with holding with mini-squat x 10 without UE A with min/mod A with emphasis on not locking knees wieht B LE extension.  Resisted fwd walking tapping cone 20ft x 10 with mod A with LOB 40% of the time x 5 sets.  -        User Key  (r) = Recorded By, (t) = Taken By, (c) = Cosigned By    Initials Name Provider Type    HANANE Lopez, PT Physical Therapist                        PT Assessment/Plan       17 1300       PT Assessment    Assessment Comments Pt. demonstrates LOB up to 60% of the time with standing balance and emphasis on performing all activities withotu snapping B LE's into genu recurvatum.  Pt. demonstrates increased genu recurvatum B with fatigue.  Pt. to benefit from continued therapy serivces to meet remaining goals.  -     PT Plan    PT Plan Comments Continue with PT services to improve gait, balance, strength, transfers and functional mobility.  -       User Key  (r) = Recorded By, (t) = Taken By, (c) = Cosigned By    Initials Name Provider Type    HANANE Lopez, PT Physical Therapist                     Exercises       17 1300          " "Subjective Comments    Subjective Comments \"I think my hair is thinning.\"  -MW      Subjective Pain    Able to rate subjective pain? yes  -MW      Pre-Treatment Pain Level 0  -MW      Post-Treatment Pain Level 0  -MW      Exercise 1    Exercise Name 1 SciFit L6  -MW      Time (Minutes) 1 10   5 min fwd/5 min bwd  -MW        User Key  (r) = Recorded By, (t) = Taken By, (c) = Cosigned By    Initials Name Provider Type    HANANE Lopez PT Physical Therapist                                  Therapy Education       09/26/17 7188          Therapy Education    Given Posture/body mechanics;Mobility training  -MW      Program Reinforced  -MW      How Provided Verbal  -MW      Provided to Patient  -MW      Level of Understanding Verbalized  -MW        User Key  (r) = Recorded By, (t) = Taken By, (c) = Cosigned By    Initials Name Provider Type    HANANE Lopez PT Physical Therapist                Time Calculation:   Start Time: 1300  Total Timed Code Minutes- PT: 55 minute(s)     Therapy Charges for Today     Code Description Service Date Service Provider Modifiers Qty    86380309248  PT NEUROMUSC RE EDUCATION EA 15 MIN 9/26/2017 Meghan Lopez, PT GP 3    [id number]  PT THER PROC EA 15 MIN 9/26/2017 Meghan Lopez PT GP 1                    Meghan Lopez PT  9/26/2017       "

## 2017-09-26 NOTE — THERAPY TREATMENT NOTE
Outpatient Speech Language Pathology   Adult Speech Language Cognitive Treatment Note   [facility]     Patient Name: Yifan Bowman  : 1975  MRN: [id number]  Today's Date: 2017         Visit Date: 2017   Patient Active Problem List   Diagnosis   • MS (multiple sclerosis)   • Left hip pain          Visit Dx:    ICD-10-CM ICD-9-CM   1. MS (multiple sclerosis) G35 340                               SLP OP Goals       17 1515       Goal Type Needed    Goal Type Needed Memory;Other Adult Goals  -HG     Subjective Comments    Subjective Comments Pt alert, cooperative, pt is functioning well at work.   -HG     Memory Goals    Patient will be able to remember  information needed to participate in activities of daily living with 90% accuracy.  -HG     Status: Patient will be able to remember  information needed to participate in activities of daily living Progressing as expected  -HG     Comments: Patient will be able to remember  information needed to participate in activities of daily living 17: Pt is fxning by using a home calendar and currently not using cell phone for appts.   -HG     Patient will demonstrate improved ability to recall information by immediately recalling a series of words 80%:;related;after delay;with cues  -HG     Status: Patient will demonstrate improved ability to recall information by immediately recalling a series of words Progressing as expected  -HG     Comments: Patient will demonstrate improved ability to recall information by immediately recalling a series of words 17: 6 un-related picture recall, pt was 6/6, progressed to 7 pictures, T1: 7/7 with hesitation on 2/7, T2: 7/7, T3: 7/7. Progressed to 8 items and was 6/8.     17:6 un-related pictures from previous session and pt was 5/6, 6/6, 6/6 17: Visual recall for 4 un-related pictures and pt was T1: 4/4, T2: 4/4, Increased level of difficulty to 5 pictures and pt was 5/5 with delay. 6 pictures, pt  was 4/6.      -HG     Patient’s memory skills will be enhanced as reported by patient by utilizing internal memory strategies to recall up to 3 pieces of information after a 5- minute delay 80%:;with cues  -HG     Status: Patient’s memory skills will be enhanced as reported by patient by utilizing internal memory strategies to recall up to 3 pieces of information after a 5- minute delay Progressing as expected  -HG     Comments: Patient’s memory skills will be enhanced as reported by patient by utilizing internal memory strategies to recall up to 3 pieces of information after a 5- minute delay 9/26/17: Three un-related words after a time delay, pt was T1:3/3 x 2, fourth word added and pt was: 4/4 x 3.  -HG     Patient will demonstrate improved ability to recall information by listening to paragraph and answering yes/no questions 80%:;without cues  -HG     Status: Patient will demonstrate improved ability to recall information by listening to paragraph and answering yes/no questions Progressing as expected  -HG     Comments: Patient will demonstrate improved ability to recall information by listening to paragraph and answering yes/no questions 9/26/17: Short paragraphs, pt was 90% accurate. 9/18/17: 22-36 word paragraphs: T1: 3/3, T2: 2/3 T3: 3/3.  36-50 words: T1: 4/4, T2: 4/4, 50-65 Words: 5/6, 9/12/17:  short paragraph recall: pt was 3/3.   -HG     Other Goals    Other Adult Goal- 1 Pt will complete visuospatial tasks with 80% accuracy.  -HG     Status: Other Adult Goal- 1 Progressing as expected  -HG     Comments: Other Adult Goal- 1 9/26/17: Visuospatial constructs in session, pt was 70% accurate.  9/18/17: Visual recall for picture scene: pt was 80% accurate. 9/12/17:  For basic visuospatial tasks, pt was 70% accurate.   -HG     Other Adult Goal- 2 Pt will complete thought organization tasks with 80% accuracy.   -HG     Status: Other Adult Goal- 2 Progressing as expected  -HG     Comments: Other Adult Goal- 2  9/26/17: Word deductions: pt was 100% accurate. 9/18/17: Scrambled words: pt was 50% accurate independently. 9/12/17:  abstract category naming: 10/10,  8/10,   -     SLP Time Calculation    SLP Goal Re-Cert Due Date 10/08/17  -       User Key  (r) = Recorded By, (t) = Taken By, (c) = Cosigned By    Initials Name Provider Type    KIANA Peterson MS CCC-SLP Speech and Language Pathologist                OP SLP Education       09/26/17 1515    Education    Education Comments Use of memory strategies at home and carryover to work as well.   -      User Key  (r) = Recorded By, (t) = Taken By, (c) = Cosigned By    Initials Name Effective Dates    KIANA Peterson MS CCC-SLP 06/22/15 -                 OP SLP Assessment/Plan - 09/26/17 1515     SLP Plan    Plan Comments Cont with cog tx.   -      User Key  (r) = Recorded By, (t) = Taken By, (c) = Cosigned By    Initials Name Provider Type    KIANA Peterson MS CCC-SLP Speech and Language Pathologist                 Time Calculation:   SLP Start Time: 1515    Therapy Charges for Today     Code Description Service Date Service Provider Modifiers Qty    47702500387 HC ST TREATMENT SPEECH 5 9/26/2017 Birgit Peterson MS CCC-SLP GN 1                   Birgit Peterson MS CCC-SLP  9/26/2017

## 2017-09-26 NOTE — THERAPY TREATMENT NOTE
Outpatient Occupational Therapy Neuro Treatment Note  [facility]     Patient Name: Yifan Bowman  : 1975  MRN: [id number]  Today's Date: 2017       Visit Date: 2017    Patient Active Problem List   Diagnosis   • MS (multiple sclerosis)   • Left hip pain        Past Medical History:   Diagnosis Date   • MS (multiple sclerosis) 3/23/2017        Past Surgical History:   Procedure Laterality Date   •  SECTION     • REDUCTION MAMMAPLASTY           Visit Dx:    ICD-10-CM ICD-9-CM   1. Decreased independence with activities of daily living Z65.8 V62.89   2. Decreased strength, endurance, and mobility Z74.09 780.99   3. Decreased coordination R27.9 781.3                         OT Assessment/Plan       17 1518       OT Assessment    Assessment Comments Good participation throughout with continued LOB during dynamic standing activities;however no physical assist to correct. Energy continues to be limited.   -EM     OT Plan    OT Plan Comments Continue per POC  -EM       User Key  (r) = Recorded By, (t) = Taken By, (c) = Cosigned By    Initials Name Provider Type    EM Adirana Fuller OTR Occupational Therapist                    Therapy Education       17 1518 17 1359       Therapy Education    Given Posture/body mechanics;Symptoms/condition management  -EM Posture/body mechanics;Mobility training  -MW     Program Reinforced  -EM Reinforced  -MW     How Provided Verbal  -EM Verbal  -MW     Provided to Patient  -EM Patient  -MW     Level of Understanding Verbalized  -EM Verbalized  -MW       User Key  (r) = Recorded By, (t) = Taken By, (c) = Cosigned By    Initials Name Provider Type    REID Fuller OTR Occupational Therapist    HANANE Lopez, PT Physical Therapist                    OT Exercises       17 1400          Subjective Comments    Subjective Comments Pt reports she has to pace herself  -EM      Subjective Pain    Able to rate subjective pain? yes  -EM       Pre-Treatment Pain Level 0  -EM      Post-Treatment Pain Level 0  -EM      Exercise 1    Exercise Name 1 Energy and strength using nu-step  -EM      Time (Minutes) 1 10 mins at level 6  -EM      Exercise 2    Exercise Name 2 Pt completed dynamic standing balance, reaching down to floor, squatting, and standing on uneven surfaces while incorporating B UEs into tasks with SBA throughout. Increased time and concentration required.  -EM      Exercise 3    Exercise Name 3 Completed forward pass while sitting EOM focusing on increasing UB strength and overall energy.  -EM      Sets 3 3  -EM      Reps 3 20  -EM      Exercise 4    Exercise Name 4 Sat on therapy ball with CGA - SBA while completing chest press, overhead press, bicep curls   -EM      Equipment 4 Dumbell  -EM      Weights/Plates 4 3  -EM      Sets 4 3  -EM      Reps 4 10  -EM        User Key  (r) = Recorded By, (t) = Taken By, (c) = Cosigned By    Initials Name Provider Type    EM EZRA York Occupational Therapist                    Time Calculation:   OT Start Time: 1400  Total Timed Code Minutes- OT: 53 minute(s)     Therapy Charges for Today     Code Description Service Date Service Provider Modifiers Qty    [id number]  OT THERAPEUTIC ACT EA 15 MIN 9/26/2017 EZRA York GO 2    [id number]  OT THER PROC EA 15 MIN 9/26/2017 EZRA York GO 2                    EZRA Gaitan  9/26/2017

## 2017-09-28 ENCOUNTER — APPOINTMENT (OUTPATIENT)
Dept: PHYSICAL THERAPY | Facility: HOSPITAL | Age: 42
End: 2017-09-28

## 2017-10-03 ENCOUNTER — HOSPITAL ENCOUNTER (OUTPATIENT)
Dept: PHYSICAL THERAPY | Facility: HOSPITAL | Age: 42
Setting detail: THERAPIES SERIES
Discharge: HOME | End: 2017-10-03

## 2017-10-03 ENCOUNTER — HOSPITAL ENCOUNTER (OUTPATIENT)
Dept: OCCUPATIONAL THERAPY | Facility: HOSPITAL | Age: 42
Setting detail: THERAPIES SERIES
Discharge: HOME | End: 2017-10-03

## 2017-10-03 DIAGNOSIS — Z78.9 DECREASED INDEPENDENCE WITH ACTIVITIES OF DAILY LIVING: ICD-10-CM

## 2017-10-03 DIAGNOSIS — R26.9 GAIT ABNORMALITY: ICD-10-CM

## 2017-10-03 DIAGNOSIS — R26.89 BALANCE PROBLEM: Primary | ICD-10-CM

## 2017-10-03 DIAGNOSIS — R68.89 DECREASED STRENGTH, ENDURANCE, AND MOBILITY: Primary | ICD-10-CM

## 2017-10-03 DIAGNOSIS — Z74.09 DECREASED STRENGTH, ENDURANCE, AND MOBILITY: Primary | ICD-10-CM

## 2017-10-03 DIAGNOSIS — R29.898 LEFT LEG WEAKNESS: ICD-10-CM

## 2017-10-03 DIAGNOSIS — R27.8 DECREASED COORDINATION: ICD-10-CM

## 2017-10-03 DIAGNOSIS — M25.552 LEFT HIP PAIN: ICD-10-CM

## 2017-10-03 DIAGNOSIS — R53.1 DECREASED STRENGTH, ENDURANCE, AND MOBILITY: Primary | ICD-10-CM

## 2017-10-03 PROCEDURE — 97112 NEUROMUSCULAR REEDUCATION: CPT | Performed by: PHYSICAL THERAPIST

## 2017-10-03 PROCEDURE — 97110 THERAPEUTIC EXERCISES: CPT | Performed by: PHYSICAL THERAPIST

## 2017-10-03 PROCEDURE — 97530 THERAPEUTIC ACTIVITIES: CPT | Performed by: OCCUPATIONAL THERAPIST

## 2017-10-03 PROCEDURE — 97110 THERAPEUTIC EXERCISES: CPT | Performed by: OCCUPATIONAL THERAPIST

## 2017-10-03 NOTE — THERAPY TREATMENT NOTE
Outpatient Occupational Therapy Neuro Treatment Note  [facility]     Patient Name: Yifan Bowman  : 1975  MRN: [id number]  Today's Date: 10/3/2017       Visit Date: 10/03/2017    Patient Active Problem List   Diagnosis   • MS (multiple sclerosis)   • Left hip pain        Past Medical History:   Diagnosis Date   • MS (multiple sclerosis) 3/23/2017        Past Surgical History:   Procedure Laterality Date   •  SECTION     • REDUCTION MAMMAPLASTY           Visit Dx:    ICD-10-CM ICD-9-CM   1. Decreased strength, endurance, and mobility R53.1 780.79    Z74.09 780.99   2. Decreased independence with activities of daily living Z65.8 V62.89   3. Decreased coordination R27.9 781.3                         OT Assessment/Plan       10/03/17 1606       OT Assessment    Assessment Comments Good participation throughout.  Pt was close SBA - CGA for balance following PT session and demo slightly increased fatigue compared to last week; however improving overall.  -EM     OT Plan    OT Plan Comments continue per POc  -EM       User Key  (r) = Recorded By, (t) = Taken By, (c) = Cosigned By    Initials Name Provider Type    EM Adriana Fuller OTR Occupational Therapist                    Therapy Education       10/03/17 1606 10/03/17 1458       Therapy Education    Education Details  HEP  -MW     Given Posture/body mechanics  -EM HEP  -MW     Program Reinforced  -EM New  -MW     How Provided Verbal  -EM Verbal  -MW     Provided to Patient  -EM Caregiver  -MW     Level of Understanding Verbalized;Demonstrated  -EM Verbalized  -MW       User Key  (r) = Recorded By, (t) = Taken By, (c) = Cosigned By    Initials Name Provider Type    REID Fuller OTR Occupational Therapist    HANANE Lopez, PT Physical Therapist                    OT Exercises       10/03/17 1400          Subjective Comments    Subjective Comments Pt reports feeling a litte tired since PT  -EM      Subjective Pain    Able to rate subjective  pain? yes  -EM      Pre-Treatment Pain Level 0  -EM      Post-Treatment Pain Level 0  -EM      Exercise 1    Exercise Name 1 Energy and strength  -EM      Equipment 1 UE Ergometer  -EM      Time (Minutes) 1 6 mins each way at levle 7  -EM      Exercise 2    Exercise Name 2 Pt completed TRX exercises squatting while holding straps with SBA  -EM      Sets 2 3  -EM      Reps 2 10  -EM      Exercise 3    Exercise Name 3 Completed forward pass while sitting EOM focusing on increasing UB strength and overall energy.  -EM      Sets 3 3  -EM      Reps 3 20  -EM      Exercise 4    Exercise Name 4 Sat on therapy ball with CGA - SBA while completing chest press, overhead press, bicep curls   -EM      Equipment 4 Dumbell  -EM      Weights/Plates 4 3  -EM      Sets 4 3  -EM      Reps 4 10  -EM      Exercise 5    Exercise Name 5 UB strength; rows, shoulder extension and horizontal abduction  -EM      Equipment 5 Theraband  -EM      Resistance 5 Red  -EM      Sets 5 3  -EM      Reps 5 10  -EM      Exercise 6    Exercise Name 6 Theraputty activity to increase hand strength and coordination using firm putty, small beans and focusing on lateral pinch, tip pinch, manipulation and translation.  -EM        User Key  (r) = Recorded By, (t) = Taken By, (c) = Cosigned By    Initials Name Provider Type    EZRA Garcias Occupational Therapist                    Time Calculation:   OT Start Time: 1500  Total Timed Code Minutes- OT: 55 minute(s)     Therapy Charges for Today     Code Description Service Date Service Provider Modifiers Qty    [id number]  OT THERAPEUTIC ACT EA 15 MIN 10/3/2017 EZRA York GO 2    [id number]  OT THER PROC EA 15 MIN 10/3/2017 EZRA York GO 2                    EZRA Gaitan  10/3/2017

## 2017-10-03 NOTE — THERAPY TREATMENT NOTE
"    Outpatient Physical Therapy Neuro Treatment Note  [facility]     Patient Name: Yifan Bowman  : 1975  MRN: [id number]  Today's Date: 10/3/2017      Visit Date: 10/03/2017    Visit Dx:    ICD-10-CM ICD-9-CM   1. Balance problem R26.89 781.99   2. Gait abnormality R26.9 781.2   3. Left hip pain M25.552 719.45   4. Left leg weakness R29.898 729.89       Patient Active Problem List   Diagnosis   • MS (multiple sclerosis)   • Left hip pain                 PT Neuro       10/03/17 1400          Subjective Comments    Subjective Comments \"I think my balance is getting better.\"  -MW      Subjective Pain    Able to rate subjective pain? yes  -MW      Pre-Treatment Pain Level 0  -MW      Post-Treatment Pain Level 0  -MW      Balance Skills Training    Training Strategies (Balance Skills) Standing balance with foot on ball and holding balance with emphasis on keeping B knees unlocked into extension x  5 and then hold SLS with other foot on slider and performing B circles both clockwise and counter clockwise x 10 with min A with emphasis on keeping knees from locking out.  -        User Key  (r) = Recorded By, (t) = Taken By, (c) = Cosigned By    Initials Name Provider Type    HANANE Lopez, PT Physical Therapist                        PT Assessment/Plan       10/03/17 1400       PT Assessment    Assessment Comments Pt. requires min A with standing balance with patient having difficulty with keeping knees unlocked into extension in standing.  Pt. demonstrates B LE fatigue with all fine control activity.  Pt. to benefit from continued therapy services.  -     PT Plan    PT Plan Comments Continue with PT services to improve gait, balance, strength, transfers and functional mobility.  -       User Key  (r) = Recorded By, (t) = Taken By, (c) = Cosigned By    Initials Name Provider Type    HANANE Lopez, PT Physical Therapist                     Exercises       10/03/17 1400          Subjective " "Comments    Subjective Comments \"I think my balance is getting better.\"  -MW      Subjective Pain    Able to rate subjective pain? yes  -MW      Pre-Treatment Pain Level 0  -MW      Post-Treatment Pain Level 0  -MW      Exercise 1    Exercise Name 1 NuStep L6  -MW      Time (Minutes) 1 10   B UE/LEs  -MW      Exercise 2    Exercise Name 2 Issued and performed HEP, see for details with B heel slides, B SLR, bridging  -MW      Sets 2 2  -MW      Reps 2 10  -MW      Exercise 3    Exercise Name 3 DLP  -MW      Sets 3 1  -MW      Time (Minutes) 3 2  -MW      Exercise 4    Exercise Name 4 Supine B hip abduction with toes pointed up towards ceiling  -MW      Sets 4 1  -MW      Reps 4 10  -MW      Additional Comments min A  -MW        User Key  (r) = Recorded By, (t) = Taken By, (c) = Cosigned By    Initials Name Provider Type    HANANE Lopez PT Physical Therapist                                  Therapy Education       10/03/17 1452          Therapy Education    Education Details HEP  -MW      Given HEP  -MW      Program New  -MW      How Provided Verbal  -MW      Provided to Caregiver  -MW      Level of Understanding Verbalized  -MW        User Key  (r) = Recorded By, (t) = Taken By, (c) = Cosigned By    Initials Name Provider Type    HANANE Lopez PT Physical Therapist                Time Calculation:   Start Time: 1400  Total Timed Code Minutes- PT: 54 minute(s)     Therapy Charges for Today     Code Description Service Date Service Provider Modifiers Qty    11581971379  PT NEUROMUSC RE EDUCATION EA 15 MIN 10/3/2017 Meghan Lopez, PT GP 1    [id number]  PT THER PROC EA 15 MIN 10/3/2017 Meghan Lopez, PT GP 3                    Meghan Lopez, PT  10/3/2017       "

## 2017-10-04 ENCOUNTER — APPOINTMENT (OUTPATIENT)
Dept: PHYSICAL THERAPY | Facility: HOSPITAL | Age: 42
End: 2017-10-04

## 2017-10-10 ENCOUNTER — APPOINTMENT (OUTPATIENT)
Dept: OCCUPATIONAL THERAPY | Facility: HOSPITAL | Age: 42
End: 2017-10-10

## 2017-10-10 ENCOUNTER — APPOINTMENT (OUTPATIENT)
Dept: PHYSICAL THERAPY | Facility: HOSPITAL | Age: 42
End: 2017-10-10

## 2017-10-17 ENCOUNTER — HOSPITAL ENCOUNTER (OUTPATIENT)
Dept: OCCUPATIONAL THERAPY | Facility: HOSPITAL | Age: 42
Setting detail: THERAPIES SERIES
Discharge: HOME | End: 2017-10-17

## 2017-10-17 ENCOUNTER — HOSPITAL ENCOUNTER (OUTPATIENT)
Dept: PHYSICAL THERAPY | Facility: HOSPITAL | Age: 42
Setting detail: THERAPIES SERIES
Discharge: HOME | End: 2017-10-17

## 2017-10-17 DIAGNOSIS — R27.8 DECREASED COORDINATION: ICD-10-CM

## 2017-10-17 DIAGNOSIS — R53.1 DECREASED STRENGTH, ENDURANCE, AND MOBILITY: Primary | ICD-10-CM

## 2017-10-17 DIAGNOSIS — R29.898 LEFT LEG WEAKNESS: ICD-10-CM

## 2017-10-17 DIAGNOSIS — R26.89 BALANCE PROBLEM: Primary | ICD-10-CM

## 2017-10-17 DIAGNOSIS — R68.89 DECREASED STRENGTH, ENDURANCE, AND MOBILITY: Primary | ICD-10-CM

## 2017-10-17 DIAGNOSIS — Z78.9 DECREASED INDEPENDENCE WITH ACTIVITIES OF DAILY LIVING: ICD-10-CM

## 2017-10-17 DIAGNOSIS — M25.552 LEFT HIP PAIN: ICD-10-CM

## 2017-10-17 DIAGNOSIS — R26.9 GAIT ABNORMALITY: ICD-10-CM

## 2017-10-17 DIAGNOSIS — Z74.09 DECREASED STRENGTH, ENDURANCE, AND MOBILITY: Primary | ICD-10-CM

## 2017-10-17 PROCEDURE — 97112 NEUROMUSCULAR REEDUCATION: CPT | Performed by: PHYSICAL THERAPIST

## 2017-10-17 PROCEDURE — 97150 GROUP THERAPEUTIC PROCEDURES: CPT | Performed by: OCCUPATIONAL THERAPIST

## 2017-10-17 PROCEDURE — 97110 THERAPEUTIC EXERCISES: CPT | Performed by: PHYSICAL THERAPIST

## 2017-10-17 PROCEDURE — 97112 NEUROMUSCULAR REEDUCATION: CPT | Performed by: OCCUPATIONAL THERAPIST

## 2017-10-17 NOTE — THERAPY PROGRESS REPORT/RE-CERT
"    Outpatient Physical Therapy Neuro Re-Assessment  [facility]     Patient Name: Yifan Bowmna  : 1975  MRN: [id number]  Today's Date: 10/17/2017      Visit Date: 10/17/2017    Patient Active Problem List   Diagnosis   • MS (multiple sclerosis)   • Left hip pain        Past Medical History:   Diagnosis Date   • MS (multiple sclerosis) 3/23/2017        Past Surgical History:   Procedure Laterality Date   •  SECTION     • REDUCTION MAMMAPLASTY           Visit Dx:     ICD-10-CM ICD-9-CM   1. Balance problem R26.89 781.99   2. Left hip pain M25.552 719.45   3. Gait abnormality R26.9 781.2   4. Left leg weakness R29.898 729.89                     PT Neuro       10/17/17 1400          Subjective Comments    Subjective Comments \"I think this brace maybe not helping.  I think it made me loose my balance and almost fall so I took it off at work.\"  -MW      Subjective Pain    Able to rate subjective pain? yes  -MW      Pre-Treatment Pain Level 0  -MW      Post-Treatment Pain Level 0  -MW      Balance Skills Training    Training Strategies (Balance Skills) Re-assessment completed, see for details.  Standing balance with alternating fwd lunging towards BOSU x 10 with min A and then hold lunge with pressing 2# bar overhead x 10 with min A.  Standing on both sides of BOSU with pressing 2# bar overhead x 10 with emphasis on keeping B knees flexed with min/mod A for balance.    -MW        User Key  (r) = Recorded By, (t) = Taken By, (c) = Cosigned By    Initials Name Provider Type    MW Meghan Lopez, PT Physical Therapist                        Therapy Education       10/17/17 6959          Therapy Education    Education Details pt educated to call [facility] to speak with them about the brace and how it doesn't feel right. pt agreed  -MW      Given Other (comment)   AFO education  -MW      Program Reinforced  -MW      How Provided Verbal  -MW      Provided to Patient  -MW      Level of Understanding " Verbalized  -MW        User Key  (r) = Recorded By, (t) = Taken By, (c) = Cosigned By    Initials Name Provider Type    HANANE Lopez, PT Physical Therapist                PT OP Goals       10/17/17 1400       PT Short Term Goals    STG Date to Achieve 10/04/17  -MW     STG 1 Patient to improve STEINER balance score to >/= 49/56 to decrease client's risk of falls.  -MW     STG 1 Progress Met  -MW     STG 2 Patient to perform TUG within 12 sec without LOB for improved functional mobility.  -MW     STG 2 Progress Met  -MW     STG 3 Patient to improve FGA score to >/= 15/30 to decrease client's risk of falls.  -MW     STG 3 Progress Met  -MW     STG 4 Pt. to improve DGI score to >/=  15/24 to decrease pts risk of falls.  -MW     STG 4 Progress Met  -MW     Long Term Goals    LTG Date to Achieve 11/15/17  -MW     LTG 1 Patient to improve STEINER balance score to >/= 54/56 to decrease client's risk of falls.  -MW     LTG 1 Progress Ongoing  -MW     LTG 2 Patient to perform TUG within 10.5 sec without LOB for improved functional mobility.  -MW     LTG 2 Progress Met  -MW     LTG 3 Patient to improve FGA score to >/= 22/30 to decrease client's risk of falls.  -MW     LTG 3 Progress Ongoing  -MW     LTG 4 Pt. to improve DGI score to >/=  21/24 to decrease pts risk of falls.  -MW     LTG 4 Progress Ongoing  -MW     LTG 5 Patient to ambulate 25 feet without AD within 8.5 sec without LOB at a regular pace for improved gait jena  -MW     LTG 5 Progress Met  -MW     LTG 6 Patient to be I with HEP  -MW     LTG 6 Progress Ongoing  -MW     Time Calculation    PT Goal Re-Cert Due Date 12/17/17  -MW       User Key  (r) = Recorded By, (t) = Taken By, (c) = Cosigned By    Initials Name Provider Type    HANANE Lopez, PT Physical Therapist                PT Assessment/Plan       10/17/17 1400       PT Assessment    Functional Limitations Decreased safety during functional activities;Impaired gait;Impaired locomotion;Limitation  "in home management;Limitations in community activities;Performance in leisure activities  -MW     Impairments Balance;Coordination;Endurance;Gait;Impaired flexibility;Locomotion;Muscle strength;Pain;Sensation;Range of motion  -MW     Assessment Comments Pt. met all STG's today and LTG for TUG and 25 ft walk.  Pt. is progressing well towards remaining goals.  Pt. requires min/mod A with standing dynamic balance on uneven surfaces.  Pt. to benefit from continued therapy services through the time pt gets a new AFO.  Pt. needs to call [facility] to ensure that current brace is going to work for her long term.  -MW     Please refer to paper survey for additional self-reported information Yes  -MW     Rehab Potential Good  -MW     Patient/caregiver participated in establishment of treatment plan and goals Yes  -MW     Patient would benefit from skilled therapy intervention Yes  -MW     PT Plan    PT Frequency 1x/week  -MW     Predicted Duration of Therapy Intervention (days/wks) 4 weeks  -MW     Planned CPT's? PT THER PROC EA 15 MIN: 36263;PT GAIT TRAINING EA 15 MIN: 98428;PT NEUROMUSC RE-EDUCATION EA 15 MIN: 96516;PT THER ACT EA 15 MIN: 56183  -MW     PT Plan Comments Continue with PT services to improve gait, balance, strength adn functional mobility.  -MW       User Key  (r) = Recorded By, (t) = Taken By, (c) = Cosigned By    Initials Name Provider Type    HANANE Lopez, PT Physical Therapist                   Exercises       10/17/17 1400          Subjective Comments    Subjective Comments \"I think this brace maybe not helping.  I think it made me loose my balance and almost fall so I took it off at work.\"  -MW      Subjective Pain    Able to rate subjective pain? yes  -MW      Pre-Treatment Pain Level 0  -MW      Post-Treatment Pain Level 0  -MW      Exercise 1    Exercise Name 1 NuStep L6  -MW      Time (Minutes) 1 10   B UE/LEs  -MW        User Key  (r) = Recorded By, (t) = Taken By, (c) = Cosigned By "    Initials Name Provider Type    HANANE Lopez, PT Physical Therapist                            Outcome Measures       10/17/17 1400          Loyd Balance Scale    Sitting to Standing 4  -MW      Standing Unsupported 4  -MW      Sitting with Back Unsupported but Feet Supported on Floor or on Stool 4  -MW      Standing to Sitting 4  -MW      Transfers 4  -MW      Standing Unsupported with Eyes Closed 4  -MW      Standing Unsupported with Feet Together 4  -MW      Reaching Forward with Outstretched Arm While Standing 4  -MW       Object From the Floor From a Standing Position 4  -MW      Turning to Look Behind Over Left and Right Shoulders While Standing 4  -MW      Turn 360 Degrees 4  -MW      Place Alternate Foot on Step or Stool While Standing Unsupported 4  -MW      Standing Unsupported with One Foot in Front 3  -MW      Standing on One Leg 3  -MW      Loyd Total Score 54  -MW      Dynamic Gait Index (DGI)    Gait Level Surface 2  -MW      Change in Gait Speed 2  -MW      Gait with Horizontal Head Turns 2  -MW      Gait with Vertical Head Turns 2  -MW      Gait and Pivot Turn 2  -MW      Step Over Obstacle 1  -MW      Step Around Obstacles 2  -MW      Steps 2  -MW      Dynamic Gait Index Score 15  -MW      Functional Gait Assessment (FGA)    Gait Level Surface 2  -MW      Change in Gait Speed 2  -MW      Gait with Horizontal Head Turns 2  -MW      Gait with Vertical Head Turns 2  -MW      Gait and Pivot Turn 2  -MW      Step Over Obstacle 1  -MW      Gait with Narrow Base of Support 0  -MW      Gait with Eyes Closed 2  -MW      Ambulating Backwards 2  -MW      Steps 2  -MW      FGA Total Score 17  -MW      Functional Assessment    Outcome Measure Options Loyd Balance;Dynamic Gait Index;FGA (Functional Gait Assessment)  -MW        User Key  (r) = Recorded By, (t) = Taken By, (c) = Cosigned By    Initials Name Provider Type    HANANE Lopez, PT Physical Therapist          Time Calculation:    Start Time: 1400  Total Timed Code Minutes- PT: 45 minute(s)     Therapy Charges for Today     Code Description Service Date Service Provider Modifiers Qty    73535054657 HC PT NEUROMUSC RE EDUCATION EA 15 MIN 10/17/2017 Meghan Lopez, PT GP 2    90121494614 HC PT THER PROC EA 15 MIN 10/17/2017 Meghan Lopez, PT GP 1          PT G-Codes  Outcome Measure Options: Loyd Balance, Dynamic Gait Index, FGA (Functional Gait Assessment)         Meghan Lopez, PT  10/17/2017

## 2017-10-17 NOTE — THERAPY PROGRESS REPORT/RE-CERT
Outpatient Occupational Therapy Neuro Re-Assessment  [facility]     Patient Name: Yifan Bowman  : 1975  MRN: [id number]  Today's Date: 10/17/2017      Visit Date: 10/17/2017    Patient Active Problem List   Diagnosis   • MS (multiple sclerosis)   • Left hip pain        Past Medical History:   Diagnosis Date   • MS (multiple sclerosis) 3/23/2017        Past Surgical History:   Procedure Laterality Date   •  SECTION     • REDUCTION MAMMAPLASTY           Visit Dx:      ICD-10-CM ICD-9-CM   1. Decreased strength, endurance, and mobility R53.1 780.79    Z74.09 780.99   2. Decreased independence with activities of daily living Z65.8 V62.89   3. Decreased coordination R27.9 781.3                 OT Neuro       10/17/17 1445          Subjective Pain    Able to rate subjective pain? yes  -EM      Pre-Treatment Pain Level 0  -EM      Post-Treatment Pain Level 0  -EM      Left Shoulder    Flexion Gross Movement (4+/5) good plus  -EM      ABduction Gross Movement (4+/5) good plus  -EM      Right Shoulder    Flexion Gross Movement (4+/5) good plus  -EM      ABduction Gross Movement (4+/5) good plus  -EM      Left Elbow/Forearm    Elbow Flexion Gross Movement (5/5) normal  -EM      Elbow Extension Gross Movement (5/5) normal  -EM      Right Elbow/Forearm    Elbow Flexion Gross Movement (5/5) normal  -EM      Elbow Extension Gross Movement (5/5) normal  -EM      Left Wrist    Wrist Flexion Gross Movement (5/5) normal  -EM      Wrist Extension Gross Movement (5/5) normal  -EM      Right Wrist    Wrist Flexion Gross Movement (5/5) normal  -EM      Wrist Extension Gross Movement (5/5) normal  -EM      ADL Assessment/Intervention    IADL Assess/Train, Comment Pt reports she is getting more balanced and stronger overall.  -EM        User Key  (r) = Recorded By, (t) = Taken By, (c) = Cosigned By    Initials Name Provider Type    REID Fuller OTR Occupational Therapist             Hand Therapy (last 24 hours)       Hand Eval       10/17/17 1445           Strength Right    # Reps 3  -EM      Right Rung 2  -EM      Right  Test 1 45  -EM      Right  Test 2 30  -EM      Right  Test 3 32  -EM       Strength Average Right 35.67  -EM       Strength Left    # Reps 3  -EM      Left Rung 2  -EM      Left  Test 1 40  -EM      Left  Test 2 34  -EM      Left  Test 3 30  -EM       Strength Average Left 34.67  -EM      Right Hand Strength - Pinch (lbs)    Lateral 12 lbs  -EM      Left Hand Strength - Pinch (lbs)    Lateral 11 lbs  -EM        User Key  (r) = Recorded By, (t) = Taken By, (c) = Cosigned By    Initials Name Provider Type    EM Adriana Fuller, OTR Occupational Therapist                    Therapy Education       10/17/17 1549 10/17/17 1424       Therapy Education    Education Details digiflex exercises to further increase  strength  -EM pt educated to call [facility] to speak with them about the brace and how it doesn't feel right. pt agreed  -MW     Given  Other (comment)   AFO education  -MW     Program New  -EM Reinforced  -MW     How Provided Verbal;Demonstration  -EM Verbal  -MW     Provided to Patient  -EM Patient  -MW     Level of Understanding Verbalized;Demonstrated  -EM Verbalized  -MW       User Key  (r) = Recorded By, (t) = Taken By, (c) = Cosigned By    Initials Name Provider Type    REID Fuller, OTR Occupational Therapist    MW Meghan Lopez, PT Physical Therapist                OT Goals       10/17/17 1445       OT Short Term Goals    STG Date to Achieve 11/14/17  -EM     STG 1 Pt will be educated in UE HEP to increase overall strength and functional use of B UEs for ADL tasks  -EM     STG 1 Progress Met  -EM     STG 2 Pt will be educated in HEP to increase coordination and sensation in B hands to increase functional use  -EM     STG 2 Progress Met  -EM     STG 3 Pt will participate in dynamic standing activities with min A while incorporating B hands to  increase functional balance and mobility  -EM     STG 3 Progress Ongoing  -EM     Long Term Goals    LTG Date to Achieve 12/12/17  -EM     LTG 1 Pt will be independent with all HEPs  -EM     LTG 1 Progress Ongoing  -EM     LTG 2 Patient will participate in dynamic standing activities with SBA while incorporating B UEs to increase functional balance and mobility.   -EM     LTG 2 Progress Ongoing  -EM     LTG 3 Pt will score < 23 secs bilaterally on 9 HPT to indicate increased coordination for ADL takss  -EM     LTG 3 Progress Met  -EM     LTG 4 Pt will increase  strength to 40# bilaterally to increase functional tasks  -EM     LTG 4 Progress Ongoing  -EM       User Key  (r) = Recorded By, (t) = Taken By, (c) = Cosigned By    Initials Name Provider Type    EM Adriana Fuller OTR Occupational Therapist                OT Assessment/Plan       10/17/17 1535 10/17/17 1400    OT Assessment    Functional Limitations Decreased safety during functional activities;Limitation in home management;Limitations in community activities;Limitations in functional capacity and performance;Performance in leisure activities;Performance in self-care ADL;Performance in work activities  -EM     Impairments Balance;Coordination;Dexterity;Endurance;Muscle strength;Motor function;Sensation;Pain  -EM     Assessment Comments Increased coordination bilaterally. Limited change in bilateral /pinch strength, which is significantly lower than average for her age/gender. Increased dynamic balance and confidence with balance; however continues to require assist with high level dynamic balance   -EM     OT Plan    OT Frequency 1x/week  -EM     Predicted Duration of Therapy Intervention (days/wks) 4 total visits  -EM 4 weeks  -MW    OT Plan Comments Continue for the rest of the month to further educate and progress toward goals as specified.  -EM       User Key  (r) = Recorded By, (t) = Taken By, (c) = Cosigned By    Initials Name Provider Type     EM Adriana KIRK Fuller OTR Occupational Therapist    MW Meghan Lopez, PT Physical Therapist                OT Exercises       10/17/17 1445          Subjective Comments    Subjective Comments Pt reports she is feeling and doing better overall  -EM      Exercise 1    Exercise Name 1 Reassessment completed.  -EM      Exercise 2    Exercise Name 2 Pt completed  and pinch bilaterally   -EM      Equipment 2 Hand Gripper  -EM      Resistance 2 Blue;Green  -EM      Sets 2 4  -EM      Reps 2 10  -EM        User Key  (r) = Recorded By, (t) = Taken By, (c) = Cosigned By    Initials Name Provider Type    EM Adriana M BLAYNE FullerR Occupational Therapist                    Outcome Measures       10/17/17 1445 10/17/17 1400       9 Hole Peg    9-Hole Peg Left 22.25  -EM      9-Hole Peg Right 21.45  -EM      Loyd Balance Scale    Sitting to Standing  4  -MW     Standing Unsupported  4  -MW     Sitting with Back Unsupported but Feet Supported on Floor or on Stool  4  -MW     Standing to Sitting  4  -MW     Transfers  4  -MW     Standing Unsupported with Eyes Closed  4  -MW     Standing Unsupported with Feet Together  4  -MW     Reaching Forward with Outstretched Arm While Standing  4  -MW      Object From the Floor From a Standing Position  4  -MW     Turning to Look Behind Over Left and Right Shoulders While Standing  4  -MW     Turn 360 Degrees  4  -MW     Place Alternate Foot on Step or Stool While Standing Unsupported  4  -MW     Standing Unsupported with One Foot in Front  3  -MW     Standing on One Leg  3  -MW     Loyd Total Score  54  -MW     Dynamic Gait Index (DGI)    Gait Level Surface  2  -MW     Change in Gait Speed  2  -MW     Gait with Horizontal Head Turns  2  -MW     Gait with Vertical Head Turns  2  -MW     Gait and Pivot Turn  2  -MW     Step Over Obstacle  1  -MW     Step Around Obstacles  2  -MW     Steps  2  -MW     Dynamic Gait Index Score  15  -MW     Functional Gait Assessment (FGA)    Gait Level Surface   2  -MW     Change in Gait Speed  2  -MW     Gait with Horizontal Head Turns  2  -MW     Gait with Vertical Head Turns  2  -MW     Gait and Pivot Turn  2  -MW     Step Over Obstacle  1  -MW     Gait with Narrow Base of Support  0  -MW     Gait with Eyes Closed  2  -MW     Ambulating Backwards  2  -MW     Steps  2  -MW     FGA Total Score  17  -MW     Purdue Pegboard    Left Hand (30 seconds) 11  -EM      Right Hand (30 seconds) 13  -EM      Both Hands (30 seconds) 9  -EM      Right + Left + Both Hands 33  -EM      Assembly (60 seconds) 28  -EM      Other Outcome Measure Tool Used    Other Outcome Measure Tool Comments Modified Falls Efficacy SCale = 8.64; MFIS = 25/84  -EM      Functional Assessment    Outcome Measure Options  Loyd Balance;Dynamic Gait Index;FGA (Functional Gait Assessment)  -MW       User Key  (r) = Recorded By, (t) = Taken By, (c) = Cosigned By    Initials Name Provider Type    EM Adriana Fuller OTR Occupational Therapist     Meghan Lopez, PT Physical Therapist            Time Calculation:   OT Start Time: 1445  Total Timed Code Minutes- OT: 55 minute(s)     Therapy Charges for Today     Code Description Service Date Service Provider Modifiers Qty    29323345542  OT THER PROC GROUP 10/17/2017 EZRA York GO 2    [id number]  OT NEUROMUSC RE EDUCATION EA 15 MIN 10/17/2017 EZRA York GO 2                     EZRA Gaitan  10/17/2017

## 2017-10-24 ENCOUNTER — HOSPITAL ENCOUNTER (OUTPATIENT)
Dept: PHYSICAL THERAPY | Facility: HOSPITAL | Age: 42
Setting detail: THERAPIES SERIES
Discharge: HOME | End: 2017-10-24

## 2017-10-24 ENCOUNTER — HOSPITAL ENCOUNTER (OUTPATIENT)
Dept: OCCUPATIONAL THERAPY | Facility: HOSPITAL | Age: 42
Setting detail: THERAPIES SERIES
Discharge: HOME | End: 2017-10-24

## 2017-10-24 DIAGNOSIS — R68.89 DECREASED STRENGTH, ENDURANCE, AND MOBILITY: Primary | ICD-10-CM

## 2017-10-24 DIAGNOSIS — Z78.9 DECREASED INDEPENDENCE WITH ACTIVITIES OF DAILY LIVING: ICD-10-CM

## 2017-10-24 DIAGNOSIS — M25.552 LEFT HIP PAIN: ICD-10-CM

## 2017-10-24 DIAGNOSIS — R29.898 LEFT LEG WEAKNESS: ICD-10-CM

## 2017-10-24 DIAGNOSIS — Z74.09 DECREASED STRENGTH, ENDURANCE, AND MOBILITY: Primary | ICD-10-CM

## 2017-10-24 DIAGNOSIS — R27.8 DECREASED COORDINATION: ICD-10-CM

## 2017-10-24 DIAGNOSIS — R53.1 DECREASED STRENGTH, ENDURANCE, AND MOBILITY: Primary | ICD-10-CM

## 2017-10-24 DIAGNOSIS — R26.89 BALANCE PROBLEM: Primary | ICD-10-CM

## 2017-10-24 DIAGNOSIS — R26.9 GAIT ABNORMALITY: ICD-10-CM

## 2017-10-24 PROCEDURE — 97112 NEUROMUSCULAR REEDUCATION: CPT | Performed by: OCCUPATIONAL THERAPIST

## 2017-10-24 PROCEDURE — 97112 NEUROMUSCULAR REEDUCATION: CPT | Performed by: PHYSICAL THERAPIST

## 2017-10-24 PROCEDURE — 97110 THERAPEUTIC EXERCISES: CPT | Performed by: PHYSICAL THERAPIST

## 2017-10-24 PROCEDURE — 97110 THERAPEUTIC EXERCISES: CPT | Performed by: OCCUPATIONAL THERAPIST

## 2017-10-24 NOTE — THERAPY TREATMENT NOTE
"    Outpatient Physical Therapy Neuro Treatment Note  [facility]     Patient Name: Yifan Bowman  : 1975  MRN: [id number]  Today's Date: 10/24/2017      Visit Date: 10/24/2017    Visit Dx:    ICD-10-CM ICD-9-CM   1. Balance problem R26.89 781.99   2. Left hip pain M25.552 719.45   3. Gait abnormality R26.9 781.2   4. Left leg weakness R29.898 729.89       Patient Active Problem List   Diagnosis   • MS (multiple sclerosis)   • Left hip pain                 PT Neuro       10/24/17 1400          Subjective Comments    Subjective Comments \"I forgot to call about my brace.  I wore it at work yesterday and it made me feel off balance so I took it off after a few hours.\"  -MW      Subjective Pain    Able to rate subjective pain? yes  -MW      Pre-Treatment Pain Level 0  -MW      Post-Treatment Pain Level 0  -MW      Balance Skills Training    Training Strategies (Balance Skills) Standing balance on trampoline with B LE marching in place, marching with B hip ab/adduction x 10 without UE A with min A with VCing to keep B knees flexed.  360 degree turns B x 2 without UE A with min A.  Standing on trampoline with EC with reg DANELLE and B staggered up to 10 sec with CGA.  Plyometric jumping with B UE A x 10, B hip ab/adduction jump x 10 with min A.  Standing balance on blue foam with alternating tapping cone x 10 without UE A with min/mod A.  Standing on rocker board R/L and ant/post without UE A with emphasis on keeping knees flexed.  Tall kneeling fwd/bwd walking with min A and LOB anteriorly 30% of the time.  -MW        User Key  (r) = Recorded By, (t) = Taken By, (c) = Cosigned By    Initials Name Provider Type    HANANE Lopez, PT Physical Therapist                        PT Assessment/Plan       10/24/17 1400       PT Assessment    Assessment Comments Pt. requries min/mod A with standing dynamic balance on uneven surfaces and VCing to keep B knees flexed.  Pt. to see neurologist tomorrow for follow up.  Pt. " "to benefit from continued therapy services.  -MW     PT Plan    PT Plan Comments Continue with PT services to improve gait, balance, strength, transfers and functional mobility.  -MW       User Key  (r) = Recorded By, (t) = Taken By, (c) = Cosigned By    Initials Name Provider Type    HANANE Lopez, PT Physical Therapist                     Exercises       10/24/17 1400          Subjective Comments    Subjective Comments \"I forgot to call about my brace.  I wore it at work yesterday and it made me feel off balance so I took it off after a few hours.\"  -MW      Subjective Pain    Able to rate subjective pain? yes  -MW      Pre-Treatment Pain Level 0  -MW      Post-Treatment Pain Level 0  -MW      Exercise 1    Exercise Name 1 NuStep L6  -MW      Time (Minutes) 1 10   B UE/LEs  -MW      Exercise 2    Exercise Name 2 Quadraped B LE's  -MW      Sets 2 1  -MW      Reps 2 5  -MW      Exercise 3    Exercise Name 3 Quadraped opposite UE/LE's  -MW      Sets 3 1  -MW      Reps 3 10  -MW      Additional Comments min A  -MW        User Key  (r) = Recorded By, (t) = Taken By, (c) = Cosigned By    Initials Name Provider Type    HANANE Lopez PT Physical Therapist                                  Therapy Education       10/24/17 1517          Therapy Education    Given Symptoms/condition management;Posture/body mechanics  -MW      Program Reinforced  -MW      How Provided Verbal  -MW      Provided to Patient  -MW      Level of Understanding Verbalized  -MW        User Key  (r) = Recorded By, (t) = Taken By, (c) = Cosigned By    Initials Name Provider Type    HANNAE Lopez PT Physical Therapist                Time Calculation:   Start Time: 1400  Total Timed Code Minutes- PT: 55 minute(s)     Therapy Charges for Today     Code Description Service Date Service Provider Modifiers Qty    15030772323 HC PT NEUROMUSC RE EDUCATION EA 15 MIN 10/24/2017 Meghan Lopez, PT GP 3    01665697585 HC PT THER PROC EA 15 MIN " 10/24/2017 Meghan Lopez, PT GP 1                    Meghan Lopez, PT  10/24/2017

## 2017-10-24 NOTE — THERAPY TREATMENT NOTE
Outpatient Occupational Therapy Neuro Treatment Note  [facility]     Patient Name: Yifan Bowman  : 1975  MRN: [id number]  Today's Date: 10/24/2017       Visit Date: 10/24/2017    Patient Active Problem List   Diagnosis   • MS (multiple sclerosis)   • Left hip pain        Past Medical History:   Diagnosis Date   • MS (multiple sclerosis) 3/23/2017        Past Surgical History:   Procedure Laterality Date   •  SECTION     • REDUCTION MAMMAPLASTY           Visit Dx:    ICD-10-CM ICD-9-CM   1. Decreased strength, endurance, and mobility R53.1 780.79    Z74.09 780.99   2. Decreased independence with activities of daily living Z65.8 V62.89   3. Decreased coordination R27.9 781.3                         OT Assessment/Plan       10/24/17 1600       OT Assessment    Assessment Comments continues to require occasional seated breaks d/t fatigue; however progressing and good participation throughout.   -EM     OT Plan    OT Plan Comments Continue per POC  -EM       User Key  (r) = Recorded By, (t) = Taken By, (c) = Cosigned By    Initials Name Provider Type    EM Adriana Fuller OTR Occupational Therapist                    Therapy Education       10/24/17 1557 10/24/17 1517       Therapy Education    Given HEP  -EM Symptoms/condition management;Posture/body mechanics  -MW     Program Reinforced  -EM Reinforced  -MW     How Provided Verbal  -EM Verbal  -MW     Provided to Patient  -EM Patient  -MW     Level of Understanding Verbalized  -EM Verbalized  -MW       User Key  (r) = Recorded By, (t) = Taken By, (c) = Cosigned By    Initials Name Provider Type    REID Fuller OTR Occupational Therapist    MW Meghan Lopez, PT Physical Therapist                    OT Exercises       10/24/17 1500          Subjective Comments    Subjective Comments Pt reports she didn't have to do as much walking at work today  -EM      Subjective Pain    Able to rate subjective pain? yes  -EM      Pre-Treatment Pain Level 0  -EM       Post-Treatment Pain Level 0  -EM      Exercise 1    Exercise Name 1 Energy and strength  -EM      Equipment 1 UE Ergometer  -EM      Time (Minutes) 1 6 mins each way at levle 7  -EM      Exercise 2    Exercise Name 2 UB strengthening using TRX straps for rows and then squats. with SBA throughout  -EM      Sets 2 3  -EM      Reps 2 10  -EM      Exercise 3    Exercise Name 3 Lunging onto airex with CGA  to toss/catch 4# medicine ball. Repeated lunging laterally.  -EM      Sets 3 3  -EM      Reps 3 10  -EM      Exercise 4    Exercise Name 4 Lateral rasies, front raises while seated EOM  -EM      Equipment 4 Dumbell  -EM      Weights/Plates 4 3  -EM      Sets 4 3  -EM      Reps 4 10  -EM      Exercise 5    Exercise Name 5 bicep curls sitting EOM  -EM      Equipment 5 Dumbell  -EM      Sets 5 3  -EM      Reps 5 15  -EM        User Key  (r) = Recorded By, (t) = Taken By, (c) = Cosigned By    Initials Name Provider Type    EM EZRA York Occupational Therapist                    Time Calculation:   OT Start Time: 1500  Total Timed Code Minutes- OT: 45 minute(s)     Therapy Charges for Today     Code Description Service Date Service Provider Modifiers Qty    06050507950  OT NEUROMUSC RE EDUCATION EA 15 MIN 10/24/2017 EZRA York GO 1    [id number]  OT THER PROC EA 15 MIN 10/24/2017 EZRA York GO 2                    EZRA Gaitan  10/24/2017

## 2017-10-25 ENCOUNTER — LAB (OUTPATIENT)
Dept: LAB | Facility: HOSPITAL | Age: 42
End: 2017-10-25

## 2017-10-25 ENCOUNTER — OFFICE VISIT (OUTPATIENT)
Dept: NEUROLOGY | Facility: CLINIC | Age: 42
End: 2017-10-25

## 2017-10-25 VITALS
SYSTOLIC BLOOD PRESSURE: 120 MMHG | OXYGEN SATURATION: 97 % | HEART RATE: 79 BPM | BODY MASS INDEX: 33.66 KG/M2 | HEIGHT: 63 IN | WEIGHT: 190 LBS | DIASTOLIC BLOOD PRESSURE: 80 MMHG

## 2017-10-25 DIAGNOSIS — G35 MS (MULTIPLE SCLEROSIS) (HCC): Primary | ICD-10-CM

## 2017-10-25 DIAGNOSIS — M25.552 LEFT HIP PAIN: ICD-10-CM

## 2017-10-25 DIAGNOSIS — G35 MS (MULTIPLE SCLEROSIS) (HCC): ICD-10-CM

## 2017-10-25 LAB
ALBUMIN SERPL-MCNC: 4 G/DL (ref 3.2–4.8)
ALBUMIN/GLOB SERPL: 1.2 G/DL (ref 1.5–2.5)
ALP SERPL-CCNC: 120 U/L (ref 25–100)
ALT SERPL W P-5'-P-CCNC: 22 U/L (ref 7–40)
ANION GAP SERPL CALCULATED.3IONS-SCNC: 4 MMOL/L (ref 3–11)
AST SERPL-CCNC: 22 U/L (ref 0–33)
BASOPHILS # BLD AUTO: 0.02 10*3/MM3 (ref 0–0.2)
BASOPHILS NFR BLD AUTO: 0.4 % (ref 0–1)
BILIRUB SERPL-MCNC: 0.5 MG/DL (ref 0.3–1.2)
BUN BLD-MCNC: 9 MG/DL (ref 9–23)
BUN/CREAT SERPL: 15 (ref 7–25)
CALCIUM SPEC-SCNC: 9.1 MG/DL (ref 8.7–10.4)
CHLORIDE SERPL-SCNC: 103 MMOL/L (ref 99–109)
CO2 SERPL-SCNC: 29 MMOL/L (ref 20–31)
CREAT BLD-MCNC: 0.6 MG/DL (ref 0.6–1.3)
DEPRECATED RDW RBC AUTO: 48.9 FL (ref 37–54)
EOSINOPHIL # BLD AUTO: 0.28 10*3/MM3 (ref 0–0.3)
EOSINOPHIL NFR BLD AUTO: 5.6 % (ref 0–3)
ERYTHROCYTE [DISTWIDTH] IN BLOOD BY AUTOMATED COUNT: 15.5 % (ref 11.3–14.5)
GFR SERPL CREATININE-BSD FRML MDRD: 134 ML/MIN/1.73
GLOBULIN UR ELPH-MCNC: 3.4 GM/DL
GLUCOSE BLD-MCNC: 90 MG/DL (ref 70–100)
HCT VFR BLD AUTO: 34.8 % (ref 34.5–44)
HGB BLD-MCNC: 11.3 G/DL (ref 11.5–15.5)
IMM GRANULOCYTES # BLD: 0.01 10*3/MM3 (ref 0–0.03)
IMM GRANULOCYTES NFR BLD: 0.2 % (ref 0–0.6)
LYMPHOCYTES # BLD AUTO: 2.27 10*3/MM3 (ref 0.6–4.8)
LYMPHOCYTES NFR BLD AUTO: 45.1 % (ref 24–44)
MCH RBC QN AUTO: 28.3 PG (ref 27–31)
MCHC RBC AUTO-ENTMCNC: 32.5 G/DL (ref 32–36)
MCV RBC AUTO: 87.2 FL (ref 80–99)
MONOCYTES # BLD AUTO: 0.37 10*3/MM3 (ref 0–1)
MONOCYTES NFR BLD AUTO: 7.4 % (ref 0–12)
NEUTROPHILS # BLD AUTO: 2.08 10*3/MM3 (ref 1.5–8.3)
NEUTROPHILS NFR BLD AUTO: 41.3 % (ref 41–71)
PLATELET # BLD AUTO: 333 10*3/MM3 (ref 150–450)
PMV BLD AUTO: 10 FL (ref 6–12)
POTASSIUM BLD-SCNC: 3.6 MMOL/L (ref 3.5–5.5)
PROT SERPL-MCNC: 7.4 G/DL (ref 5.7–8.2)
RBC # BLD AUTO: 3.99 10*6/MM3 (ref 3.89–5.14)
SODIUM BLD-SCNC: 136 MMOL/L (ref 132–146)
WBC NRBC COR # BLD: 5.03 10*3/MM3 (ref 3.5–10.8)

## 2017-10-25 PROCEDURE — 99213 OFFICE O/P EST LOW 20 MIN: CPT | Performed by: PSYCHIATRY & NEUROLOGY

## 2017-10-25 PROCEDURE — 80053 COMPREHEN METABOLIC PANEL: CPT | Performed by: PSYCHIATRY & NEUROLOGY

## 2017-10-25 PROCEDURE — 85025 COMPLETE CBC W/AUTO DIFF WBC: CPT | Performed by: PSYCHIATRY & NEUROLOGY

## 2017-10-25 PROCEDURE — 36415 COLL VENOUS BLD VENIPUNCTURE: CPT

## 2017-10-25 RX ORDER — LEVETIRACETAM 500 MG/1
1000 TABLET, EXTENDED RELEASE ORAL DAILY
Qty: 60 TABLET | Refills: 11 | Status: SHIPPED | OUTPATIENT
Start: 2017-10-25 | End: 2021-08-24

## 2017-10-25 NOTE — PROGRESS NOTES
Subjective:     Patient ID: Yifan Bowman is a 42 y.o. female.    History of Present Illness     42 y.o. woman returns in follow up for RRMS. Last visit on 17 increased Aubagio 14 mg daily, ordered labs, added LVT and Ampyra.  Referred to PT/OT.       CBC, CMP, TB - NCS    RRMS    Left hip pain continues with walking.  5 - 6/10 intensity. Wearing AFO on left leg at work.  Fatigue is moderate.  Heat intolerance is moderate.  Left 1 - 3 digits numb.   Tolerating Aubagio with mild hair thinning.     Ampyra helped walking improve 50 -60%.    GBP uses prn.     Left leg spasticity    Left hip pain is bothersome.  Keppra of some benefit.     Problem history:    Previously dx and followed by Dr Franklin.  7 years ago fell off porch for unknown reasons.  Left hip is painful and weak.  Pain present when walking.  Left weakness has worsened.  Sx started two months after delivery of 4th child. Band like pain and pressure around lower abdomen.  Noticeable fatigue.  Heat increases sleepiness.  Bladder frequency.  Numbness medial left calf and fingertips.  Started on Aubagio without side effects.      Reviewed Dr Franklin's notes:    Presented on 17 for left LE pain, numbness and weakness for 6 years.  Also, notes intermittent hand numbness.  MRI results below.  Started on Prednisone and Aubagio 7 mg on 17  Labs - ACE, Lyme, LUCY, CMP, CBC, TSH, CRP reviewed    My review of films:  MRI Brain 2/15/17 3/30/17  Large left anterior CC black hole, 10 to 15 T2 lesions, multiple CC lesions. No evidence of enhancement    MRI C-spine 3/30/17 C4/5 T2 cord signal w/o enhancement    The following portions of the patient's history were reviewed and updated as appropriate:   She  has a past medical history of MS (multiple sclerosis) (3/23/2017).  She  has a past surgical history that includes Reduction mammaplasty and  section.  Her family history includes Cancer in her paternal grandfather.  She  reports that she has never  smoked. She has never used smokeless tobacco. She reports that she does not drink alcohol or use illicit drugs.  Current Outpatient Prescriptions   Medication Sig Dispense Refill   • Dalfampridine ER (AMPYRA) 10 MG tablet sustained-release 12 hour Take 10 mg by mouth 2 (Two) Times a Day. 60 tablet 11   • gabapentin (NEURONTIN) 300 MG capsule Take 1 capsule by mouth 3 (Three) Times a Day. 90 capsule 5   • levETIRAcetam XR (KEPPRA XR) 500 MG 24 hr tablet Take 2 tablets by mouth Daily. 60 tablet 11     No current facility-administered medications for this visit.      Current Outpatient Prescriptions on File Prior to Visit   Medication Sig   • Dalfampridine ER (AMPYRA) 10 MG tablet sustained-release 12 hour Take 10 mg by mouth 2 (Two) Times a Day.   • gabapentin (NEURONTIN) 300 MG capsule Take 1 capsule by mouth 3 (Three) Times a Day.   • [DISCONTINUED] levETIRAcetam XR (KEPPRA XR) 500 MG 24 hr tablet Take 1 tablet by mouth Daily.     No current facility-administered medications on file prior to visit.      She is allergic to benadryl [diphenhydramine]..    Review of Systems   Constitutional: Positive for fatigue. Negative for activity change and unexpected weight change.   HENT: Negative for tinnitus and trouble swallowing.    Eyes: Negative for photophobia and visual disturbance.   Respiratory: Negative for apnea and choking.    Cardiovascular: Negative for leg swelling.   Endocrine: Positive for heat intolerance. Negative for cold intolerance.   Genitourinary: Positive for frequency. Negative for difficulty urinating, menstrual problem and urgency.   Musculoskeletal: Positive for arthralgias and gait problem. Negative for back pain.   Skin: Negative for color change.   Allergic/Immunologic: Negative for immunocompromised state.   Neurological: Positive for weakness and numbness. Negative for dizziness, tremors, seizures, syncope, facial asymmetry, speech difficulty and light-headedness.   Hematological: Negative for  adenopathy. Does not bruise/bleed easily.   Psychiatric/Behavioral: Negative for behavioral problems, confusion, decreased concentration, hallucinations and sleep disturbance.        Objective:  Vitals:    10/25/17 0857   BP: 120/80   Pulse: 79   SpO2: 97%   ,  Neurologic Exam     Mental Status   Registration: recalls 3 of 3 objects. Recall at 5 minutes: recalls 3 of 3 objects. Follows 3 step commands.   Attention: normal. Concentration: normal.   Level of consciousness: alert  Knowledge: good and consistent with education.   Able to name object. Able to read. Able to repeat. Able to write. Normal comprehension.     Cranial Nerves     CN II   Visual fields full to confrontation.   Visual acuity: normal  Right visual field deficit: none  Left visual field deficit: none     CN III, IV, VI   Right pupil: Shape: regular. Reactivity: brisk. Consensual response: intact.   Left pupil: Shape: regular. Reactivity: brisk. Consensual response: intact.   Nystagmus: none   Diplopia: none  Ophthalmoparesis: none  Upgaze: normal  Downgaze: normal  Conjugate gaze: present  Vestibulo-ocular reflex: present    CN V   Facial sensation intact.   Right corneal reflex: normal  Left corneal reflex: normal    CN VII   Right facial weakness: none  Left facial weakness: none    CN VIII   Hearing: intact    CN IX, X   Palate: symmetric  Right gag reflex: normal  Left gag reflex: normal    CN XI   Right sternocleidomastoid strength: normal  Left sternocleidomastoid strength: normal    CN XII   Tongue: not atrophic  Fasciculations: absent  Tongue deviation: none    Motor Exam   Muscle bulk: normal  Overall muscle tone: normal  Right arm tone: normal  Left arm tone: normal  Right leg tone: normal  Left leg tone: spastic    Strength   Strength 5/5 except as noted.   Left iliopsoas: 4/5  Left quadriceps: 4/5  Left hamstrin/5  Left glutei: 4/5  Left anterior tibial: 4/5  Left posterior tibial: 4/5  Left peroneal: 4/5  Left gastroc:  4/5    Sensory Exam   Light touch normal.   Vibration normal.   Proprioception normal.   Pinprick normal.              Gait, Coordination, and Reflexes     Gait  Gait: spastic (left leg)    Coordination   Tandem walking coordination: abnormal    Tremor   Resting tremor: absent  Intention tremor: absent  Action tremor: absent    Reflexes   Right brachioradialis: 3+  Left brachioradialis: 3+  Right biceps: 3+  Left biceps: 3+  Right triceps: 3+  Left triceps: 3+  Right patellar: 3+  Left patellar: 4+  Right achilles: 3+  Left achilles: 4+  Right ankle clonus: present  Left ankle clonus: present      Physical Exam   Constitutional: She appears well-developed and well-nourished.   Neurological: She has an abnormal Tandem Gait Test.   Reflex Scores:       Tricep reflexes are 3+ on the right side and 3+ on the left side.       Bicep reflexes are 3+ on the right side and 3+ on the left side.       Brachioradialis reflexes are 3+ on the right side and 3+ on the left side.       Patellar reflexes are 3+ on the right side and 4+ on the left side.       Achilles reflexes are 3+ on the right side and 4+ on the left side.  Nursing note and vitals reviewed.      Assessment/Plan    Problem List Items Addressed This Visit        Nervous and Auditory    MS (multiple sclerosis) - Primary    Current Assessment & Plan     Tolerating increase of Aubagio 14 mg daily    CBC, CMP    Continue Ampyra and GBP          Relevant Orders    CBC & Differential    Comprehensive Metabolic Panel    Left hip pain    Current Assessment & Plan     Pain at 5 -6 /10 in left hip    Increase Keppra XR 1000 mg daily          Relevant Medications    gabapentin (NEURONTIN) 300 MG capsule

## 2017-10-31 ENCOUNTER — HOSPITAL ENCOUNTER (OUTPATIENT)
Dept: PHYSICAL THERAPY | Facility: HOSPITAL | Age: 42
Setting detail: THERAPIES SERIES
Discharge: HOME | End: 2017-10-31

## 2017-10-31 ENCOUNTER — HOSPITAL ENCOUNTER (OUTPATIENT)
Dept: OCCUPATIONAL THERAPY | Facility: HOSPITAL | Age: 42
Setting detail: THERAPIES SERIES
Discharge: HOME | End: 2017-10-31

## 2017-10-31 DIAGNOSIS — R68.89 DECREASED STRENGTH, ENDURANCE, AND MOBILITY: Primary | ICD-10-CM

## 2017-10-31 DIAGNOSIS — Z78.9 DECREASED INDEPENDENCE WITH ACTIVITIES OF DAILY LIVING: ICD-10-CM

## 2017-10-31 DIAGNOSIS — M25.552 LEFT HIP PAIN: ICD-10-CM

## 2017-10-31 DIAGNOSIS — R26.9 GAIT ABNORMALITY: ICD-10-CM

## 2017-10-31 DIAGNOSIS — R26.89 BALANCE PROBLEM: Primary | ICD-10-CM

## 2017-10-31 DIAGNOSIS — R27.8 DECREASED COORDINATION: ICD-10-CM

## 2017-10-31 DIAGNOSIS — R53.1 DECREASED STRENGTH, ENDURANCE, AND MOBILITY: Primary | ICD-10-CM

## 2017-10-31 DIAGNOSIS — Z74.09 DECREASED STRENGTH, ENDURANCE, AND MOBILITY: Primary | ICD-10-CM

## 2017-10-31 PROCEDURE — 97116 GAIT TRAINING THERAPY: CPT | Performed by: PHYSICAL THERAPIST

## 2017-10-31 PROCEDURE — 97110 THERAPEUTIC EXERCISES: CPT | Performed by: OCCUPATIONAL THERAPIST

## 2017-10-31 PROCEDURE — 97530 THERAPEUTIC ACTIVITIES: CPT | Performed by: OCCUPATIONAL THERAPIST

## 2017-10-31 PROCEDURE — 97110 THERAPEUTIC EXERCISES: CPT | Performed by: PHYSICAL THERAPIST

## 2017-10-31 PROCEDURE — 97112 NEUROMUSCULAR REEDUCATION: CPT | Performed by: PHYSICAL THERAPIST

## 2017-11-09 ENCOUNTER — HOSPITAL ENCOUNTER (OUTPATIENT)
Dept: SPEECH THERAPY | Facility: HOSPITAL | Age: 42
Setting detail: THERAPIES SERIES
Discharge: HOME | End: 2017-11-09

## 2017-11-09 DIAGNOSIS — G35 MS (MULTIPLE SCLEROSIS) (HCC): Primary | ICD-10-CM

## 2017-11-09 PROCEDURE — G9169 MEMORY GOAL STATUS: HCPCS

## 2017-11-09 PROCEDURE — 92507 TX SP LANG VOICE COMM INDIV: CPT

## 2017-11-09 PROCEDURE — G9168 MEMORY CURRENT STATUS: HCPCS

## 2017-11-09 PROCEDURE — G9170 MEMORY D/C STATUS: HCPCS

## 2017-11-09 NOTE — THERAPY DISCHARGE NOTE
"Outpatient Speech Language Pathology   Adult Speech Language Cognitive Progress Note/Discharge Summary   [facility]     Patient Name: Yifan Bowman  : 1975  MRN: [id number]  Today's Date: 2017         Visit Date: 2017   Patient Active Problem List   Diagnosis   • MS (multiple sclerosis)   • Left hip pain          Visit Dx:    ICD-10-CM ICD-9-CM   1. MS (multiple sclerosis) G35 340                             SLP OP Goals       17 1600 17 1500    Goal Type Needed    Goal Type Needed Memory;Other Adult Goals  -HG --  -HG    Subjective Comments    Subjective Comments Pt alert, cooperative and feels that she is \"functioning just fine at this time.\"   -HG --  -HG    Memory Goals    Patient will be able to remember  information needed to participate in activities of daily living with 90% accuracy.  -HG --  -HG    Status: Patient will be able to remember  information needed to participate in activities of daily living Achieved  -HG --  -HG    Comments: Patient will be able to remember  information needed to participate in activities of daily living 17: Pt uses a calendar at home and is able to track her appts and her kid's appts. 17: Pt is fxning by using a home calendar and currently not using cell phone for appts.   -HG --  -HG    Patient will demonstrate improved ability to recall information by immediately recalling a series of words 80%:;related;after delay;with cues  -HG --  -HG    Status: Patient will demonstrate improved ability to recall information by immediately recalling a series of words Progressing as expected  -HG --  -HG    Comments: Patient will demonstrate improved ability to recall information by immediately recalling a series of words 17: Immediate recall score on RBANS improved from a 69 to a 103. 17: 6 un-related picture recall, pt was 6/6, progressed to 7 pictures, T1: 7/7 with hesitation on 2/7, T2: 7/7, T3: 7/7. Progressed to 8 items and was 6/8.  "    9/18/17:6 un-related pictures from previous session and pt was 5/6, 6/6, 6/6 9/12/17: Visual recall for 4 un-related pictures and pt was T1: 4/4, T2: 4/4, Increased level of difficulty to 5 pictures and pt was 5/5 with delay. 6 pictures, pt was 4/6.      -HG --  -HG    Patient’s memory skills will be enhanced as reported by patient by utilizing internal memory strategies to recall up to 3 pieces of information after a 5- minute delay 80%:;with cues  -HG --  -HG    Status: Patient’s memory skills will be enhanced as reported by patient by utilizing internal memory strategies to recall up to 3 pieces of information after a 5- minute delay Progressing as expected  -HG --  -HG    Comments: Patient’s memory skills will be enhanced as reported by patient by utilizing internal memory strategies to recall up to 3 pieces of information after a 5- minute delay 11/9/17: Delayed recall score on RBANS improved from a 83 to a 98. 9/26/17: Three un-related words after a time delay, pt was T1:3/3 x 2, fourth word added and pt was: 4/4 x 3.  -HG --  -HG    Patient will demonstrate improved ability to recall information by listening to paragraph and answering yes/no questions 80%:;without cues  -HG --  -HG    Status: Patient will demonstrate improved ability to recall information by listening to paragraph and answering yes/no questions Progressing as expected  -HG --  -HG    Comments: Patient will demonstrate improved ability to recall information by listening to paragraph and answering yes/no questions 9/26/17: Short paragraphs, pt was 90% accurate. 9/18/17: 22-36 word paragraphs: T1: 3/3, T2: 2/3 T3: 3/3.  36-50 words: T1: 4/4, T2: 4/4, 50-65 Words: 5/6, 9/12/17:  short paragraph recall: pt was 3/3.   -HG --  -HG    Other Goals    Other Adult Goal- 1 Pt will complete visuospatial tasks with 80% accuracy.  -HG --  -HG    Status: Other Adult Goal- 1 Progressing as expected  -HG --  -HG    Comments: Other Adult Goal- 1 11/9/17:  Visuospatial constructs score on RBANS improved from a 90 to a 100. 9/26/17: Visuospatial constructs in session, pt was 70% accurate.  9/18/17: Visual recall for picture scene: pt was 80% accurate. 9/12/17:  For basic visuospatial tasks, pt was 70% accurate.   -HG --  -HG    Other Adult Goal- 2 Pt will complete thought organization tasks with 80% accuracy.   -HG --  -HG    Status: Other Adult Goal- 2 Progressing as expected  -HG --  -HG    Comments: Other Adult Goal- 2 11/9/17: Language score on RBANS improved from a 90 to a 100. 9/26/17: Word deductions: pt was 100% accurate. 9/18/17: Scrambled words: pt was 50% accurate independently. 9/12/17:  abstract category naming: 10/10,  8/10,   -HG --  -HG    SLP Time Calculation    SLP Goal Re-Cert Due Date 12/09/17  -HG --  -HG      User Key  (r) = Recorded By, (t) = Taken By, (c) = Cosigned By    Initials Name Provider Type    KIANA Peterson MS [facility]-SLP Speech and Language Pathologist                OP SLP Education       11/09/17 1600    Education    Education Comments Compensatory strategies reviewed this date following the assessment. Acros the board, improvements in all areas.   -HG      User Key  (r) = Recorded By, (t) = Taken By, (c) = Cosigned By    Initials Name Effective Dates    KIANA Peterson MS CCC-SLP 06/22/15 -                 OP SLP Assessment/Plan - 11/09/17 1600     SLP Assessment    Functional Problems Speech Language- Adult/Cognition  -HG    Clinical Impression: Speech Language-Adult/Congnition Minimal:;Mild:;Cognitive Communication Impairment  -HG    Clinical Impression Comments RBANS total score improved from a 77 to a 102 falling in the average score range.   -HG    Please refer to paper survey for additional self-reported information Yes  -HG    Please refer to items scanned into chart for additional diagnostic informaiton and handouts as provided by clinician Yes  -HG    SLP Diagnosis Minimal-Mild Cognitive Deficit  -HG    Prognosis  Excellent (comment)  -    SLP Plan    Plan Comments D/C from skilled services with compensatory strategies in place.   -      User Key  (r) = Recorded By, (t) = Taken By, (c) = Cosigned By    Initials Name Provider Type     Birgit Peterson MS CCC-SLP Speech and Language Pathologist                   Time Calculation:   SLP Start Time: 1600    Therapy Charges for Today     Code Description Service Date Service Provider Modifiers Qty    95763559895 HC ST MEMORY CURRENT 11/9/2017 Birgit Peterson MS CCC-SLP GN, CI 1    [id number] HC ST MEMORY PROJECTED 11/9/2017 Birgit Peterson MS CCC-SLP GN, CI 1    [id number] HC ST MEMORY DISCHARGE 11/9/2017 Birgit Peterson MS CCC-SLP GN, CI 1    46440121495 HC ST TREATMENT SPEECH 5 11/9/2017 Birgit Peterson MS CCC-SLP GN 1          SLP G-Codes  Functional Limitations: Memory  Memory Current Status (): At least 1 percent but less than 20 percent impaired, limited or restricted  Memory Goal Status (): At least 1 percent but less than 20 percent impaired, limited or restricted  Memory Discharge Status (): At least 1 percent but less than 20 percent impaired, limited or restricted             Birgit Peterson MS CCC-SLP  11/9/2017

## 2017-11-10 ENCOUNTER — APPOINTMENT (OUTPATIENT)
Dept: OCCUPATIONAL THERAPY | Facility: HOSPITAL | Age: 42
End: 2017-11-10

## 2017-11-10 ENCOUNTER — APPOINTMENT (OUTPATIENT)
Dept: PHYSICAL THERAPY | Facility: HOSPITAL | Age: 42
End: 2017-11-10

## 2017-11-14 ENCOUNTER — HOSPITAL ENCOUNTER (OUTPATIENT)
Dept: OCCUPATIONAL THERAPY | Facility: HOSPITAL | Age: 42
Setting detail: THERAPIES SERIES
Discharge: HOME | End: 2017-11-14

## 2017-11-14 ENCOUNTER — HOSPITAL ENCOUNTER (OUTPATIENT)
Dept: PHYSICAL THERAPY | Facility: HOSPITAL | Age: 42
Setting detail: THERAPIES SERIES
Discharge: HOME | End: 2017-11-14

## 2017-11-14 DIAGNOSIS — R26.9 GAIT ABNORMALITY: ICD-10-CM

## 2017-11-14 DIAGNOSIS — M25.552 LEFT HIP PAIN: ICD-10-CM

## 2017-11-14 DIAGNOSIS — Z78.9 DECREASED INDEPENDENCE WITH ACTIVITIES OF DAILY LIVING: ICD-10-CM

## 2017-11-14 DIAGNOSIS — R27.8 DECREASED COORDINATION: ICD-10-CM

## 2017-11-14 DIAGNOSIS — R53.1 DECREASED STRENGTH, ENDURANCE, AND MOBILITY: Primary | ICD-10-CM

## 2017-11-14 DIAGNOSIS — R68.89 DECREASED STRENGTH, ENDURANCE, AND MOBILITY: Primary | ICD-10-CM

## 2017-11-14 DIAGNOSIS — R29.898 LEFT LEG WEAKNESS: ICD-10-CM

## 2017-11-14 DIAGNOSIS — Z74.09 DECREASED STRENGTH, ENDURANCE, AND MOBILITY: Primary | ICD-10-CM

## 2017-11-14 DIAGNOSIS — R26.89 BALANCE PROBLEM: Primary | ICD-10-CM

## 2017-11-14 PROCEDURE — 97112 NEUROMUSCULAR REEDUCATION: CPT | Performed by: OCCUPATIONAL THERAPIST

## 2017-11-14 PROCEDURE — 97110 THERAPEUTIC EXERCISES: CPT | Performed by: PHYSICAL THERAPIST

## 2017-11-14 PROCEDURE — 97112 NEUROMUSCULAR REEDUCATION: CPT | Performed by: PHYSICAL THERAPIST

## 2017-11-14 PROCEDURE — 97032 APPL MODALITY 1+ESTIM EA 15: CPT | Performed by: PHYSICAL THERAPIST

## 2017-11-14 NOTE — THERAPY DISCHARGE NOTE
Outpatient Occupational Therapy Neuro Progress Note/Discharge Summary   [facility]     Patient Name: Yifan Bowman  : 1975  MRN: [id number]  Today's Date: 2017      Visit Date: 2017    Patient Active Problem List   Diagnosis   • MS (multiple sclerosis)   • Left hip pain        Past Medical History:   Diagnosis Date   • MS (multiple sclerosis) 3/23/2017        Past Surgical History:   Procedure Laterality Date   •  SECTION     • REDUCTION MAMMAPLASTY           Visit Dx:    ICD-10-CM ICD-9-CM   1. Decreased strength, endurance, and mobility R53.1 780.79    Z74.09 780.99   2. Decreased coordination R27.9 781.3   3. Decreased independence with activities of daily living Z65.8 V62.89             OT Neuro       17 1300          Subjective Comments    Subjective Comments Pt reports no new concerns  -EM      Subjective Pain    Able to rate subjective pain? yes  -EM      Pre-Treatment Pain Level 0  -EM      Post-Treatment Pain Level 0  -EM      Sensation    Additional Comments no changes since last assessment  -EM      Left Shoulder    Flexion Gross Movement (4+/5) good plus  -EM      ABduction Gross Movement (4+/5) good plus  -EM      Right Shoulder    Flexion Gross Movement (4+/5) good plus  -EM      ABduction Gross Movement (4+/5) good plus  -EM      Left Elbow/Forearm    Elbow Flexion Gross Movement (5/5) normal  -EM      Elbow Extension Gross Movement (5/5) normal  -EM      Right Elbow/Forearm    Elbow Flexion Gross Movement (5/5) normal  -EM      Elbow Extension Gross Movement (5/5) normal  -EM      Left Wrist    Wrist Flexion Gross Movement (5/5) normal  -EM      Wrist Extension Gross Movement (5/5) normal  -EM      Right Wrist    Wrist Flexion Gross Movement (5/5) normal  -EM      Wrist Extension Gross Movement (5/5) normal  -EM      ADL Assessment/Intervention    IADL Assess/Train, Comment no changes since last reassessment  -EM        User Key  (r) = Recorded By, (t) = Taken By,  (c) = Cosigned By    Initials Name Provider Type    REID BRADLEY , OTR Occupational Therapist             Hand Therapy (last 24 hours)      Hand Eval       11/14/17 1300           Strength Right    # Reps 3  -EM      Right Rung 2  -EM      Right  Test 1 40  -EM      Right  Test 2 36  -EM      Right  Test 3 27  -EM       Strength Average Right 34.33  -EM       Strength Left    # Reps 3  -EM      Left Rung 2  -EM      Left  Test 1 42  -EM      Left  Test 2 36  -EM      Left  Test 3 24  -EM       Strength Average Left 34  -EM      Right Hand Strength - Pinch (lbs)    Lateral 11.3 lbs  -EM      Left Hand Strength - Pinch (lbs)    Lateral 10.3 lbs  -EM        User Key  (r) = Recorded By, (t) = Taken By, (c) = Cosigned By    Initials Name Provider Type    REID Bazanpily, OTR Occupational Therapist                    OT Assessment/Plan       11/14/17 1605 11/14/17 1400    OT Assessment    Assessment Comments Pt demo increased coordination, increased confidence with balance during functional tasks and increased energy level.  Limited changes in /pinch this date; however patient reports using her hands a lot at work today and continued numbness in L finger tips.   -EM     OT Plan    Predicted Duration of Therapy Intervention (days/wks)  7 visits  -    OT Plan Comments discharge at this time.   -EM       User Key  (r) = Recorded By, (t) = Taken By, (c) = Cosigned By    Initials Name Provider Type    REID BRADLEY , OTR Occupational Therapist     Meghan Lopez, PT Physical Therapist                 OT Goals       11/14/17 1300       OT Short Term Goals    STG Date to Achieve 11/14/17  -EM     STG 1 Pt will be educated in UE HEP to increase overall strength and functional use of B UEs for ADL tasks  -EM     STG 1 Progress Met  -EM     STG 2 Pt will be educated in HEP to increase coordination and sensation in B hands to increase functional use  -EM     STG 2 Progress Met   -EM     STG 3 Pt will participate in dynamic standing activities with min A while incorporating B hands to increase functional balance and mobility  -EM     STG 3 Progress Met  -EM     Long Term Goals    LTG Date to Achieve 12/12/17  -EM     LTG 1 Pt will be independent with all HEPs  -EM     LTG 1 Progress Met  -EM     LTG 2 Patient will participate in dynamic standing activities with SBA while incorporating B UEs to increase functional balance and mobility.   -EM     LTG 2 Progress Met  -EM     LTG 3 Pt will score < 23 secs bilaterally on 9 HPT to indicate increased coordination for ADL takss  -EM     LTG 3 Progress Met  -EM     LTG 4 Pt will increase  strength to 40# bilaterally to increase functional tasks  -EM     LTG 4 Progress Not Met  -EM     LTG 4 Progress Comments Pt reports using her hands a lot at work today - strength fluctuates depending on how much she uses them  -EM       User Key  (r) = Recorded By, (t) = Taken By, (c) = Cosigned By    Initials Name Provider Type    EZRA Garcias Occupational Therapist                Therapy Education       11/14/17 1604          Therapy Education    Given HEP  -EM      Program Reinforced  -EM      How Provided Verbal  -EM      Provided to Patient  -EM      Level of Understanding Verbalized  -EM        User Key  (r) = Recorded By, (t) = Taken By, (c) = Cosigned By    Initials Name Provider Type    EZRA Garcias Occupational Therapist                Modalities       11/14/17 1400          ELECTRICAL STIMULATION    Attended/Unattended Attended  -MW      Stimulation Type --   Russian  -MW      Max mAmp 27  -MW      Location/Electrode Placement/Other prone B hamstring (reciprical)  -MW      Rx Minutes Other:   8  -MW        User Key  (r) = Recorded By, (t) = Taken By, (c) = Cosigned By    Initials Name Provider Type    HANANE Lopez, PT Physical Therapist                OT Exercises       11/14/17 1300          Exercise 1    Exercise Name 1 UE  strengthening using forward pass  -EM      Sets 1 4  -EM      Reps 1 25  -EM      Exercise 2    Exercise Name 2 Rows using TRX straps with SBA  -EM      Sets 2 3  -EM      Reps 2 10  -EM      Exercise 3    Exercise Name 3 Discharge evaluation completed.   -EM        User Key  (r) = Recorded By, (t) = Taken By, (c) = Cosigned By    Initials Name Provider Type    EM Adriana Fuller OTR Occupational Therapist                    Outcome Measures       11/14/17 1400 11/14/17 1300       9 Hole Peg    9-Hole Peg Left  25.29  -EM     9-Hole Peg Right  21.58  -EM     Loyd Balance Scale    Sitting to Standing 4  -MW      Standing Unsupported 4  -MW      Sitting with Back Unsupported but Feet Supported on Floor or on Stool 4  -MW      Standing to Sitting 4  -MW      Transfers 4  -MW      Standing Unsupported with Eyes Closed 4  -MW      Standing Unsupported with Feet Together 4  -MW      Reaching Forward with Outstretched Arm While Standing 4  -MW       Object From the Floor From a Standing Position 4  -MW      Turning to Look Behind Over Left and Right Shoulders While Standing 4  -MW      Turn 360 Degrees 4  -MW      Place Alternate Foot on Step or Stool While Standing Unsupported 4  -MW      Standing Unsupported with One Foot in Front 2  -MW      Standing on One Leg 1  -MW      Loyd Total Score 51  -MW      Dynamic Gait Index (DGI)    Gait Level Surface 2  -MW      Change in Gait Speed 2  -MW      Gait with Horizontal Head Turns 2  -MW      Gait with Vertical Head Turns 2  -MW      Gait and Pivot Turn 3  -MW      Step Over Obstacle 1  -MW      Step Around Obstacles 2  -MW      Steps 2  -MW      Dynamic Gait Index Score 16  -MW      Functional Gait Assessment (FGA)    Gait Level Surface 2  -MW      Change in Gait Speed 2  -MW      Gait with Horizontal Head Turns 2  -MW      Gait with Vertical Head Turns 2  -MW      Gait and Pivot Turn 3  -MW      Step Over Obstacle 1  -MW      Gait with Narrow Base of Support 0  -MW       Gait with Eyes Closed 2  -MW      Ambulating Backwards 2  -MW      Steps 2  -MW      FGA Total Score 18  -MW      Purdue Pegboard    Left Hand (30 seconds)  14  -EM     Right Hand (30 seconds)  14  -EM     Both Hands (30 seconds)  10  -EM     Right + Left + Both Hands  38  -EM     Assembly (60 seconds)  26  -EM     Other Outcome Measure Tool Used    Other Outcome Measure Tool Comments  modified Falls Efficacy Scale = 8.14, MFIS - 19/84  -EM     Functional Assessment    Outcome Measure Options Loyd Balance;Dynamic Gait Index;FGA (Functional Gait Assessment)  -MW        User Key  (r) = Recorded By, (t) = Taken By, (c) = Cosigned By    Initials Name Provider Type    EM Adriana Fuller OTR Occupational Therapist    HANANE Lopez, PT Physical Therapist            Time Calculation:   OT Start Time: 1300  Total Timed Code Minutes- OT: 55 minute(s)     Therapy Charges for Today     Code Description Service Date Service Provider Modifiers Qty    58856791529 HC OT NEUROMUSC RE EDUCATION EA 15 MIN 11/14/2017 EZRA York GO 4                OP OT Discharge Summary  Date of Discharge: 11/14/17  Reason for Discharge: Maximum functional potential achieved (at this time)  Outcomes Achieved: Patient able to partially acheive established goals  Discharge Destination: Home with home program  Discharge Instructions: Return to therapy as needed per MD order, continue with daily activities and HEPs.        EZRA Gaitan  11/14/2017

## 2017-11-14 NOTE — THERAPY PROGRESS REPORT/RE-CERT
Outpatient Physical Therapy Neuro Re-Assessment  [facility]     Patient Name: Yifan Bowman  : 1975  MRN: [id number]  Today's Date: 2017      Visit Date: 2017    Patient Active Problem List   Diagnosis   • MS (multiple sclerosis)   • Left hip pain        Past Medical History:   Diagnosis Date   • MS (multiple sclerosis) 3/23/2017        Past Surgical History:   Procedure Laterality Date   •  SECTION     • REDUCTION MAMMAPLASTY           Visit Dx:     ICD-10-CM ICD-9-CM   1. Balance problem R26.89 781.99   2. Left hip pain M25.552 719.45   3. Gait abnormality R26.9 781.2   4. Left leg weakness R29.898 729.89              Hand Therapy (last 24 hours)      Hand Eval       17 1300           Strength Right    # Reps 3  -EM      Right Rung 2  -EM      Right  Test 1 40  -EM      Right  Test 2 36  -EM      Right  Test 3 27  -EM       Strength Average Right 34.33  -EM       Strength Left    # Reps 3  -EM      Left Rung 2  -EM      Left  Test 1 42  -EM      Left  Test 2 36  -EM      Left  Test 3 24  -EM       Strength Average Left 34  -EM      Right Hand Strength - Pinch (lbs)    Lateral 11.3 lbs  -EM      Left Hand Strength - Pinch (lbs)    Lateral 10.3 lbs  -EM        User Key  (r) = Recorded By, (t) = Taken By, (c) = Cosigned By    Initials Name Provider Type    EM Adriana Fuller, OTR Occupational Therapist                PT Neuro       17 1400          Subjective Pain    Able to rate subjective pain? yes  -MW      Pre-Treatment Pain Level 4  -MW      Subjective Pain Comment L hip pain  -MW      Balance Skills Training    Training Strategies (Balance Skills) Re-assessment completed, see for details.    -MW        User Key  (r) = Recorded By, (t) = Taken By, (c) = Cosigned By    Initials Name Provider Type    HANANE Lopez, PT Physical Therapist                            PT OP Goals       17 1400       PT Short Term Goals    STG  Date to Achieve 10/04/17  -MW     STG 1 Patient to improve STEINER balance score to >/= 49/56 to decrease client's risk of falls.  -MW     STG 1 Progress Met  -MW     STG 2 Patient to perform TUG within 12 sec without LOB for improved functional mobility.  -MW     STG 2 Progress Met  -MW     STG 3 Patient to improve FGA score to >/= 15/30 to decrease client's risk of falls.  -MW     STG 3 Progress Met  -MW     STG 4 Pt. to improve DGI score to >/=  15/24 to decrease pts risk of falls.  -MW     STG 4 Progress Met  -MW     Long Term Goals    LTG Date to Achieve 11/15/17  -MW     LTG 1 Patient to improve STEINER balance score to >/= 54/56 to decrease client's risk of falls.  -MW     LTG 1 Progress Ongoing  -MW     LTG 2 Patient to perform TUG within 10.5 sec without LOB for improved functional mobility.  -MW     LTG 2 Progress Met  -MW     LTG 3 Patient to improve FGA score to >/= 22/30 to decrease client's risk of falls.  -MW     LTG 3 Progress Ongoing  -MW     LTG 4 Pt. to improve DGI score to >/=  21/24 to decrease pts risk of falls.  -MW     LTG 4 Progress Ongoing  -MW     LTG 5 Patient to ambulate 25 feet without AD within 8.5 sec without LOB at a regular pace for improved gait jena  -     LTG 5 Progress Met  -MW     LTG 6 Patient to be I with HEP  -MW     LTG 6 Progress Ongoing  -MW     Time Calculation    PT Goal Re-Cert Due Date 12/17/17  -       User Key  (r) = Recorded By, (t) = Taken By, (c) = Cosigned By    Initials Name Provider Type    HANANE Lopez, PT Physical Therapist                PT Assessment/Plan       11/14/17 1400       PT Assessment    Functional Limitations Decreased safety during functional activities;Impaired gait;Impaired locomotion;Limitation in home management;Limitations in community activities;Performance in leisure activities  -     Impairments Balance;Coordination;Endurance;Gait;Impaired flexibility;Locomotion;Muscle strength;Pain;Sensation;Range of motion  -      "Assessment Comments Pt. did not meet any additional goals today.  Pt. to benefit from seven additional visits to meet remaining and use up all therapy visits for the year.  Pt. continues to demonstrate B knees snapping into genu recurvatum in stance secondary to weakness and decreased motor planning and control.  Pt. continues to demonstrate catching L foot with gait with foot drop.  Pt. to attend therapy session next week after being seen by [facility] to have AFO adjusted.  -MW     Please refer to paper survey for additional self-reported information Yes  -MW     Rehab Potential Good  -MW     Patient/caregiver participated in establishment of treatment plan and goals Yes  -MW     Patient would benefit from skilled therapy intervention Yes  -MW     PT Plan    PT Frequency 1x/week  -MW     Predicted Duration of Therapy Intervention (days/wks) 7 visits  -MW     Planned CPT's? PT THER PROC EA 15 MIN: 25977;PT NEUROMUSC RE-EDUCATION EA 15 MIN: 00843;PT GAIT TRAINING EA 15 MIN: 92169  -MW     PT Plan Comments Continue with PT services to improve gait, balance, strength, transfers and functional mobility.  -MW       User Key  (r) = Recorded By, (t) = Taken By, (c) = Cosigned By    Initials Name Provider Type    HANANE Lopez, PT Physical Therapist                 Modalities       11/14/17 1400          ELECTRICAL STIMULATION    Attended/Unattended Attended  -MW      Stimulation Type --   Russian  -MW      Max mAmp 27  -MW      Location/Electrode Placement/Other prone B hamstring (reciprical)  -MW      Rx Minutes Other:   8  -MW        User Key  (r) = Recorded By, (t) = Taken By, (c) = Cosigned By    Initials Name Provider Type    HANANE Lopez, PT Physical Therapist              Exercises       11/14/17 1400          Subjective Comments    Subjective Comments \"I haven't been wearing the brace to work b/c it doesn't work for me.  I see [facility] next Monday before I see you.\"  -MW      " Subjective Pain    Able to rate subjective pain? yes  -MW      Pre-Treatment Pain Level 4  -MW      Post-Treatment Pain Level 4  -MW      Subjective Pain Comment L hip pain  -MW      Exercise 1    Exercise Name 1 NuStep L6  -MW      Time (Minutes) 1 10   B UE/LEs  -MW      Exercise 2    Exercise Name 2 DLP  -MW      Sets 2 1  -MW      Time (Minutes) 2 2  -MW      Exercise 3    Exercise Name 3 B heel raises  -MW      Sets 3 2  -MW      Reps 3 10  -MW        User Key  (r) = Recorded By, (t) = Taken By, (c) = Cosigned By    Initials Name Provider Type    MW Meghan Lopez, PT Physical Therapist                            Outcome Measures       11/14/17 1400 11/14/17 1300       9 Hole Peg    9-Hole Peg Left  25.29  -EM     9-Hole Peg Right  21.58  -EM     Loyd Balance Scale    Sitting to Standing 4  -MW      Standing Unsupported 4  -MW      Sitting with Back Unsupported but Feet Supported on Floor or on Stool 4  -MW      Standing to Sitting 4  -MW      Transfers 4  -MW      Standing Unsupported with Eyes Closed 4  -MW      Standing Unsupported with Feet Together 4  -MW      Reaching Forward with Outstretched Arm While Standing 4  -MW       Object From the Floor From a Standing Position 4  -MW      Turning to Look Behind Over Left and Right Shoulders While Standing 4  -MW      Turn 360 Degrees 4  -MW      Place Alternate Foot on Step or Stool While Standing Unsupported 4  -MW      Standing Unsupported with One Foot in Front 2  -MW      Standing on One Leg 1  -MW      Loyd Total Score 51  -MW      Dynamic Gait Index (DGI)    Gait Level Surface 2  -MW      Change in Gait Speed 2  -MW      Gait with Horizontal Head Turns 2  -MW      Gait with Vertical Head Turns 2  -MW      Gait and Pivot Turn 3  -MW      Step Over Obstacle 1  -MW      Step Around Obstacles 2  -MW      Steps 2  -MW      Dynamic Gait Index Score 16  -MW      Functional Gait Assessment (FGA)    Gait Level Surface 2  -MW      Change in Gait Speed 2   -MW      Gait with Horizontal Head Turns 2  -MW      Gait with Vertical Head Turns 2  -MW      Gait and Pivot Turn 3  -MW      Step Over Obstacle 1  -MW      Gait with Narrow Base of Support 0  -MW      Gait with Eyes Closed 2  -MW      Ambulating Backwards 2  -MW      Steps 2  -MW      FGA Total Score 18  -MW      Purdue Pegboard    Left Hand (30 seconds)  14  -EM     Right Hand (30 seconds)  14  -EM     Both Hands (30 seconds)  10  -EM     Right + Left + Both Hands  38  -EM     Assembly (60 seconds)  26  -EM     Other Outcome Measure Tool Used    Other Outcome Measure Tool Comments  modified Falls Efficacy Scale = 8.14, MFIS - 19/84  -EM     Functional Assessment    Outcome Measure Options Loyd Balance;Dynamic Gait Index;FGA (Functional Gait Assessment)  -MW        User Key  (r) = Recorded By, (t) = Taken By, (c) = Cosigned By    Initials Name Provider Type    EM Adriana Fuller, OTR Occupational Therapist    MW Meghan Lopez, PT Physical Therapist          Time Calculation:   Start Time: 1400  Total Timed Code Minutes- PT: 55 minute(s)     Therapy Charges for Today     Code Description Service Date Service Provider Modifiers Qty    88343004388  PT THER PROC EA 15 MIN 11/14/2017 Meghan Lopez, PT GP 2    23630341532 HC PT NEUROMUSC RE EDUCATION EA 15 MIN 11/14/2017 Meghan Lopez, PT GP 1    [id number]  PT ELEC STIM EA-PER 15 MIN 11/14/2017 Meghan Lopez, PT GP 1          PT G-Codes  Outcome Measure Options: Loyd Balance, Dynamic Gait Index, FGA (Functional Gait Assessment)         Meghan Lopez, PT  11/14/2017

## 2017-11-16 ENCOUNTER — APPOINTMENT (OUTPATIENT)
Dept: SPEECH THERAPY | Facility: HOSPITAL | Age: 42
End: 2017-11-16

## 2017-11-20 ENCOUNTER — HOSPITAL ENCOUNTER (OUTPATIENT)
Dept: PHYSICAL THERAPY | Facility: HOSPITAL | Age: 42
Setting detail: THERAPIES SERIES
Discharge: HOME | End: 2017-11-20

## 2017-11-20 DIAGNOSIS — M25.552 LEFT HIP PAIN: ICD-10-CM

## 2017-11-20 DIAGNOSIS — R29.898 LEFT LEG WEAKNESS: ICD-10-CM

## 2017-11-20 DIAGNOSIS — R26.89 BALANCE PROBLEM: Primary | ICD-10-CM

## 2017-11-20 DIAGNOSIS — R26.9 GAIT ABNORMALITY: ICD-10-CM

## 2017-11-20 PROCEDURE — 97112 NEUROMUSCULAR REEDUCATION: CPT | Performed by: PHYSICAL THERAPIST

## 2017-11-20 PROCEDURE — 97110 THERAPEUTIC EXERCISES: CPT | Performed by: PHYSICAL THERAPIST

## 2017-11-20 NOTE — THERAPY TREATMENT NOTE
"    Outpatient Physical Therapy Neuro Treatment Note  [facility]     Patient Name: Yifan Bowman  : 1975  MRN: [id number]  Today's Date: 2017      Visit Date: 2017    Visit Dx:    ICD-10-CM ICD-9-CM   1. Balance problem R26.89 781.99   2. Left hip pain M25.552 719.45   3. Gait abnormality R26.9 781.2   4. Left leg weakness R29.898 729.89       Patient Active Problem List   Diagnosis   • MS (multiple sclerosis)   • Left hip pain                 PT Neuro       17 1400          Subjective Comments    Subjective Comments \"I went and got my AFO adjusted.  He cut off the foot plate (in almost half) and added put some padding where my arch is.  That's why I'm late.\"  -MW      Subjective Pain    Able to rate subjective pain? yes  -MW      Pre-Treatment Pain Level 3  -MW      Post-Treatment Pain Level 3  -MW      Subjective Pain Comment L hip pain  -MW      Balance Skills Training    Training Strategies (Balance Skills) Alternating fwd lunges to BOSU x 10 without UE A with min A and then alternating lunges to BOSU and toss/catch 2# ball on/off rebounder x 10 with min/mod A.  Standing on top of rounded side of BOSU with B UE A on TRX with mini-squats x 10, alternating punches x 10 and B shoulder ab/adduction x 10 with min/mod A with LOB posteriorly 40% of the time.  One foot on BOSU, B UE A with other knee to chest x 10 with min/mod A for balance.  -        User Key  (r) = Recorded By, (t) = Taken By, (c) = Cosigned By    Initials Name Provider Type     Meghan Lopez, PT Physical Therapist                        PT Assessment/Plan       17 1400       PT Assessment    Assessment Comments Pt. requires min/mod A with standing dynamic balance activities.  Pt. requires VCing to keep B knees slightly flexed and not lock knees out with activitites.  Pt. to wear adjusted brace and let me know how well it's working.  -     PT Plan    PT Plan Comments Continue with PT services   -       User " "Key  (r) = Recorded By, (t) = Taken By, (c) = Cosigned By    Initials Name Provider Type    HANANE Lopez PT Physical Therapist                     Exercises       11/20/17 1400          Subjective Comments    Subjective Comments \"I went and got my AFO adjusted.  He cut off the foot plate (in almost half) and added put some padding where my arch is.  That's why I'm late.\"  -MW      Subjective Pain    Able to rate subjective pain? yes  -MW      Pre-Treatment Pain Level 3  -MW      Post-Treatment Pain Level 3  -MW      Subjective Pain Comment L hip pain  -MW      Exercise 1    Exercise Name 1 NuStep L6  -MW      Time (Minutes) 1 10   B UE/LEs  -MW        User Key  (r) = Recorded By, (t) = Taken By, (c) = Cosigned By    Initials Name Provider Type    HANANE Lopez PT Physical Therapist                                  Therapy Education       11/20/17 1500          Therapy Education    Given Posture/body mechanics  -MW      Program Reinforced  -MW      How Provided Verbal  -MW      Provided to Patient  -MW        User Key  (r) = Recorded By, (t) = Taken By, (c) = Cosigned By    Initials Name Provider Type    HANANE Lopez PT Physical Therapist                Time Calculation:   Start Time: 1415  Total Timed Code Minutes- PT: 45 minute(s)     Therapy Charges for Today     Code Description Service Date Service Provider Modifiers Qty    [id number]  PT NEUROMUSC RE EDUCATION EA 15 MIN 11/20/2017 Meghan Lopez, PT GP 2    99657207259 HC PT THER PROC EA 15 MIN 11/20/2017 Meghan Lopez, PT GP 1                    Meghan Lopez, PT  11/20/2017       "

## 2017-11-23 ENCOUNTER — APPOINTMENT (OUTPATIENT)
Dept: SPEECH THERAPY | Facility: HOSPITAL | Age: 42
End: 2017-11-23

## 2017-11-24 ENCOUNTER — APPOINTMENT (OUTPATIENT)
Dept: OCCUPATIONAL THERAPY | Facility: HOSPITAL | Age: 42
End: 2017-11-24

## 2017-11-28 ENCOUNTER — APPOINTMENT (OUTPATIENT)
Dept: OCCUPATIONAL THERAPY | Facility: HOSPITAL | Age: 42
End: 2017-11-28

## 2017-11-28 ENCOUNTER — APPOINTMENT (OUTPATIENT)
Dept: PHYSICAL THERAPY | Facility: HOSPITAL | Age: 42
End: 2017-11-28

## 2017-11-30 ENCOUNTER — HOSPITAL ENCOUNTER (OUTPATIENT)
Dept: PHYSICAL THERAPY | Facility: HOSPITAL | Age: 42
Setting detail: THERAPIES SERIES
Discharge: HOME | End: 2017-11-30

## 2017-11-30 DIAGNOSIS — M25.552 LEFT HIP PAIN: ICD-10-CM

## 2017-11-30 DIAGNOSIS — R26.89 BALANCE PROBLEM: Primary | ICD-10-CM

## 2017-11-30 DIAGNOSIS — R26.9 GAIT ABNORMALITY: ICD-10-CM

## 2017-11-30 PROCEDURE — 97112 NEUROMUSCULAR REEDUCATION: CPT | Performed by: PHYSICAL THERAPIST

## 2017-11-30 PROCEDURE — 97110 THERAPEUTIC EXERCISES: CPT | Performed by: PHYSICAL THERAPIST

## 2017-12-06 ENCOUNTER — APPOINTMENT (OUTPATIENT)
Dept: PHYSICAL THERAPY | Facility: HOSPITAL | Age: 42
End: 2017-12-06

## 2018-02-26 ENCOUNTER — OFFICE VISIT (OUTPATIENT)
Dept: NEUROLOGY | Facility: CLINIC | Age: 43
End: 2018-02-26

## 2018-02-26 ENCOUNTER — LAB (OUTPATIENT)
Dept: LAB | Facility: HOSPITAL | Age: 43
End: 2018-02-26

## 2018-02-26 VITALS
RESPIRATION RATE: 16 BRPM | OXYGEN SATURATION: 99 % | HEIGHT: 63 IN | HEART RATE: 77 BPM | SYSTOLIC BLOOD PRESSURE: 118 MMHG | DIASTOLIC BLOOD PRESSURE: 70 MMHG | BODY MASS INDEX: 32.07 KG/M2 | WEIGHT: 181 LBS

## 2018-02-26 DIAGNOSIS — G35 MS (MULTIPLE SCLEROSIS) (HCC): Primary | ICD-10-CM

## 2018-02-26 DIAGNOSIS — G35 MS (MULTIPLE SCLEROSIS) (HCC): ICD-10-CM

## 2018-02-26 DIAGNOSIS — M25.552 LEFT HIP PAIN: ICD-10-CM

## 2018-02-26 LAB
ALBUMIN SERPL-MCNC: 3.9 G/DL (ref 3.2–4.8)
ALBUMIN/GLOB SERPL: 1.1 G/DL (ref 1.5–2.5)
ALP SERPL-CCNC: 124 U/L (ref 25–100)
ALT SERPL W P-5'-P-CCNC: 15 U/L (ref 7–40)
ANION GAP SERPL CALCULATED.3IONS-SCNC: 7 MMOL/L (ref 3–11)
AST SERPL-CCNC: 16 U/L (ref 0–33)
BASOPHILS # BLD AUTO: 0.03 10*3/MM3 (ref 0–0.2)
BASOPHILS NFR BLD AUTO: 0.5 % (ref 0–1)
BILIRUB SERPL-MCNC: 0.3 MG/DL (ref 0.3–1.2)
BUN BLD-MCNC: 11 MG/DL (ref 9–23)
BUN/CREAT SERPL: 18.3 (ref 7–25)
CALCIUM SPEC-SCNC: 8.9 MG/DL (ref 8.7–10.4)
CHLORIDE SERPL-SCNC: 103 MMOL/L (ref 99–109)
CO2 SERPL-SCNC: 27 MMOL/L (ref 20–31)
CREAT BLD-MCNC: 0.6 MG/DL (ref 0.6–1.3)
DEPRECATED RDW RBC AUTO: 49.6 FL (ref 37–54)
EOSINOPHIL # BLD AUTO: 0.27 10*3/MM3 (ref 0–0.3)
EOSINOPHIL NFR BLD AUTO: 4.5 % (ref 0–3)
ERYTHROCYTE [DISTWIDTH] IN BLOOD BY AUTOMATED COUNT: 15.5 % (ref 11.3–14.5)
GFR SERPL CREATININE-BSD FRML MDRD: 134 ML/MIN/1.73
GLOBULIN UR ELPH-MCNC: 3.4 GM/DL
GLUCOSE BLD-MCNC: 88 MG/DL (ref 70–100)
HCT VFR BLD AUTO: 33.4 % (ref 34.5–44)
HGB BLD-MCNC: 10.6 G/DL (ref 11.5–15.5)
IMM GRANULOCYTES # BLD: 0.02 10*3/MM3 (ref 0–0.03)
IMM GRANULOCYTES NFR BLD: 0.3 % (ref 0–0.6)
LYMPHOCYTES # BLD AUTO: 2.88 10*3/MM3 (ref 0.6–4.8)
LYMPHOCYTES NFR BLD AUTO: 48 % (ref 24–44)
MCH RBC QN AUTO: 28.2 PG (ref 27–31)
MCHC RBC AUTO-ENTMCNC: 31.7 G/DL (ref 32–36)
MCV RBC AUTO: 88.8 FL (ref 80–99)
MONOCYTES # BLD AUTO: 0.39 10*3/MM3 (ref 0–1)
MONOCYTES NFR BLD AUTO: 6.5 % (ref 0–12)
NEUTROPHILS # BLD AUTO: 2.41 10*3/MM3 (ref 1.5–8.3)
NEUTROPHILS NFR BLD AUTO: 40.2 % (ref 41–71)
PLATELET # BLD AUTO: 349 10*3/MM3 (ref 150–450)
PMV BLD AUTO: 10 FL (ref 6–12)
POTASSIUM BLD-SCNC: 3.8 MMOL/L (ref 3.5–5.5)
PROT SERPL-MCNC: 7.3 G/DL (ref 5.7–8.2)
RBC # BLD AUTO: 3.76 10*6/MM3 (ref 3.89–5.14)
SODIUM BLD-SCNC: 137 MMOL/L (ref 132–146)
WBC NRBC COR # BLD: 6 10*3/MM3 (ref 3.5–10.8)

## 2018-02-26 PROCEDURE — 36415 COLL VENOUS BLD VENIPUNCTURE: CPT

## 2018-02-26 PROCEDURE — 99214 OFFICE O/P EST MOD 30 MIN: CPT | Performed by: PSYCHIATRY & NEUROLOGY

## 2018-02-26 PROCEDURE — 80053 COMPREHEN METABOLIC PANEL: CPT

## 2018-02-26 PROCEDURE — 85025 COMPLETE CBC W/AUTO DIFF WBC: CPT

## 2018-02-26 RX ORDER — ALPRAZOLAM 1 MG/1
1 TABLET ORAL ONCE
Qty: 2 TABLET | Refills: 0 | Status: SHIPPED | OUTPATIENT
Start: 2018-02-26 | End: 2018-02-26

## 2018-02-26 RX ORDER — RENAGEL 800 MG/1
TABLET ORAL
COMMUNITY
Start: 2017-12-29 | End: 2018-09-13 | Stop reason: SDUPTHER

## 2018-02-26 NOTE — PROGRESS NOTES
Subjective:     Patient ID: Yifan Bowman is a 42 y.o. female.  Chief Complaint   Patient presents with   • Multiple Sclerosis       History of Present Illness     42 y.o. woman returns in follow up for RRMS. Last visit on 10/25/17 continued Aubagio 14 mg daily, ordered labs, increased LVT and Ampyra.      CBC, CMP 0 Hgb 11.3    RRMS    Left hip pain decreased 2 - 3/10 intensity. Not wearing AFO on left leg due to making toes numb.  Fatigue is mild.  Heat intolerance is mild.  Left 1 - 3 digits decrease to mild numbness.     Tolerating Aubagio.    Ampyra helped walking improve 50 - 60%.    GBP uses for back pain 1 - 2 times a month.     Left leg spasticity    LVT decreasing stiffness in left leg.       Problem history:    Previously dx and followed by Dr Franklin.  7 years ago fell off porch for unknown reasons.  Left hip is painful and weak.  Pain present when walking.  Left weakness has worsened.  Sx started two months after delivery of 4th child. Band like pain and pressure around lower abdomen.  Noticeable fatigue.  Heat increases sleepiness.  Bladder frequency.  Numbness medial left calf and fingertips.  Started on Aubagio without side effects.      Reviewed Dr Franklin's notes:    Presented on 17 for left LE pain, numbness and weakness for 6 years.  Also, notes intermittent hand numbness.  MRI results below.  Started on Prednisone and Aubagio 7 mg on 17  Labs - ACE, Lyme, LUCY, CMP, CBC, TSH, CRP reviewed    My review of films:  MRI Brain 2/15/17 3/30/17  Large left anterior CC black hole, 10 to 15 T2 lesions, multiple CC lesions. No evidence of enhancement    MRI C-spine 3/30/17 C4/5 T2 cord signal w/o enhancement    The following portions of the patient's history were reviewed and updated as appropriate:   She  has a past medical history of MS (multiple sclerosis) (3/23/2017).  She  has a past surgical history that includes Reduction mammaplasty and  section.  Her family history includes  Cancer in her paternal grandfather.  She  reports that she has never smoked. She has never used smokeless tobacco. She reports that she does not drink alcohol or use illicit drugs.  Current Outpatient Prescriptions   Medication Sig Dispense Refill   • Dalfampridine ER (AMPYRA) 10 MG tablet sustained-release 12 hour Take 10 mg by mouth 2 (Two) Times a Day. 60 tablet 11   • gabapentin (NEURONTIN) 300 MG capsule Take 1 capsule by mouth 3 (Three) Times a Day. 90 capsule 5   • levETIRAcetam XR (KEPPRA XR) 500 MG 24 hr tablet Take 2 tablets by mouth Daily. 60 tablet 11   • AUBAGIO 14 MG tablet        No current facility-administered medications for this visit.      Current Outpatient Prescriptions on File Prior to Visit   Medication Sig   • Dalfampridine ER (AMPYRA) 10 MG tablet sustained-release 12 hour Take 10 mg by mouth 2 (Two) Times a Day.   • gabapentin (NEURONTIN) 300 MG capsule Take 1 capsule by mouth 3 (Three) Times a Day.   • levETIRAcetam XR (KEPPRA XR) 500 MG 24 hr tablet Take 2 tablets by mouth Daily.     No current facility-administered medications on file prior to visit.      She is allergic to benadryl [diphenhydramine]..    Review of Systems   Constitutional: Positive for fatigue. Negative for activity change and unexpected weight change.   HENT: Negative for tinnitus and trouble swallowing.    Eyes: Negative for photophobia and visual disturbance.   Respiratory: Negative for apnea and choking.    Cardiovascular: Negative for leg swelling.   Endocrine: Positive for heat intolerance. Negative for cold intolerance.   Genitourinary: Positive for frequency. Negative for difficulty urinating, menstrual problem and urgency.   Musculoskeletal: Positive for arthralgias and gait problem. Negative for back pain.   Skin: Negative for color change.   Allergic/Immunologic: Negative for immunocompromised state.   Neurological: Positive for weakness and numbness. Negative for dizziness, tremors, seizures, syncope,  facial asymmetry, speech difficulty and light-headedness.   Hematological: Negative for adenopathy. Does not bruise/bleed easily.   Psychiatric/Behavioral: Negative for behavioral problems, confusion, decreased concentration, hallucinations and sleep disturbance.        Objective:  Vitals:    18 0830   BP: 118/70   Pulse: 77   Resp: 16   SpO2: 99%   ,  Neurologic Exam     Mental Status   Registration: recalls 3 of 3 objects. Recall at 5 minutes: recalls 3 of 3 objects. Follows 3 step commands.   Attention: normal. Concentration: normal.   Level of consciousness: alert  Knowledge: good and consistent with education.   Able to name object. Able to read. Able to repeat. Able to write. Normal comprehension.     Cranial Nerves     CN II   Visual fields full to confrontation.   Visual acuity: normal  Right visual field deficit: none  Left visual field deficit: none     CN III, IV, VI   Right pupil: Shape: regular. Reactivity: brisk. Consensual response: intact.   Left pupil: Shape: regular. Reactivity: brisk. Consensual response: intact.   Nystagmus: none   Diplopia: none  Ophthalmoparesis: none  Upgaze: normal  Downgaze: normal  Conjugate gaze: present  Vestibulo-ocular reflex: present    CN V   Facial sensation intact.   Right corneal reflex: normal  Left corneal reflex: normal    CN VII   Right facial weakness: none  Left facial weakness: none    CN VIII   Hearing: intact    CN IX, X   Palate: symmetric  Right gag reflex: normal  Left gag reflex: normal    CN XI   Right sternocleidomastoid strength: normal  Left sternocleidomastoid strength: normal    CN XII   Tongue: not atrophic  Fasciculations: absent  Tongue deviation: none    Motor Exam   Muscle bulk: normal  Overall muscle tone: normal  Right arm tone: normal  Left arm tone: normal  Right leg tone: normal  Left leg tone: increased    Strength   Strength 5/5 except as noted.   Left iliopsoas: 5/5  Left quadriceps: 5/5  Left hamstrin/5  Left glutei:  5/5  Left anterior tibial: 4/5  Left posterior tibial: 4/5  Left peroneal: 4/5  Left gastroc: 4/5    Sensory Exam   Light touch normal.   Vibration normal.   Proprioception normal.   Pinprick normal.              Gait, Coordination, and Reflexes     Gait  Gait: spastic (left leg)    Coordination   Tandem walking coordination: abnormal    Tremor   Resting tremor: absent  Intention tremor: absent  Action tremor: absent    Reflexes   Right brachioradialis: 3+  Left brachioradialis: 3+  Right biceps: 3+  Left biceps: 3+  Right triceps: 3+  Left triceps: 3+  Right patellar: 3+  Left patellar: 4+  Right achilles: 3+  Left achilles: 4+  Right ankle clonus: present  Left ankle clonus: present      Physical Exam   Constitutional: She appears well-developed and well-nourished.   Neurological: She has an abnormal Tandem Gait Test.   Reflex Scores:       Tricep reflexes are 3+ on the right side and 3+ on the left side.       Bicep reflexes are 3+ on the right side and 3+ on the left side.       Brachioradialis reflexes are 3+ on the right side and 3+ on the left side.       Patellar reflexes are 3+ on the right side and 4+ on the left side.       Achilles reflexes are 3+ on the right side and 4+ on the left side.  Nursing note and vitals reviewed.      Assessment/Plan    Problem List Items Addressed This Visit        Nervous and Auditory    MS (multiple sclerosis) - Primary    Current Assessment & Plan     Tolerating and stable on Aubagio    CBC,CMP    MRI Brain    Continue Amprya          Relevant Orders    CBC & Differential    Comprehensive Metabolic Panel    MRI Brain With & Without Contrast    MRI Cervical Spine With & Without Contrast    Left hip pain    Current Assessment & Plan     Improved with LVT and GBP          Relevant Medications    gabapentin (NEURONTIN) 300 MG capsule

## 2018-03-07 ENCOUNTER — HOSPITAL ENCOUNTER (OUTPATIENT)
Dept: MRI IMAGING | Facility: HOSPITAL | Age: 43
Discharge: HOME | End: 2018-03-07
Attending: PSYCHIATRY & NEUROLOGY

## 2018-03-07 ENCOUNTER — HOSPITAL ENCOUNTER (OUTPATIENT)
Dept: MRI IMAGING | Facility: HOSPITAL | Age: 43
Discharge: HOME | End: 2018-03-07
Attending: PSYCHIATRY & NEUROLOGY | Admitting: PSYCHIATRY & NEUROLOGY

## 2018-03-07 DIAGNOSIS — G35 MS (MULTIPLE SCLEROSIS) (HCC): ICD-10-CM

## 2018-03-07 PROCEDURE — A9577 INJ MULTIHANCE: HCPCS | Performed by: PSYCHIATRY & NEUROLOGY

## 2018-03-07 PROCEDURE — 0 GADOBENATE DIMEGLUMINE 529 MG/ML SOLUTION: Performed by: PSYCHIATRY & NEUROLOGY

## 2018-03-07 PROCEDURE — 70553 MRI BRAIN STEM W/O & W/DYE: CPT

## 2018-03-07 PROCEDURE — 72156 MRI NECK SPINE W/O & W/DYE: CPT

## 2018-03-07 RX ADMIN — GADOBENATE DIMEGLUMINE 15 ML: 529 INJECTION, SOLUTION INTRAVENOUS at 13:00

## 2018-03-14 ENCOUNTER — OFFICE VISIT (OUTPATIENT)
Dept: NEUROLOGY | Facility: CLINIC | Age: 43
End: 2018-03-14

## 2018-03-14 ENCOUNTER — LAB (OUTPATIENT)
Dept: LAB | Facility: HOSPITAL | Age: 43
End: 2018-03-14

## 2018-03-14 VITALS
HEART RATE: 80 BPM | OXYGEN SATURATION: 98 % | DIASTOLIC BLOOD PRESSURE: 70 MMHG | HEIGHT: 63 IN | WEIGHT: 182 LBS | BODY MASS INDEX: 32.25 KG/M2 | RESPIRATION RATE: 18 BRPM | SYSTOLIC BLOOD PRESSURE: 104 MMHG

## 2018-03-14 DIAGNOSIS — M62.838 MUSCLE SPASTICITY: ICD-10-CM

## 2018-03-14 DIAGNOSIS — D50.8 IRON DEFICIENCY ANEMIA SECONDARY TO INADEQUATE DIETARY IRON INTAKE: ICD-10-CM

## 2018-03-14 DIAGNOSIS — G35 MS (MULTIPLE SCLEROSIS) (HCC): Primary | ICD-10-CM

## 2018-03-14 LAB
FERRITIN SERPL-MCNC: 11 NG/ML (ref 10–291)
IRON 24H UR-MRATE: 38 MCG/DL (ref 50–175)
RETICS/RBC NFR AUTO: 1.85 % (ref 0.5–1.5)

## 2018-03-14 PROCEDURE — 99214 OFFICE O/P EST MOD 30 MIN: CPT | Performed by: PSYCHIATRY & NEUROLOGY

## 2018-03-14 PROCEDURE — 85045 AUTOMATED RETICULOCYTE COUNT: CPT

## 2018-03-14 PROCEDURE — 82728 ASSAY OF FERRITIN: CPT

## 2018-03-14 PROCEDURE — 36415 COLL VENOUS BLD VENIPUNCTURE: CPT

## 2018-03-14 PROCEDURE — 83540 ASSAY OF IRON: CPT

## 2018-03-14 NOTE — PROGRESS NOTES
Subjective:     Patient ID: Yifan Bowman is a 42 y.o. female.  Chief Complaint   Patient presents with   • Multiple Sclerosis       History of Present Illness     42 y.o. woman returns in follow up for RRMS. Last visit on 2/26/17 continued Aubagio 14 mg daily, ordered labs, MRI Brain continued GBP, LVT and Ampyra.      MRI Brain/Cervical, my review of films, no new/enlarging/enhancing T2 PVWM, stable C3-5 lesion     2/26/18 - Hgb 10.6,     RRMS    Left hip pain continues at 2 /10 intensity.  AFO on left leg makes toes numb and will check with [facility] to improve fit.  Fatigue and Heat intolerance are mild.  Continued Left 1 - 3 digits decrease to mild numbness.      Ampyra helped walking improve 50 - 60%.  Stopped GBP.    Left leg spasticity    LVT decreasing stiffness in left leg.       Problem history:    Previously dx and followed by Dr Franklin.  7 years ago fell off porch for unknown reasons.  Left hip is painful and weak.  Pain present when walking.  Left weakness has worsened.  Sx started two months after delivery of 4th child. Band like pain and pressure around lower abdomen.  Noticeable fatigue.  Heat increases sleepiness.  Bladder frequency.  Numbness medial left calf and fingertips.  Started on Aubagio without side effects.      Reviewed Dr Franklin's notes:    Presented on 2/7/17 for left LE pain, numbness and weakness for 6 years.  Also, notes intermittent hand numbness.  MRI results below.  Started on Prednisone and Aubagio 7 mg on 5/4/17  Labs - ACE, Lyme, LUCY, CMP, CBC, TSH, CRP reviewed    My review of films:  MRI Brain 2/15/17 3/30/17  Large left anterior CC black hole, 10 to 15 T2 lesions, multiple CC lesions. No evidence of enhancement    MRI C-spine 3/30/17 C4/5 T2 cord signal w/o enhancement    The following portions of the patient's history were reviewed and updated as appropriate:   She  has a past medical history of MS (multiple sclerosis) (3/23/2017).  She  has a past surgical  history that includes Reduction mammaplasty and  section.  Her family history includes Cancer in her paternal grandfather.  She  reports that she has never smoked. She has never used smokeless tobacco. She reports that she does not drink alcohol or use drugs.  Current Outpatient Prescriptions   Medication Sig Dispense Refill   • AUBAGIO 14 MG tablet      • Dalfampridine ER (AMPYRA) 10 MG tablet sustained-release 12 hour Take 10 mg by mouth 2 (Two) Times a Day. 60 tablet 11   • levETIRAcetam XR (KEPPRA XR) 500 MG 24 hr tablet Take 2 tablets by mouth Daily. 60 tablet 11     No current facility-administered medications for this visit.      Current Outpatient Prescriptions on File Prior to Visit   Medication Sig   • AUBAGIO 14 MG tablet    • Dalfampridine ER (AMPYRA) 10 MG tablet sustained-release 12 hour Take 10 mg by mouth 2 (Two) Times a Day.   • levETIRAcetam XR (KEPPRA XR) 500 MG 24 hr tablet Take 2 tablets by mouth Daily.   • [DISCONTINUED] gabapentin (NEURONTIN) 300 MG capsule Take 1 capsule by mouth 3 (Three) Times a Day.     No current facility-administered medications on file prior to visit.      She is allergic to benadryl [diphenhydramine]..    Review of Systems   Constitutional: Negative for activity change and unexpected weight change.   HENT: Negative for tinnitus and trouble swallowing.    Eyes: Negative for photophobia and visual disturbance.   Respiratory: Negative for apnea and choking.    Cardiovascular: Negative for leg swelling.   Endocrine: Positive for heat intolerance. Negative for cold intolerance.   Genitourinary: Positive for frequency. Negative for difficulty urinating, menstrual problem and urgency.   Musculoskeletal: Positive for gait problem. Negative for back pain.   Skin: Negative for color change.   Allergic/Immunologic: Negative for immunocompromised state.   Neurological: Negative for dizziness, tremors, seizures, syncope, facial asymmetry, speech difficulty and  light-headedness.   Hematological: Negative for adenopathy. Does not bruise/bleed easily.   Psychiatric/Behavioral: Negative for behavioral problems, confusion, decreased concentration, hallucinations and sleep disturbance.        Objective:  Vitals:    18 0813   BP: 104/70   Pulse: 80   Resp: 18   SpO2: 98%   ,  Neurologic Exam     Mental Status   Registration: recalls 3 of 3 objects. Recall at 5 minutes: recalls 3 of 3 objects. Follows 3 step commands.   Attention: normal. Concentration: normal.   Level of consciousness: alert  Knowledge: good and consistent with education.   Able to name object. Able to read. Able to repeat. Able to write. Normal comprehension.     Cranial Nerves     CN II   Visual fields full to confrontation.   Visual acuity: normal  Right visual field deficit: none  Left visual field deficit: none     CN III, IV, VI   Right pupil: Shape: regular. Reactivity: brisk. Consensual response: intact.   Left pupil: Shape: regular. Reactivity: brisk. Consensual response: intact.   Nystagmus: none   Diplopia: none  Ophthalmoparesis: none  Upgaze: normal  Downgaze: normal  Conjugate gaze: present  Vestibulo-ocular reflex: present    CN V   Facial sensation intact.   Right corneal reflex: normal  Left corneal reflex: normal    CN VII   Right facial weakness: none  Left facial weakness: none    CN VIII   Hearing: intact    CN IX, X   Palate: symmetric  Right gag reflex: normal  Left gag reflex: normal    CN XI   Right sternocleidomastoid strength: normal  Left sternocleidomastoid strength: normal    CN XII   Tongue: not atrophic  Fasciculations: absent  Tongue deviation: none    Motor Exam   Muscle bulk: normal  Overall muscle tone: normal  Right arm tone: normal  Left arm tone: normal  Right leg tone: normal  Left leg tone: increased    Strength   Strength 5/5 except as noted.   Left iliopsoas: 5/5  Left quadriceps: 5/5  Left hamstrin/5  Left glutei: 5/5  Left anterior tibial: 4/5  Left  posterior tibial: 4/5  Left peroneal: 4/5  Left gastroc: 4/5    Sensory Exam   Light touch normal.   Vibration normal.   Proprioception normal.   Pinprick normal.         Gait, Coordination, and Reflexes     Gait  Gait: spastic (left leg)    Coordination   Tandem walking coordination: abnormal    Tremor   Resting tremor: absent  Intention tremor: absent  Action tremor: absent    Reflexes   Right brachioradialis: 3+  Left brachioradialis: 3+  Right biceps: 3+  Left biceps: 3+  Right triceps: 3+  Left triceps: 3+  Right patellar: 3+  Left patellar: 4+  Right achilles: 3+  Left achilles: 4+  Right ankle clonus: present  Left ankle clonus: present      Physical Exam   Constitutional: She appears well-developed and well-nourished.   Neurological: She has an abnormal Tandem Gait Test.   Reflex Scores:       Tricep reflexes are 3+ on the right side and 3+ on the left side.       Bicep reflexes are 3+ on the right side and 3+ on the left side.       Brachioradialis reflexes are 3+ on the right side and 3+ on the left side.       Patellar reflexes are 3+ on the right side and 4+ on the left side.       Achilles reflexes are 3+ on the right side and 4+ on the left side.  Nursing note and vitals reviewed.      Assessment/Plan    Problem List Items Addressed This Visit        Nervous and Auditory    MS (multiple sclerosis) - Primary    Current Assessment & Plan     No new clinical or MRI changes on Aubagio     Continue Aubagio and Ampyra                 Musculoskeletal and Integument    Muscle spasticity    Current Assessment & Plan     Left leg spasticity controlled on LVT             Hematopoietic and Hemostatic    Iron deficiency anemia secondary to inadequate dietary iron intake    Current Assessment & Plan     Check Iron studies due to microcytic anemia    Start Fe SO4 325 mg BID          Relevant Orders    Iron    Ferritin    Reticulocytes      Other Visit Diagnoses    None.

## 2018-07-05 RX ORDER — RENAGEL 800 MG/1
TABLET ORAL
Qty: 84 TABLET | Refills: 2 | Status: SHIPPED | OUTPATIENT
Start: 2018-07-05 | End: 2019-04-19 | Stop reason: SDUPTHER

## 2018-08-07 RX ORDER — DALFAMPRIDINE 10 MG/1
TABLET, FILM COATED, EXTENDED RELEASE ORAL
Qty: 180 TABLET | Refills: 2 | Status: SHIPPED | OUTPATIENT
Start: 2018-08-07 | End: 2019-03-20

## 2018-09-13 ENCOUNTER — LAB (OUTPATIENT)
Dept: LAB | Facility: HOSPITAL | Age: 43
End: 2018-09-13

## 2018-09-13 ENCOUNTER — OFFICE VISIT (OUTPATIENT)
Dept: NEUROLOGY | Facility: CLINIC | Age: 43
End: 2018-09-13

## 2018-09-13 VITALS
DIASTOLIC BLOOD PRESSURE: 72 MMHG | OXYGEN SATURATION: 98 % | HEIGHT: 63 IN | HEART RATE: 83 BPM | RESPIRATION RATE: 18 BRPM | SYSTOLIC BLOOD PRESSURE: 106 MMHG | BODY MASS INDEX: 31.54 KG/M2 | WEIGHT: 178 LBS

## 2018-09-13 DIAGNOSIS — M62.838 MUSCLE SPASTICITY: ICD-10-CM

## 2018-09-13 DIAGNOSIS — D50.8 IRON DEFICIENCY ANEMIA SECONDARY TO INADEQUATE DIETARY IRON INTAKE: ICD-10-CM

## 2018-09-13 DIAGNOSIS — G35 MS (MULTIPLE SCLEROSIS) (HCC): Primary | ICD-10-CM

## 2018-09-13 DIAGNOSIS — G35 MS (MULTIPLE SCLEROSIS) (HCC): ICD-10-CM

## 2018-09-13 LAB
ALBUMIN SERPL-MCNC: 4.33 G/DL (ref 3.2–4.8)
ALBUMIN/GLOB SERPL: 1.5 G/DL (ref 1.5–2.5)
ALP SERPL-CCNC: 119 U/L (ref 25–100)
ALT SERPL W P-5'-P-CCNC: 13 U/L (ref 7–40)
ANION GAP SERPL CALCULATED.3IONS-SCNC: 5 MMOL/L (ref 3–11)
AST SERPL-CCNC: 16 U/L (ref 0–33)
BASOPHILS # BLD AUTO: 0.03 10*3/MM3 (ref 0–0.2)
BASOPHILS NFR BLD AUTO: 0.5 % (ref 0–1)
BILIRUB SERPL-MCNC: 0.3 MG/DL (ref 0.3–1.2)
BUN BLD-MCNC: 15 MG/DL (ref 9–23)
BUN/CREAT SERPL: 20.8 (ref 7–25)
CALCIUM SPEC-SCNC: 9.2 MG/DL (ref 8.7–10.4)
CHLORIDE SERPL-SCNC: 104 MMOL/L (ref 99–109)
CO2 SERPL-SCNC: 26 MMOL/L (ref 20–31)
CREAT BLD-MCNC: 0.72 MG/DL (ref 0.6–1.3)
DEPRECATED RDW RBC AUTO: 48.1 FL (ref 37–54)
EOSINOPHIL # BLD AUTO: 0.28 10*3/MM3 (ref 0–0.3)
EOSINOPHIL NFR BLD AUTO: 4.6 % (ref 0–3)
ERYTHROCYTE [DISTWIDTH] IN BLOOD BY AUTOMATED COUNT: 14.1 % (ref 11.3–14.5)
GFR SERPL CREATININE-BSD FRML MDRD: 108 ML/MIN/1.73
GLOBULIN UR ELPH-MCNC: 2.9 GM/DL
GLUCOSE BLD-MCNC: 68 MG/DL (ref 70–100)
HCT VFR BLD AUTO: 37 % (ref 34.5–44)
HGB BLD-MCNC: 11.9 G/DL (ref 11.5–15.5)
IMM GRANULOCYTES # BLD: 0.01 10*3/MM3 (ref 0–0.03)
IMM GRANULOCYTES NFR BLD: 0.2 % (ref 0–0.6)
IRON 24H UR-MRATE: 41 MCG/DL (ref 50–175)
IRON SATN MFR SERPL: 11 % (ref 15–50)
LYMPHOCYTES # BLD AUTO: 3.22 10*3/MM3 (ref 0.6–4.8)
LYMPHOCYTES NFR BLD AUTO: 52.4 % (ref 24–44)
MCH RBC QN AUTO: 29.8 PG (ref 27–31)
MCHC RBC AUTO-ENTMCNC: 32.2 G/DL (ref 32–36)
MCV RBC AUTO: 92.5 FL (ref 80–99)
MONOCYTES # BLD AUTO: 0.5 10*3/MM3 (ref 0–1)
MONOCYTES NFR BLD AUTO: 8.1 % (ref 0–12)
NEUTROPHILS # BLD AUTO: 2.12 10*3/MM3 (ref 1.5–8.3)
NEUTROPHILS NFR BLD AUTO: 34.4 % (ref 41–71)
PLATELET # BLD AUTO: 353 10*3/MM3 (ref 150–450)
PMV BLD AUTO: 10.1 FL (ref 6–12)
POTASSIUM BLD-SCNC: 3.9 MMOL/L (ref 3.5–5.5)
PROT SERPL-MCNC: 7.2 G/DL (ref 5.7–8.2)
RBC # BLD AUTO: 4 10*6/MM3 (ref 3.89–5.14)
SODIUM BLD-SCNC: 135 MMOL/L (ref 132–146)
TIBC SERPL-MCNC: 388 MCG/DL (ref 250–450)
WBC NRBC COR # BLD: 6.15 10*3/MM3 (ref 3.5–10.8)

## 2018-09-13 PROCEDURE — 83540 ASSAY OF IRON: CPT

## 2018-09-13 PROCEDURE — 36415 COLL VENOUS BLD VENIPUNCTURE: CPT

## 2018-09-13 PROCEDURE — 80053 COMPREHEN METABOLIC PANEL: CPT

## 2018-09-13 PROCEDURE — 85025 COMPLETE CBC W/AUTO DIFF WBC: CPT

## 2018-09-13 PROCEDURE — 83550 IRON BINDING TEST: CPT

## 2018-09-13 PROCEDURE — 99214 OFFICE O/P EST MOD 30 MIN: CPT | Performed by: PSYCHIATRY & NEUROLOGY

## 2018-09-13 NOTE — PROGRESS NOTES
Subjective:     Patient ID: Yifan Bowman is a 43 y.o. female.  Chief Complaint   Patient presents with   • Multiple Sclerosis       History of Present Illness     43 y.o. woman returns in follow up for RRMS, spasticity and Iron def anemia. Last visit on 3/14/18 continued Aubagio 14 mg daily, ordered labs, continued GBP, LVT and Ampyra.      MRI Brain/Cervical, 3/7/18, no new/enlarging/enhancing T2 PVWM, stable C3-5 lesion     3/14/18 - Retic 1.85; Iron 38.     RRMS    No new or worsening sx.  Left hip pain varies from 2 - 5 /10 intensity.  Not using AFO on left leg as it makes toes numb.  If misses Ampyra walking worsens and drags left leg.     Fatigue and Heat intolerance are mild.      Continued left 1 - 3 digits decrease to mild numbness.     Ampyra helped walking improve 50 - 60%.       Left leg spasticity    LVT decreasing stiffness in left leg.       Problem history:    Previously dx and followed by Dr Franklin.  7 years ago fell off porch for unknown reasons.  Left hip is painful and weak.  Pain present when walking.  Left weakness has worsened.  Sx started two months after delivery of 4th child. Band like pain and pressure around lower abdomen.  Noticeable fatigue.  Heat increases sleepiness.  Bladder frequency.  Numbness medial left calf and fingertips.  Started on Aubagio without side effects.      Reviewed Dr Franklin's notes:    Presented on 2/7/17 for left LE pain, numbness and weakness for 6 years.  Also, notes intermittent hand numbness.  MRI results below.  Started on Prednisone and Aubagio 7 mg on 5/4/17  Labs - ACE, Lyme, LUCY, CMP, CBC, TSH, CRP reviewed    My review of films:  MRI Brain 2/15/17 3/30/17  Large left anterior CC black hole, 10 to 15 T2 lesions, multiple CC lesions. No evidence of enhancement    MRI C-spine 3/30/17 C4/5 T2 cord signal w/o enhancement    The following portions of the patient's history were reviewed and updated as appropriate:   She  has a past medical history of MS  (multiple sclerosis) (CMS/[facility]) (3/23/2017).  She  has a past surgical history that includes Reduction mammaplasty and  section.  Her family history includes Cancer in her paternal grandfather.  She  reports that she has never smoked. She has never used smokeless tobacco. She reports that she does not drink alcohol or use drugs.  Current Outpatient Prescriptions   Medication Sig Dispense Refill   • AMPYRA 10 MG tablet sustained-release 12 hour TAKE 1 TABLET BY MOUTH EVERY 12 HOURS 180 tablet 2   • AUBAGIO 14 MG tablet TAKE ONE TABLET (14 MG) BY MOUTH ONCE DAILY. MAY BE TAKEN WITH OR WITHOUT FOOD. STORE AT ROOM TEMPERATURE. 84 tablet 2   • levETIRAcetam XR (KEPPRA XR) 500 MG 24 hr tablet Take 2 tablets by mouth Daily. 60 tablet 11     No current facility-administered medications for this visit.      Current Outpatient Prescriptions on File Prior to Visit   Medication Sig   • AMPYRA 10 MG tablet sustained-release 12 hour TAKE 1 TABLET BY MOUTH EVERY 12 HOURS   • AUBAGIO 14 MG tablet TAKE ONE TABLET (14 MG) BY MOUTH ONCE DAILY. MAY BE TAKEN WITH OR WITHOUT FOOD. STORE AT ROOM TEMPERATURE.   • levETIRAcetam XR (KEPPRA XR) 500 MG 24 hr tablet Take 2 tablets by mouth Daily.   • [DISCONTINUED] AUBAGIO 14 MG tablet      No current facility-administered medications on file prior to visit.      She is allergic to benadryl [diphenhydramine]..    Review of Systems   Constitutional: Negative for activity change and unexpected weight change.   HENT: Negative for tinnitus and trouble swallowing.    Eyes: Negative for photophobia and visual disturbance.   Respiratory: Negative for apnea and choking.    Cardiovascular: Negative for leg swelling.   Endocrine: Positive for heat intolerance. Negative for cold intolerance.   Genitourinary: Positive for frequency. Negative for difficulty urinating, menstrual problem and urgency.   Musculoskeletal: Positive for gait problem. Negative for back pain.   Skin: Negative for color  change.   Allergic/Immunologic: Negative for immunocompromised state.   Neurological: Negative for dizziness, tremors, seizures, syncope, facial asymmetry, speech difficulty and light-headedness.   Hematological: Negative for adenopathy. Does not bruise/bleed easily.   Psychiatric/Behavioral: Negative for behavioral problems, confusion, decreased concentration, hallucinations and sleep disturbance.        Objective:  Vitals:    09/13/18 0850   BP: 106/72   Pulse: 83   Resp: 18   SpO2: 98%   ,  Neurologic Exam     Mental Status   Registration: recalls 3 of 3 objects. Recall at 5 minutes: recalls 3 of 3 objects. Follows 3 step commands.   Attention: normal. Concentration: normal.   Level of consciousness: alert  Knowledge: good and consistent with education.   Able to name object. Able to read. Able to repeat. Able to write. Normal comprehension.     Cranial Nerves     CN II   Visual fields full to confrontation.   Visual acuity: normal  Right visual field deficit: none  Left visual field deficit: none     CN III, IV, VI   Right pupil: Shape: regular. Reactivity: brisk. Consensual response: intact.   Left pupil: Shape: regular. Reactivity: brisk. Consensual response: intact.   Nystagmus: none   Diplopia: none  Ophthalmoparesis: none  Upgaze: normal  Downgaze: normal  Conjugate gaze: present  Vestibulo-ocular reflex: present    CN V   Facial sensation intact.   Right corneal reflex: normal  Left corneal reflex: normal    CN VII   Right facial weakness: none  Left facial weakness: none    CN VIII   Hearing: intact    CN IX, X   Palate: symmetric  Right gag reflex: normal  Left gag reflex: normal    CN XI   Right sternocleidomastoid strength: normal  Left sternocleidomastoid strength: normal    CN XII   Tongue: not atrophic  Fasciculations: absent  Tongue deviation: none    Motor Exam   Muscle bulk: normal  Overall muscle tone: normal  Right arm tone: normal  Left arm tone: normal  Right leg tone: normal  Left leg tone:  increased    Strength   Strength 5/5 except as noted.   Left iliopsoas: 5/5  Left quadriceps: 5/5  Left hamstrin/5  Left glutei: 5/5  Left anterior tibial: 4/5  Left posterior tibial: 4/5  Left peroneal: 4/5  Left gastroc: 4/5    Sensory Exam   Light touch normal.   Vibration normal.   Proprioception normal.   Pinprick normal.         Gait, Coordination, and Reflexes     Gait  Gait: spastic (left leg)    Coordination   Tandem walking coordination: abnormal    Tremor   Resting tremor: absent  Intention tremor: absent  Action tremor: absent    Reflexes   Right brachioradialis: 3+  Left brachioradialis: 3+  Right biceps: 3+  Left biceps: 3+  Right triceps: 3+  Left triceps: 3+  Right patellar: 3+  Left patellar: 4+  Right achilles: 3+  Left achilles: 4+  Right ankle clonus: present  Left ankle clonus: present      Physical Exam   Constitutional: She appears well-developed and well-nourished.   Neurological: She has an abnormal Tandem Gait Test.   Reflex Scores:       Tricep reflexes are 3+ on the right side and 3+ on the left side.       Bicep reflexes are 3+ on the right side and 3+ on the left side.       Brachioradialis reflexes are 3+ on the right side and 3+ on the left side.       Patellar reflexes are 3+ on the right side and 4+ on the left side.       Achilles reflexes are 3+ on the right side and 4+ on the left side.  Nursing note and vitals reviewed.      Assessment/Plan    Problem List Items Addressed This Visit        Nervous and Auditory    MS (multiple sclerosis) (CMS/[facility]) - Primary    Current Assessment & Plan     No new or worsening on Aubagio    CBC,CMP, iron                Musculoskeletal and Integument    Muscle spasticity    Current Assessment & Plan     Conitnue Keppra             Hematopoietic and Hemostatic    Iron deficiency anemia secondary to inadequate dietary iron intake    Current Assessment & Plan     Recheck CBC and Iron studies

## 2018-09-14 ENCOUNTER — TELEPHONE (OUTPATIENT)
Dept: NEUROLOGY | Facility: CLINIC | Age: 43
End: 2018-09-14

## 2018-09-14 NOTE — TELEPHONE ENCOUNTER
----- Message from Richard Coronado MD sent at 9/14/2018  8:02 AM EDT -----  Notify patient she is no longer anemic but Iron remains low.  Recommend restarting Iron sulfate 325 mg daily

## 2018-09-26 NOTE — TELEPHONE ENCOUNTER
Called patient, no answer, left message with provider instructions regarding lab results. Have not been able to contact patient.

## 2018-11-02 RX ORDER — LEVETIRACETAM 500 MG/1
TABLET, EXTENDED RELEASE ORAL
Qty: 60 TABLET | Refills: 2 | Status: SHIPPED | OUTPATIENT
Start: 2018-11-02 | End: 2019-01-28 | Stop reason: SDUPTHER

## 2019-01-28 RX ORDER — LEVETIRACETAM 500 MG/1
TABLET, EXTENDED RELEASE ORAL
Qty: 60 TABLET | Refills: 1 | Status: SHIPPED | OUTPATIENT
Start: 2019-01-28 | End: 2019-04-12 | Stop reason: SDUPTHER

## 2019-03-20 ENCOUNTER — SPECIALTY PHARMACY (OUTPATIENT)
Dept: ONCOLOGY | Facility: HOSPITAL | Age: 44
End: 2019-03-20

## 2019-03-20 RX ORDER — DALFAMPRIDINE 10 MG/1
10 TABLET, FILM COATED, EXTENDED RELEASE ORAL 2 TIMES DAILY
Qty: 180 TABLET | Refills: 3 | Status: SHIPPED | OUTPATIENT
Start: 2019-03-20 | End: 2019-12-11 | Stop reason: SDUPTHER

## 2019-04-15 RX ORDER — LEVETIRACETAM 500 MG/1
TABLET, EXTENDED RELEASE ORAL
Qty: 60 TABLET | Refills: 0 | Status: SHIPPED | OUTPATIENT
Start: 2019-04-15 | End: 2019-04-19 | Stop reason: SDUPTHER

## 2019-04-19 ENCOUNTER — SPECIALTY PHARMACY (OUTPATIENT)
Dept: ONCOLOGY | Facility: HOSPITAL | Age: 44
End: 2019-04-19

## 2019-04-19 ENCOUNTER — LAB (OUTPATIENT)
Dept: LAB | Facility: HOSPITAL | Age: 44
End: 2019-04-19

## 2019-04-19 ENCOUNTER — OFFICE VISIT (OUTPATIENT)
Dept: NEUROLOGY | Facility: CLINIC | Age: 44
End: 2019-04-19

## 2019-04-19 VITALS
HEART RATE: 81 BPM | SYSTOLIC BLOOD PRESSURE: 108 MMHG | OXYGEN SATURATION: 99 % | WEIGHT: 181 LBS | RESPIRATION RATE: 18 BRPM | BODY MASS INDEX: 32.07 KG/M2 | DIASTOLIC BLOOD PRESSURE: 78 MMHG | HEIGHT: 63 IN

## 2019-04-19 DIAGNOSIS — G35 MS (MULTIPLE SCLEROSIS) (HCC): Primary | ICD-10-CM

## 2019-04-19 DIAGNOSIS — R26.9 GAIT ABNORMALITY: ICD-10-CM

## 2019-04-19 DIAGNOSIS — D50.8 IRON DEFICIENCY ANEMIA SECONDARY TO INADEQUATE DIETARY IRON INTAKE: ICD-10-CM

## 2019-04-19 DIAGNOSIS — G35 MS (MULTIPLE SCLEROSIS) (HCC): ICD-10-CM

## 2019-04-19 DIAGNOSIS — M62.838 MUSCLE SPASTICITY: ICD-10-CM

## 2019-04-19 LAB
ALBUMIN SERPL-MCNC: 4.1 G/DL (ref 3.5–5.2)
ALBUMIN/GLOB SERPL: 1.1 G/DL
ALP SERPL-CCNC: 92 U/L (ref 39–117)
ALT SERPL W P-5'-P-CCNC: 10 U/L (ref 1–33)
ANION GAP SERPL CALCULATED.3IONS-SCNC: 12.2 MMOL/L
AST SERPL-CCNC: 15 U/L (ref 1–32)
BASOPHILS # BLD AUTO: 0.02 10*3/MM3 (ref 0–0.2)
BASOPHILS NFR BLD AUTO: 0.4 % (ref 0–1.5)
BILIRUB SERPL-MCNC: <0.2 MG/DL (ref 0.2–1.2)
BUN BLD-MCNC: 8 MG/DL (ref 6–20)
BUN/CREAT SERPL: 13.1 (ref 7–25)
CALCIUM SPEC-SCNC: 9.9 MG/DL (ref 8.6–10.5)
CHLORIDE SERPL-SCNC: 96 MMOL/L (ref 98–107)
CO2 SERPL-SCNC: 24.8 MMOL/L (ref 22–29)
CREAT BLD-MCNC: 0.61 MG/DL (ref 0.57–1)
DEPRECATED RDW RBC AUTO: 50.4 FL (ref 37–54)
EOSINOPHIL # BLD AUTO: 0.14 10*3/MM3 (ref 0–0.4)
EOSINOPHIL NFR BLD AUTO: 2.9 % (ref 0.3–6.2)
ERYTHROCYTE [DISTWIDTH] IN BLOOD BY AUTOMATED COUNT: 15.7 % (ref 12.3–15.4)
GFR SERPL CREATININE-BSD FRML MDRD: 131 ML/MIN/1.73
GLOBULIN UR ELPH-MCNC: 3.9 GM/DL
GLUCOSE BLD-MCNC: 69 MG/DL (ref 65–99)
HCT VFR BLD AUTO: 33 % (ref 34–46.6)
HGB BLD-MCNC: 9.9 G/DL (ref 12–15.9)
IMM GRANULOCYTES # BLD AUTO: 0.01 10*3/MM3 (ref 0–0.05)
IMM GRANULOCYTES NFR BLD AUTO: 0.2 % (ref 0–0.5)
LYMPHOCYTES # BLD AUTO: 2.31 10*3/MM3 (ref 0.7–3.1)
LYMPHOCYTES NFR BLD AUTO: 47.6 % (ref 19.6–45.3)
MCH RBC QN AUTO: 26.5 PG (ref 26.6–33)
MCHC RBC AUTO-ENTMCNC: 30 G/DL (ref 31.5–35.7)
MCV RBC AUTO: 88.5 FL (ref 79–97)
MONOCYTES # BLD AUTO: 0.39 10*3/MM3 (ref 0.1–0.9)
MONOCYTES NFR BLD AUTO: 8 % (ref 5–12)
NEUTROPHILS # BLD AUTO: 1.98 10*3/MM3 (ref 1.7–7)
NEUTROPHILS NFR BLD AUTO: 40.9 % (ref 42.7–76)
NRBC BLD AUTO-RTO: 0 /100 WBC (ref 0–0.2)
PLATELET # BLD AUTO: 329 10*3/MM3 (ref 140–450)
PMV BLD AUTO: 9.7 FL (ref 6–12)
POTASSIUM BLD-SCNC: 3.8 MMOL/L (ref 3.5–5.2)
PROT SERPL-MCNC: 8 G/DL (ref 6–8.5)
RBC # BLD AUTO: 3.73 10*6/MM3 (ref 3.77–5.28)
SODIUM BLD-SCNC: 133 MMOL/L (ref 136–145)
WBC NRBC COR # BLD: 4.85 10*3/MM3 (ref 3.4–10.8)

## 2019-04-19 PROCEDURE — 80053 COMPREHEN METABOLIC PANEL: CPT

## 2019-04-19 PROCEDURE — 36415 COLL VENOUS BLD VENIPUNCTURE: CPT

## 2019-04-19 PROCEDURE — 99214 OFFICE O/P EST MOD 30 MIN: CPT | Performed by: PSYCHIATRY & NEUROLOGY

## 2019-04-19 PROCEDURE — 85025 COMPLETE CBC W/AUTO DIFF WBC: CPT

## 2019-04-19 RX ORDER — LEVETIRACETAM 500 MG/1
1000 TABLET, EXTENDED RELEASE ORAL DAILY
Qty: 180 TABLET | Refills: 3 | Status: SHIPPED | OUTPATIENT
Start: 2019-04-19 | End: 2020-03-23

## 2019-04-19 NOTE — PROGRESS NOTES
Subjective:     Patient ID: Yifan Bowman is a 43 y.o. female.  Chief Complaint   Patient presents with   • Multiple Sclerosis       History of Present Illness     43 y.o. woman returns in follow up for RRMS, spasticity and Iron def anemia. Last visit on 9/13/18 continued Aubagio 14 mg daily, ordered labs, continued GBP, LVT and Ampyra.      MRI Brain/Cervical, 3/7/18, no new/enlarging/enhancing T2 PVWM, stable C3-5 lesion     9/13/18 - CBC - NCS, Iron 41, Iron sat 11, TIBC 388    RRMS    MSFC T25FW 7.68 sec, SLS 4 R, 3 L; 9 hole peg 19.5 sec, 22.55, SDMT 47, all scores improved    Needs L knee brace to control hyperextension.    Working 30 hours a week at [employer] and sits 8 hours on weekend.    Left hip pain improved.    Switched to generic Ampyra and had several falls.  Switching back improved walking.     Fatigue is mild.  Heat intolerance are moderate.      Continued left 1 - 3 digits decrease to mild numbness.     Ampyra helped walking improve 50 - 60%.       Left leg spasticity    LVT improving stiffness in left leg.       Problem history:    Previously dx and followed by Dr Franklin.  7 years ago fell off porch for unknown reasons.  Left hip is painful and weak.  Pain present when walking.  Left weakness has worsened.  Sx started two months after delivery of 4th child. Band like pain and pressure around lower abdomen.  Noticeable fatigue.  Heat increases sleepiness.  Bladder frequency.  Numbness medial left calf and fingertips.  Started on Aubagio without side effects.      Reviewed Dr Franklin's notes:    Presented on 2/7/17 for left LE pain, numbness and weakness for 6 years.  Also, notes intermittent hand numbness.  MRI results below.  Started on Prednisone and Aubagio 7 mg on 5/4/17  Labs - ACE, Lyme, LUCY, CMP, CBC, TSH, CRP reviewed    My review of films:  MRI Brain 2/15/17 3/30/17  Large left anterior CC black hole, 10 to 15 T2 lesions, multiple CC lesions. No evidence of  enhancement    MRI C-spine 3/30/17 C4/5 T2 cord signal w/o enhancement    The following portions of the patient's history were reviewed and updated as appropriate:   She  has a past medical history of MS (multiple sclerosis) (CMS/[facility]) (3/23/2017).  She  has a past surgical history that includes Reduction mammaplasty and  section.  Her family history includes Cancer in her paternal grandfather.  She  reports that she has never smoked. She has never used smokeless tobacco. She reports that she does not drink alcohol or use drugs.  Current Outpatient Medications   Medication Sig Dispense Refill   • AMPYRA 10 MG tablet sustained-release 12 hour Take 10 mg by mouth 2 (Two) Times a Day. 180 tablet 3   • AUBAGIO 14 MG tablet TAKE ONE TABLET (14 MG) BY MOUTH ONCE DAILY. MAY BE TAKEN WITH OR WITHOUT FOOD. STORE AT ROOM TEMPERATURE. 84 tablet 2   • levETIRAcetam XR (KEPPRA XR) 500 MG 24 hr tablet Take 2 tablets by mouth Daily. 180 tablet 3     No current facility-administered medications for this visit.      Current Outpatient Medications on File Prior to Visit   Medication Sig   • AMPYRA 10 MG tablet sustained-release 12 hour Take 10 mg by mouth 2 (Two) Times a Day.   • AUBAGIO 14 MG tablet TAKE ONE TABLET (14 MG) BY MOUTH ONCE DAILY. MAY BE TAKEN WITH OR WITHOUT FOOD. STORE AT ROOM TEMPERATURE.   • [DISCONTINUED] levETIRAcetam XR (KEPPRA XR) 500 MG 24 hr tablet TAKE 2 TABLETS BY MOUTH DAILY.     No current facility-administered medications on file prior to visit.      She is allergic to benadryl [diphenhydramine]..    Review of Systems   Constitutional: Positive for fatigue. Negative for activity change and unexpected weight change.   HENT: Negative for tinnitus and trouble swallowing.    Eyes: Negative for photophobia and visual disturbance.   Respiratory: Negative for apnea and choking.    Cardiovascular: Negative for leg swelling.   Endocrine: Positive for heat intolerance. Negative for cold intolerance.    Genitourinary: Positive for frequency. Negative for difficulty urinating, menstrual problem and urgency.   Musculoskeletal: Positive for gait problem. Negative for back pain.   Skin: Negative for color change.   Allergic/Immunologic: Negative for immunocompromised state.   Neurological: Positive for weakness and numbness. Negative for dizziness, tremors, seizures, syncope, facial asymmetry, speech difficulty and light-headedness.   Hematological: Negative for adenopathy. Does not bruise/bleed easily.   Psychiatric/Behavioral: Negative for behavioral problems, confusion, decreased concentration, hallucinations and sleep disturbance.        Objective:  Vitals:    04/19/19 0828   BP: 108/78   Pulse: 81   Resp: 18   SpO2: 99%   ,  Neurologic Exam     Mental Status   Registration: recalls 3 of 3 objects. Recall at 5 minutes: recalls 3 of 3 objects. Follows 3 step commands.   Attention: normal. Concentration: normal.   Level of consciousness: alert  Knowledge: good and consistent with education.   Able to name object. Able to read. Able to repeat. Able to write. Normal comprehension.     Cranial Nerves     CN II   Visual fields full to confrontation.   Visual acuity: normal  Right visual field deficit: none  Left visual field deficit: none     CN III, IV, VI   Right pupil: Shape: regular. Reactivity: brisk. Consensual response: intact.   Left pupil: Shape: regular. Reactivity: brisk. Consensual response: intact.   Nystagmus: none   Diplopia: none  Ophthalmoparesis: none  Upgaze: normal  Downgaze: normal  Conjugate gaze: present  Vestibulo-ocular reflex: present    CN V   Facial sensation intact.   Right corneal reflex: normal  Left corneal reflex: normal    CN VII   Right facial weakness: none  Left facial weakness: none    CN VIII   Hearing: intact    CN IX, X   Palate: symmetric  Right gag reflex: normal  Left gag reflex: normal    CN XI   Right sternocleidomastoid strength: normal  Left sternocleidomastoid strength:  normal    CN XII   Tongue: not atrophic  Fasciculations: absent  Tongue deviation: none    Motor Exam   Muscle bulk: normal  Overall muscle tone: normal  Right arm tone: normal  Left arm tone: normal  Right leg tone: normal  Left leg tone: increased    Strength   Strength 5/5 except as noted.   Left iliopsoas: 5/5  Left quadriceps: 5/5  Left hamstrin/5  Left glutei: 5/5  Left anterior tibial: 4/5  Left posterior tibial: 4/5  Left peroneal: 4/5  Left gastroc: 4/5    Sensory Exam   Light touch normal.   Vibration normal.   Proprioception normal.   Pinprick normal.         Gait, Coordination, and Reflexes     Gait  Gait: spastic (left leg)    Coordination   Tandem walking coordination: abnormal    Tremor   Resting tremor: absent  Intention tremor: absent  Action tremor: absent    Reflexes   Right brachioradialis: 3+  Left brachioradialis: 3+  Right biceps: 3+  Left biceps: 3+  Right triceps: 3+  Left triceps: 3+  Right patellar: 3+  Left patellar: 4+  Right achilles: 3+  Left achilles: 4+  Right ankle clonus: present  Left ankle clonus: present      Physical Exam   Constitutional: She appears well-developed and well-nourished.   Neurological: She has an abnormal Tandem Gait Test.   Reflex Scores:       Tricep reflexes are 3+ on the right side and 3+ on the left side.       Bicep reflexes are 3+ on the right side and 3+ on the left side.       Brachioradialis reflexes are 3+ on the right side and 3+ on the left side.       Patellar reflexes are 3+ on the right side and 4+ on the left side.       Achilles reflexes are 3+ on the right side and 4+ on the left side.  Nursing note and vitals reviewed.      Assessment/Plan    Problem List Items Addressed This Visit        Nervous and Auditory    MS (multiple sclerosis) (CMS/[facility]) - Primary    Current Assessment & Plan     MSFC all sx improved since 17    MRI Brain     CBC, CMP          Relevant Orders    CBC & Differential    Comprehensive Metabolic Panel    MRI Brain  With & Without Contrast       Musculoskeletal and Integument    Muscle spasticity    Current Assessment & Plan     Continue Keppra             Hematopoietic and Hemostatic    Iron deficiency anemia secondary to inadequate dietary iron intake    Current Assessment & Plan     Start Fe sulfate 325 mg daily             Other    Gait abnormality    Current Assessment & Plan      Walking improved on Ampyra brand name

## 2019-04-22 ENCOUNTER — TELEPHONE (OUTPATIENT)
Dept: NEUROLOGY | Facility: CLINIC | Age: 44
End: 2019-04-22

## 2019-04-22 NOTE — TELEPHONE ENCOUNTER
----- Message from Richard Coronado MD sent at 4/22/2019  9:24 AM EDT -----  Pt is need to start Iron she is anemic again

## 2019-04-22 NOTE — TELEPHONE ENCOUNTER
Patient called back, spoke with her, advised her of the results and provider instructions. she verbalized understanding.

## 2019-04-25 ENCOUNTER — HOSPITAL ENCOUNTER (OUTPATIENT)
Dept: MRI IMAGING | Facility: HOSPITAL | Age: 44
Discharge: HOME | End: 2019-04-25
Admitting: PSYCHIATRY & NEUROLOGY

## 2019-04-25 DIAGNOSIS — G35 MS (MULTIPLE SCLEROSIS) (HCC): ICD-10-CM

## 2019-04-25 PROCEDURE — 0 GADOBENATE DIMEGLUMINE 529 MG/ML SOLUTION: Performed by: PSYCHIATRY & NEUROLOGY

## 2019-04-25 PROCEDURE — A9577 INJ MULTIHANCE: HCPCS | Performed by: PSYCHIATRY & NEUROLOGY

## 2019-04-25 PROCEDURE — 70553 MRI BRAIN STEM W/O & W/DYE: CPT

## 2019-04-25 RX ADMIN — GADOBENATE DIMEGLUMINE 15 ML: 529 INJECTION, SOLUTION INTRAVENOUS at 16:12

## 2019-05-08 ENCOUNTER — OFFICE VISIT (OUTPATIENT)
Dept: NEUROLOGY | Facility: CLINIC | Age: 44
End: 2019-05-08

## 2019-05-08 VITALS
HEART RATE: 86 BPM | OXYGEN SATURATION: 98 % | DIASTOLIC BLOOD PRESSURE: 80 MMHG | SYSTOLIC BLOOD PRESSURE: 108 MMHG | HEIGHT: 63 IN | RESPIRATION RATE: 16 BRPM | WEIGHT: 181 LBS | BODY MASS INDEX: 32.07 KG/M2

## 2019-05-08 DIAGNOSIS — R26.9 GAIT ABNORMALITY: ICD-10-CM

## 2019-05-08 DIAGNOSIS — M62.838 MUSCLE SPASTICITY: ICD-10-CM

## 2019-05-08 DIAGNOSIS — G35 MS (MULTIPLE SCLEROSIS) (HCC): Primary | ICD-10-CM

## 2019-05-08 DIAGNOSIS — D50.8 IRON DEFICIENCY ANEMIA SECONDARY TO INADEQUATE DIETARY IRON INTAKE: ICD-10-CM

## 2019-05-08 PROCEDURE — 99214 OFFICE O/P EST MOD 30 MIN: CPT | Performed by: PSYCHIATRY & NEUROLOGY

## 2019-05-08 RX ORDER — FERROUS SULFATE 325(65) MG
325 TABLET ORAL
COMMUNITY
End: 2021-02-12

## 2019-05-08 NOTE — ASSESSMENT & PLAN NOTE
MRI Brain no new or worsening sx    Added Fe for iron deficiency anemia    Continue Aubagio

## 2019-05-08 NOTE — PROGRESS NOTES
Subjective:     Patient ID: Yifan Bowman is a 44 y.o. female.  Chief Complaint   Patient presents with   • Multiple Sclerosis       History of Present Illness     44 y.o. woman returns in follow up for RRMS, spasticity and Iron def anemia. Last visit on 4/19/19 continued Aubagio 14 mg daily, ordered MRI Brain and labs, continued GBP, LVT and Ampyra, started Iron.      MRI Brain/Cervical, my review of films, 4/25/19 as compared to 3/7/18, no new/enlarging/enhancing T2 PVWM, mild/mod     4/19/19 -  Hgb 9.9, HCT 33, restarted Iron     RRMS    MSFC T25FW 7.68 sec, SLS 4 R, 3 L; 9 hole peg 19.5 sec, 22.55, SDMT 47, all scores improved    Did not make appt for L knee brace to control hyperextension.     Left hip pain resolved. .     Brand Ampyra  improved walking.     Fatigue is mild.  Heat intolerance are moderate.      Continued left 1 - 3 digits decrease to mild numbness.          Left leg spasticity    LVT improving stiffness in left leg.       Problem history:    Previously dx and followed by Dr Franklin.  7 years ago fell off porch for unknown reasons.  Left hip is painful and weak.  Pain present when walking.  Left weakness has worsened.  Sx started two months after delivery of 4th child. Band like pain and pressure around lower abdomen.  Noticeable fatigue.  Heat increases sleepiness.  Bladder frequency.  Numbness medial left calf and fingertips.  Started on Aubagio without side effects.      Reviewed Dr Franklin's notes:    Presented on 2/7/17 for left LE pain, numbness and weakness for 6 years.  Also, notes intermittent hand numbness.  MRI results below.  Started on Prednisone and Aubagio 7 mg on 5/4/17  Labs - ACE, Lyme, LUCY, CMP, CBC, TSH, CRP reviewed    My review of films:  MRI Brain 2/15/17 3/30/17  Large left anterior CC black hole, 10 to 15 T2 lesions, multiple CC lesions. No evidence of enhancement    MRI C-spine 3/30/17 C4/5 T2 cord signal w/o enhancement    The following portions of the patient's  history were reviewed and updated as appropriate:   She  has a past medical history of MS (multiple sclerosis) (CMS/[facility]) (3/23/2017).  She  has a past surgical history that includes Reduction mammaplasty and  section.  Her family history includes Cancer in her paternal grandfather.  She  reports that she has never smoked. She has never used smokeless tobacco. She reports that she does not drink alcohol or use drugs.  Current Outpatient Medications   Medication Sig Dispense Refill   • AMPYRA 10 MG tablet sustained-release 12 hour Take 10 mg by mouth 2 (Two) Times a Day. 180 tablet 3   • ferrous sulfate 325 (65 FE) MG tablet Take 325 mg by mouth Daily With Breakfast.     • levETIRAcetam XR (KEPPRA XR) 500 MG 24 hr tablet Take 2 tablets by mouth Daily. 180 tablet 3   • Teriflunomide (AUBAGIO) 14 MG tablet Take 14 mg by mouth Daily. 90 tablet 3     No current facility-administered medications for this visit.      Current Outpatient Medications on File Prior to Visit   Medication Sig   • AMPYRA 10 MG tablet sustained-release 12 hour Take 10 mg by mouth 2 (Two) Times a Day.   • ferrous sulfate 325 (65 FE) MG tablet Take 325 mg by mouth Daily With Breakfast.   • levETIRAcetam XR (KEPPRA XR) 500 MG 24 hr tablet Take 2 tablets by mouth Daily.   • Teriflunomide (AUBAGIO) 14 MG tablet Take 14 mg by mouth Daily.     No current facility-administered medications on file prior to visit.      She is allergic to benadryl [diphenhydramine]..    Review of Systems   Constitutional: Positive for fatigue. Negative for activity change and unexpected weight change.   HENT: Negative for tinnitus and trouble swallowing.    Eyes: Negative for photophobia and visual disturbance.   Respiratory: Negative for apnea and choking.    Cardiovascular: Negative for leg swelling.   Endocrine: Positive for heat intolerance. Negative for cold intolerance.   Genitourinary: Positive for frequency. Negative for difficulty urinating, menstrual problem  and urgency.   Musculoskeletal: Positive for gait problem. Negative for back pain.   Skin: Negative for color change.   Allergic/Immunologic: Negative for immunocompromised state.   Neurological: Positive for weakness and numbness. Negative for dizziness, tremors, seizures, syncope, facial asymmetry, speech difficulty and light-headedness.   Hematological: Negative for adenopathy. Does not bruise/bleed easily.   Psychiatric/Behavioral: Negative for behavioral problems, confusion, decreased concentration, hallucinations and sleep disturbance.        Objective:  Vitals:    05/08/19 0856   BP: 108/80   Pulse: 86   Resp: 16   SpO2: 98%   ,  Neurologic Exam     Mental Status   Registration: recalls 3 of 3 objects. Recall at 5 minutes: recalls 3 of 3 objects. Follows 3 step commands.   Attention: normal. Concentration: normal.   Level of consciousness: alert  Knowledge: good and consistent with education.   Able to name object. Able to read. Able to repeat. Able to write. Normal comprehension.     Cranial Nerves     CN II   Visual fields full to confrontation.   Visual acuity: normal  Right visual field deficit: none  Left visual field deficit: none     CN III, IV, VI   Right pupil: Shape: regular. Reactivity: brisk. Consensual response: intact.   Left pupil: Shape: regular. Reactivity: brisk. Consensual response: intact.   Nystagmus: none   Diplopia: none  Ophthalmoparesis: none  Upgaze: normal  Downgaze: normal  Conjugate gaze: present  Vestibulo-ocular reflex: present    CN V   Facial sensation intact.   Right corneal reflex: normal  Left corneal reflex: normal    CN VII   Right facial weakness: none  Left facial weakness: none    CN VIII   Hearing: intact    CN IX, X   Palate: symmetric  Right gag reflex: normal  Left gag reflex: normal    CN XI   Right sternocleidomastoid strength: normal  Left sternocleidomastoid strength: normal    CN XII   Tongue: not atrophic  Fasciculations: absent  Tongue deviation:  none    Motor Exam   Muscle bulk: normal  Overall muscle tone: normal  Right arm tone: normal  Left arm tone: normal  Right leg tone: normal  Left leg tone: increased    Strength   Strength 5/5 except as noted.   Left iliopsoas: 5/5  Left quadriceps: 5/5  Left hamstrin/5  Left glutei: 5/5  Left anterior tibial: 4/5  Left posterior tibial: 4/5  Left peroneal: 4/5  Left gastroc: 4/5    Sensory Exam   Light touch normal.   Vibration normal.   Proprioception normal.   Pinprick normal.         Gait, Coordination, and Reflexes     Gait  Gait: spastic (left leg)    Coordination   Tandem walking coordination: abnormal    Tremor   Resting tremor: absent  Intention tremor: absent  Action tremor: absent    Reflexes   Right brachioradialis: 3+  Left brachioradialis: 3+  Right biceps: 3+  Left biceps: 3+  Right triceps: 3+  Left triceps: 3+  Right patellar: 3+  Left patellar: 4+  Right achilles: 3+  Left achilles: 4+  Right ankle clonus: present  Left ankle clonus: present      Physical Exam   Constitutional: She appears well-developed and well-nourished.   Neurological: She has an abnormal Tandem Gait Test.   Reflex Scores:       Tricep reflexes are 3+ on the right side and 3+ on the left side.       Bicep reflexes are 3+ on the right side and 3+ on the left side.       Brachioradialis reflexes are 3+ on the right side and 3+ on the left side.       Patellar reflexes are 3+ on the right side and 4+ on the left side.       Achilles reflexes are 3+ on the right side and 4+ on the left side.  Nursing note and vitals reviewed.      Assessment/Plan    Problem List Items Addressed This Visit        Nervous and Auditory    MS (multiple sclerosis) (CMS/[facility]) - Primary    Current Assessment & Plan     MRI Brain no new or worsening sx    Added Fe for iron deficiency anemia    Continue Aubagio                     Musculoskeletal and Integument    Muscle spasticity    Current Assessment & Plan     Sx stable on Keppra              Hematopoietic and Hemostatic    Iron deficiency anemia secondary to inadequate dietary iron intake    Relevant Medications    ferrous sulfate 325 (65 FE) MG tablet       Other    Gait abnormality    Current Assessment & Plan     Continue Ampyra

## 2019-07-18 ENCOUNTER — SPECIALTY PHARMACY (OUTPATIENT)
Dept: ONCOLOGY | Facility: HOSPITAL | Age: 44
End: 2019-07-18

## 2019-09-18 ENCOUNTER — SPECIALTY PHARMACY (OUTPATIENT)
Dept: PHARMACY | Facility: HOSPITAL | Age: 44
End: 2019-09-18

## 2019-09-19 ENCOUNTER — HOSPITAL ENCOUNTER (EMERGENCY)
Facility: HOSPITAL | Age: 44
Discharge: HOME | End: 2019-09-20
Attending: EMERGENCY MEDICINE | Admitting: EMERGENCY MEDICINE

## 2019-09-19 VITALS
TEMPERATURE: 98.3 F | DIASTOLIC BLOOD PRESSURE: 75 MMHG | SYSTOLIC BLOOD PRESSURE: 119 MMHG | HEIGHT: 62 IN | BODY MASS INDEX: 33.13 KG/M2 | WEIGHT: 180 LBS | HEART RATE: 93 BPM | RESPIRATION RATE: 14 BRPM | OXYGEN SATURATION: 98 %

## 2019-09-19 DIAGNOSIS — S16.1XXA STRAIN OF NECK MUSCLE, INITIAL ENCOUNTER: Primary | ICD-10-CM
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
## 2019-09-19 DIAGNOSIS — S29.019A THORACIC MYOFASCIAL STRAIN, INITIAL ENCOUNTER: ICD-10-CM

## 2019-09-19 PROCEDURE — 99283 EMERGENCY DEPT VISIT LOW MDM: CPT

## 2019-09-19 RX ORDER — KETOROLAC TROMETHAMINE 30 MG/ML
30 INJECTION, SOLUTION INTRAMUSCULAR; INTRAVENOUS ONCE
Status: COMPLETED | OUTPATIENT
Start: 2019-09-19 | End: 2019-09-20

## 2019-09-19 RX ORDER — CYCLOBENZAPRINE HCL 10 MG
10 TABLET ORAL ONCE
Status: COMPLETED | OUTPATIENT
Start: 2019-09-19 | End: 2019-09-20

## 2019-09-20 ENCOUNTER — APPOINTMENT (OUTPATIENT)
Dept: GENERAL RADIOLOGY | Facility: HOSPITAL | Age: 44
End: 2019-09-20

## 2019-09-20 PROCEDURE — 72072 X-RAY EXAM THORAC SPINE 3VWS: CPT

## 2019-09-20 PROCEDURE — 72040 X-RAY EXAM NECK SPINE 2-3 VW: CPT

## 2019-09-20 PROCEDURE — 25010000002 KETOROLAC TROMETHAMINE PER 15 MG: Performed by: NURSE PRACTITIONER

## 2019-09-20 PROCEDURE — 96372 THER/PROPH/DIAG INJ SC/IM: CPT

## 2019-09-20 RX ORDER — CYCLOBENZAPRINE HCL 5 MG
5 TABLET ORAL 3 TIMES DAILY PRN
Qty: 15 TABLET | Refills: 0 | OUTPATIENT
Start: 2019-09-20 | End: 2020-02-27

## 2019-09-20 RX ORDER — IBUPROFEN 600 MG/1
600 TABLET ORAL EVERY 6 HOURS PRN
Qty: 24 TABLET | Refills: 0 | OUTPATIENT
Start: 2019-09-20 | End: 2020-02-27

## 2019-09-20 RX ADMIN — CYCLOBENZAPRINE HYDROCHLORIDE 10 MG: 10 TABLET, FILM COATED ORAL at 00:06

## 2019-09-20 RX ADMIN — KETOROLAC TROMETHAMINE 30 MG: 30 INJECTION, SOLUTION INTRAMUSCULAR at 00:07
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
## 2019-11-07 ENCOUNTER — LAB (OUTPATIENT)
Dept: LAB | Facility: HOSPITAL | Age: 44
End: 2019-11-07

## 2019-11-07 ENCOUNTER — OFFICE VISIT (OUTPATIENT)
Dept: NEUROLOGY | Facility: CLINIC | Age: 44
End: 2019-11-07

## 2019-11-07 VITALS
OXYGEN SATURATION: 98 % | BODY MASS INDEX: 34.82 KG/M2 | DIASTOLIC BLOOD PRESSURE: 90 MMHG | SYSTOLIC BLOOD PRESSURE: 132 MMHG | HEART RATE: 107 BPM | WEIGHT: 189.2 LBS | HEIGHT: 62 IN | RESPIRATION RATE: 16 BRPM

## 2019-11-07 DIAGNOSIS — R26.9 GAIT ABNORMALITY: ICD-10-CM

## 2019-11-07 DIAGNOSIS — G35 MS (MULTIPLE SCLEROSIS) (HCC): Primary | ICD-10-CM

## 2019-11-07 DIAGNOSIS — M62.838 MUSCLE SPASTICITY: ICD-10-CM

## 2019-11-07 DIAGNOSIS — G35 MS (MULTIPLE SCLEROSIS) (HCC): ICD-10-CM

## 2019-11-07 PROBLEM — M25.552 LEFT HIP PAIN: Status: RESOLVED | Noted: 2017-03-23 | Resolved: 2019-11-07

## 2019-11-07 LAB
ALBUMIN SERPL-MCNC: 3.8 G/DL (ref 3.5–5.2)
ALBUMIN/GLOB SERPL: 0.9 G/DL
ALP SERPL-CCNC: 101 U/L (ref 39–117)
ALT SERPL W P-5'-P-CCNC: 11 U/L (ref 1–33)
ANION GAP SERPL CALCULATED.3IONS-SCNC: 6.1 MMOL/L (ref 5–15)
AST SERPL-CCNC: 15 U/L (ref 1–32)
BASOPHILS # BLD AUTO: 0.03 10*3/MM3 (ref 0–0.2)
BASOPHILS NFR BLD AUTO: 0.6 % (ref 0–1.5)
BILIRUB SERPL-MCNC: 0.3 MG/DL (ref 0.2–1.2)
BUN BLD-MCNC: 8 MG/DL (ref 6–20)
BUN/CREAT SERPL: 11.8 (ref 7–25)
CALCIUM SPEC-SCNC: 9.2 MG/DL (ref 8.6–10.5)
CHLORIDE SERPL-SCNC: 101 MMOL/L (ref 98–107)
CO2 SERPL-SCNC: 29.9 MMOL/L (ref 22–29)
CREAT BLD-MCNC: 0.68 MG/DL (ref 0.57–1)
DEPRECATED RDW RBC AUTO: 47 FL (ref 37–54)
EOSINOPHIL # BLD AUTO: 0.21 10*3/MM3 (ref 0–0.4)
EOSINOPHIL NFR BLD AUTO: 3.9 % (ref 0.3–6.2)
ERYTHROCYTE [DISTWIDTH] IN BLOOD BY AUTOMATED COUNT: 14.2 % (ref 12.3–15.4)
GFR SERPL CREATININE-BSD FRML MDRD: 115 ML/MIN/1.73
GLOBULIN UR ELPH-MCNC: 4.1 GM/DL
GLUCOSE BLD-MCNC: 78 MG/DL (ref 65–99)
HCT VFR BLD AUTO: 34.1 % (ref 34–46.6)
HGB BLD-MCNC: 11.6 G/DL (ref 12–15.9)
IMM GRANULOCYTES # BLD AUTO: 0.02 10*3/MM3 (ref 0–0.05)
IMM GRANULOCYTES NFR BLD AUTO: 0.4 % (ref 0–0.5)
LYMPHOCYTES # BLD AUTO: 2.25 10*3/MM3 (ref 0.7–3.1)
LYMPHOCYTES NFR BLD AUTO: 41.6 % (ref 19.6–45.3)
MCH RBC QN AUTO: 30.9 PG (ref 26.6–33)
MCHC RBC AUTO-ENTMCNC: 34 G/DL (ref 31.5–35.7)
MCV RBC AUTO: 90.9 FL (ref 79–97)
MONOCYTES # BLD AUTO: 0.48 10*3/MM3 (ref 0.1–0.9)
MONOCYTES NFR BLD AUTO: 8.9 % (ref 5–12)
NEUTROPHILS # BLD AUTO: 2.42 10*3/MM3 (ref 1.7–7)
NEUTROPHILS NFR BLD AUTO: 44.6 % (ref 42.7–76)
NRBC BLD AUTO-RTO: 0 /100 WBC (ref 0–0.2)
PLATELET # BLD AUTO: 328 10*3/MM3 (ref 140–450)
PMV BLD AUTO: 9.9 FL (ref 6–12)
POTASSIUM BLD-SCNC: 3.6 MMOL/L (ref 3.5–5.2)
PROT SERPL-MCNC: 7.9 G/DL (ref 6–8.5)
RBC # BLD AUTO: 3.75 10*6/MM3 (ref 3.77–5.28)
SODIUM BLD-SCNC: 137 MMOL/L (ref 136–145)
WBC NRBC COR # BLD: 5.41 10*3/MM3 (ref 3.4–10.8)

## 2019-11-07 PROCEDURE — 85025 COMPLETE CBC W/AUTO DIFF WBC: CPT

## 2019-11-07 PROCEDURE — 80053 COMPREHEN METABOLIC PANEL: CPT

## 2019-11-07 PROCEDURE — 99214 OFFICE O/P EST MOD 30 MIN: CPT | Performed by: PSYCHIATRY & NEUROLOGY

## 2019-11-07 PROCEDURE — 36415 COLL VENOUS BLD VENIPUNCTURE: CPT

## 2019-11-07 NOTE — PROGRESS NOTES
Subjective:     Patient ID: Yifan Bowman is a 44 y.o. female.  Chief Complaint   Patient presents with   • Multiple Sclerosis       History of Present Illness     44 y.o. woman returns in follow up for RRMS, spasticity and Iron def anemia. Last visit on 5/8/19 continued Aubagio 14 mg daily, ordered MRI Brain and labs, continued GBP, LVT and Ampyra, started Iron.      MRI Brain/Cervical 4/25/19 as compared to 3/7/18, no new/enlarging/enhancing T2 PVWM, mild/mod     4/19/19 -  Hgb 9.9, HCT 33, restarted Iron     RRMS     Not wearing L AFO due to fit issues. T25FW 7.88 sec    Brand Ampyra improved walking.     Fatigue is mild.  Heat intolerance are moderate.      Continued left 1 - 3 digits decrease to mild numbness.      Left leg spasticity    LVT stable spasticity  stiffness in left leg.       Problem history:    Previously dx and followed by Dr Franklin.  7 years ago fell off porch for unknown reasons.  Left hip is painful and weak.  Pain present when walking.  Left weakness has worsened.  Sx started two months after delivery of 4th child. Band like pain and pressure around lower abdomen.  Noticeable fatigue.  Heat increases sleepiness.  Bladder frequency.  Numbness medial left calf and fingertips.  Started on Aubagio without side effects.      Reviewed Dr Franklin's notes:    Presented on 2/7/17 for left LE pain, numbness and weakness for 6 years.  Also, notes intermittent hand numbness.  MRI results below.  Started on Prednisone and Aubagio 7 mg on 5/4/17  Labs - ACE, Lyme, LUCY, CMP, CBC, TSH, CRP reviewed    My review of films:  MRI Brain 2/15/17 3/30/17  Large left anterior CC black hole, 10 to 15 T2 lesions, multiple CC lesions. No evidence of enhancement    MRI C-spine 3/30/17 C4/5 T2 cord signal w/o enhancement    The following portions of the patient's history were reviewed and updated as appropriate:   She  has a past medical history of MS (multiple sclerosis) (CMS/[facility]) (3/23/2017).  She  has a past  surgical history that includes Reduction mammaplasty and  section.  Her family history includes Cancer in her paternal grandfather.  She  reports that she has never smoked. She has never used smokeless tobacco. She reports that she does not drink alcohol or use drugs.  Current Outpatient Medications   Medication Sig Dispense Refill   • AMPYRA 10 MG tablet sustained-release 12 hour Take 10 mg by mouth 2 (Two) Times a Day. 180 tablet 3   • cyclobenzaprine (FLEXERIL) 5 MG tablet Take 1 tablet by mouth 3 (Three) Times a Day As Needed for Muscle Spasms. 15 tablet 0   • levETIRAcetam XR (KEPPRA XR) 500 MG 24 hr tablet Take 2 tablets by mouth Daily. 180 tablet 3   • Teriflunomide (AUBAGIO) 14 MG tablet Take 14 mg by mouth Daily. 90 tablet 3   • ferrous sulfate 325 (65 FE) MG tablet Take 325 mg by mouth Daily With Breakfast.     • ibuprofen (ADVIL,MOTRIN) 600 MG tablet Take 1 tablet by mouth Every 6 (Six) Hours As Needed for Moderate Pain . 24 tablet 0     No current facility-administered medications for this visit.      Current Outpatient Medications on File Prior to Visit   Medication Sig   • AMPYRA 10 MG tablet sustained-release 12 hour Take 10 mg by mouth 2 (Two) Times a Day.   • cyclobenzaprine (FLEXERIL) 5 MG tablet Take 1 tablet by mouth 3 (Three) Times a Day As Needed for Muscle Spasms.   • levETIRAcetam XR (KEPPRA XR) 500 MG 24 hr tablet Take 2 tablets by mouth Daily.   • Teriflunomide (AUBAGIO) 14 MG tablet Take 14 mg by mouth Daily.   • ferrous sulfate 325 (65 FE) MG tablet Take 325 mg by mouth Daily With Breakfast.   • ibuprofen (ADVIL,MOTRIN) 600 MG tablet Take 1 tablet by mouth Every 6 (Six) Hours As Needed for Moderate Pain .     No current facility-administered medications on file prior to visit.      She is allergic to benadryl [diphenhydramine]..    Review of Systems   Constitutional: Negative for activity change and unexpected weight change.   HENT: Negative for tinnitus and trouble swallowing.     Eyes: Negative for photophobia and visual disturbance.   Respiratory: Negative for apnea and choking.    Cardiovascular: Negative for leg swelling.   Endocrine: Positive for heat intolerance. Negative for cold intolerance.   Genitourinary: Positive for frequency. Negative for difficulty urinating, menstrual problem and urgency.   Musculoskeletal: Positive for gait problem. Negative for back pain.   Skin: Negative for color change.   Allergic/Immunologic: Negative for immunocompromised state.   Neurological: Negative for dizziness, tremors, seizures, syncope, facial asymmetry, speech difficulty and light-headedness.   Hematological: Negative for adenopathy. Does not bruise/bleed easily.   Psychiatric/Behavioral: Negative for behavioral problems, confusion, decreased concentration, hallucinations and sleep disturbance.        Objective:  Vitals:    11/07/19 0921   BP: 132/90   Pulse: 107   Resp: 16   SpO2: 98%   ,  Neurologic Exam     Mental Status   Registration: recalls 3 of 3 objects. Recall at 5 minutes: recalls 3 of 3 objects. Follows 3 step commands.   Attention: normal. Concentration: normal.   Level of consciousness: alert  Knowledge: good and consistent with education.   Able to name object. Able to read. Able to repeat. Able to write. Normal comprehension.     Cranial Nerves     CN II   Visual fields full to confrontation.   Visual acuity: normal  Right visual field deficit: none  Left visual field deficit: none     CN III, IV, VI   Right pupil: Shape: regular. Reactivity: brisk. Consensual response: intact.   Left pupil: Shape: regular. Reactivity: brisk. Consensual response: intact.   Nystagmus: none   Diplopia: none  Ophthalmoparesis: none  Upgaze: normal  Downgaze: normal  Conjugate gaze: present  Vestibulo-ocular reflex: present    CN V   Facial sensation intact.   Right corneal reflex: normal  Left corneal reflex: normal    CN VII   Right facial weakness: none  Left facial weakness: none    CN VIII    Hearing: intact    CN IX, X   Palate: symmetric  Right gag reflex: normal  Left gag reflex: normal    CN XI   Right sternocleidomastoid strength: normal  Left sternocleidomastoid strength: normal    CN XII   Tongue: not atrophic  Fasciculations: absent  Tongue deviation: none    Motor Exam   Muscle bulk: normal  Overall muscle tone: normal  Right arm tone: normal  Left arm tone: normal  Right leg tone: normal  Left leg tone: increased    Strength   Strength 5/5 except as noted.   Left iliopsoas: 5/5  Left quadriceps: 5/5  Left hamstrin/5  Left glutei: 5/5  Left anterior tibial: 4/5  Left posterior tibial: 4/5  Left peroneal: 4/5  Left gastroc: 4/5    Sensory Exam   Light touch normal.   Vibration normal.   Proprioception normal.   Pinprick normal.         Gait, Coordination, and Reflexes     Gait  Gait: spastic (left leg)    Coordination   Tandem walking coordination: abnormal    Tremor   Resting tremor: absent  Intention tremor: absent  Action tremor: absent    Reflexes   Right brachioradialis: 3+  Left brachioradialis: 3+  Right biceps: 3+  Left biceps: 3+  Right triceps: 3+  Left triceps: 3+  Right patellar: 3+  Left patellar: 4+  Right achilles: 3+  Left achilles: 4+  Right ankle clonus: present  Left ankle clonus: present      Physical Exam   Constitutional: She appears well-developed and well-nourished.   Neurological: She has an abnormal Tandem Gait Test.   Reflex Scores:       Tricep reflexes are 3+ on the right side and 3+ on the left side.       Bicep reflexes are 3+ on the right side and 3+ on the left side.       Brachioradialis reflexes are 3+ on the right side and 3+ on the left side.       Patellar reflexes are 3+ on the right side and 4+ on the left side.       Achilles reflexes are 3+ on the right side and 4+ on the left side.  Nursing note and vitals reviewed.      Assessment/Plan    Problem List Items Addressed This Visit        Nervous and Auditory    MS (multiple sclerosis) (CMS/[facility]) -  Primary    Current Assessment & Plan     No new or worsening on Aubagio    CBC,CMP           Relevant Orders    CBC & Differential    Comprehensive Metabolic Panel       Musculoskeletal and Integument    Muscle spasticity    Current Assessment & Plan     Continue Keppra             Other    Gait abnormality    Current Assessment & Plan     Ampyra improving walking    T25FW 7.88 sec

## 2019-12-12 RX ORDER — DALFAMPRIDINE 10 MG/1
TABLET, FILM COATED, EXTENDED RELEASE ORAL
Qty: 180 TABLET | Refills: 3 | Status: SHIPPED | OUTPATIENT
Start: 2019-12-12 | End: 2021-02-16

## 2020-02-27 ENCOUNTER — HOSPITAL ENCOUNTER (EMERGENCY)
Facility: HOSPITAL | Age: 45
Discharge: HOME | End: 2020-02-27
Attending: EMERGENCY MEDICINE | Admitting: EMERGENCY MEDICINE

## 2020-02-27 ENCOUNTER — APPOINTMENT (OUTPATIENT)
Dept: GENERAL RADIOLOGY | Facility: HOSPITAL | Age: 45
End: 2020-02-27

## 2020-02-27 VITALS
TEMPERATURE: 98.7 F | HEIGHT: 62 IN | WEIGHT: 180 LBS | DIASTOLIC BLOOD PRESSURE: 80 MMHG | BODY MASS INDEX: 33.13 KG/M2 | HEART RATE: 85 BPM | RESPIRATION RATE: 15 BRPM | SYSTOLIC BLOOD PRESSURE: 126 MMHG | OXYGEN SATURATION: 98 %

## 2020-02-27 DIAGNOSIS — S80.00XA CONTUSION OF KNEE, INITIAL ENCOUNTER: ICD-10-CM

## 2020-02-27 DIAGNOSIS — S63.92XA HAND SPRAIN, LEFT, INITIAL ENCOUNTER: Primary | ICD-10-CM
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
## 2020-02-27 PROCEDURE — 99283 EMERGENCY DEPT VISIT LOW MDM: CPT

## 2020-02-27 PROCEDURE — 73130 X-RAY EXAM OF HAND: CPT

## 2020-02-27 PROCEDURE — 73110 X-RAY EXAM OF WRIST: CPT

## 2020-02-27 PROCEDURE — 73560 X-RAY EXAM OF KNEE 1 OR 2: CPT

## 2020-03-23 RX ORDER — LEVETIRACETAM 500 MG/1
TABLET, EXTENDED RELEASE ORAL
Qty: 180 TABLET | Refills: 2 | Status: SHIPPED | OUTPATIENT
Start: 2020-03-23 | End: 2021-04-12

## 2020-05-01 ENCOUNTER — OFFICE VISIT (OUTPATIENT)
Dept: NEUROLOGY | Facility: CLINIC | Age: 45
End: 2020-05-01

## 2020-05-01 ENCOUNTER — APPOINTMENT (OUTPATIENT)
Dept: LAB | Facility: HOSPITAL | Age: 45
End: 2020-05-01

## 2020-05-01 VITALS
WEIGHT: 200 LBS | HEART RATE: 104 BPM | BODY MASS INDEX: 36.8 KG/M2 | OXYGEN SATURATION: 98 % | DIASTOLIC BLOOD PRESSURE: 80 MMHG | HEIGHT: 62 IN | SYSTOLIC BLOOD PRESSURE: 146 MMHG

## 2020-05-01 DIAGNOSIS — M21.372 LEFT FOOT DROP: ICD-10-CM

## 2020-05-01 DIAGNOSIS — R26.9 GAIT ABNORMALITY: ICD-10-CM

## 2020-05-01 DIAGNOSIS — G35 MS (MULTIPLE SCLEROSIS) (HCC): ICD-10-CM

## 2020-05-01 DIAGNOSIS — D50.8 IRON DEFICIENCY ANEMIA SECONDARY TO INADEQUATE DIETARY IRON INTAKE: ICD-10-CM

## 2020-05-01 DIAGNOSIS — M62.838 MUSCLE SPASTICITY: ICD-10-CM

## 2020-05-01 LAB
ALBUMIN SERPL-MCNC: 3.8 G/DL (ref 3.5–5.2)
ALBUMIN/GLOB SERPL: 1 G/DL
ALP SERPL-CCNC: 111 U/L (ref 39–117)
ALT SERPL W P-5'-P-CCNC: 7 U/L (ref 1–33)
ANION GAP SERPL CALCULATED.3IONS-SCNC: 11.7 MMOL/L (ref 5–15)
AST SERPL-CCNC: 14 U/L (ref 1–32)
BASOPHILS # BLD AUTO: 0.04 10*3/MM3 (ref 0–0.2)
BASOPHILS NFR BLD AUTO: 0.6 % (ref 0–1.5)
BILIRUB SERPL-MCNC: 0.2 MG/DL (ref 0.2–1.2)
BUN BLD-MCNC: 8 MG/DL (ref 6–20)
BUN/CREAT SERPL: 9.8 (ref 7–25)
CALCIUM SPEC-SCNC: 9.1 MG/DL (ref 8.6–10.5)
CHLORIDE SERPL-SCNC: 100 MMOL/L (ref 98–107)
CO2 SERPL-SCNC: 25.3 MMOL/L (ref 22–29)
CREAT BLD-MCNC: 0.82 MG/DL (ref 0.57–1)
DEPRECATED RDW RBC AUTO: 49 FL (ref 37–54)
EOSINOPHIL # BLD AUTO: 0.25 10*3/MM3 (ref 0–0.4)
EOSINOPHIL NFR BLD AUTO: 3.5 % (ref 0.3–6.2)
ERYTHROCYTE [DISTWIDTH] IN BLOOD BY AUTOMATED COUNT: 15.5 % (ref 12.3–15.4)
GFR SERPL CREATININE-BSD FRML MDRD: 92 ML/MIN/1.73
GLOBULIN UR ELPH-MCNC: 4 GM/DL
GLUCOSE BLD-MCNC: 85 MG/DL (ref 65–99)
HCT VFR BLD AUTO: 31.1 % (ref 34–46.6)
HGB BLD-MCNC: 10 G/DL (ref 12–15.9)
IMM GRANULOCYTES # BLD AUTO: 0.03 10*3/MM3 (ref 0–0.05)
IMM GRANULOCYTES NFR BLD AUTO: 0.4 % (ref 0–0.5)
LYMPHOCYTES # BLD AUTO: 2.41 10*3/MM3 (ref 0.7–3.1)
LYMPHOCYTES NFR BLD AUTO: 33.4 % (ref 19.6–45.3)
MCH RBC QN AUTO: 27.7 PG (ref 26.6–33)
MCHC RBC AUTO-ENTMCNC: 32.2 G/DL (ref 31.5–35.7)
MCV RBC AUTO: 86.1 FL (ref 79–97)
MONOCYTES # BLD AUTO: 0.65 10*3/MM3 (ref 0.1–0.9)
MONOCYTES NFR BLD AUTO: 9 % (ref 5–12)
NEUTROPHILS # BLD AUTO: 3.83 10*3/MM3 (ref 1.7–7)
NEUTROPHILS NFR BLD AUTO: 53.1 % (ref 42.7–76)
NRBC BLD AUTO-RTO: 0 /100 WBC (ref 0–0.2)
PLATELET # BLD AUTO: 417 10*3/MM3 (ref 140–450)
PMV BLD AUTO: 9.5 FL (ref 6–12)
POTASSIUM BLD-SCNC: 4 MMOL/L (ref 3.5–5.2)
PROT SERPL-MCNC: 7.8 G/DL (ref 6–8.5)
RBC # BLD AUTO: 3.61 10*6/MM3 (ref 3.77–5.28)
SODIUM BLD-SCNC: 137 MMOL/L (ref 136–145)
WBC NRBC COR # BLD: 7.21 10*3/MM3 (ref 3.4–10.8)

## 2020-05-01 PROCEDURE — 86711 JOHN CUNNINGHAM ANTIBODY: CPT | Performed by: NURSE PRACTITIONER

## 2020-05-01 PROCEDURE — 85025 COMPLETE CBC W/AUTO DIFF WBC: CPT | Performed by: NURSE PRACTITIONER

## 2020-05-01 PROCEDURE — 80053 COMPREHEN METABOLIC PANEL: CPT | Performed by: NURSE PRACTITIONER

## 2020-05-01 PROCEDURE — 99214 OFFICE O/P EST MOD 30 MIN: CPT | Performed by: NURSE PRACTITIONER

## 2020-05-01 PROCEDURE — 36415 COLL VENOUS BLD VENIPUNCTURE: CPT | Performed by: NURSE PRACTITIONER

## 2020-05-01 NOTE — PROGRESS NOTES
Subjective:     Patient ID: Yifan Bowman is a 45 y.o. female.  Chief Complaint   Patient presents with   • Multiple Sclerosis Clinic       History of Present Illness     45 y.o. woman returns in follow up for RRMS, spasticity and Iron def anemia. Last visit on 11/7/19 continued Aubagio 14 mg daily, ordered labs, continued Ampyra, LVT and Iron.      MRI Brain/Cervical 4/25/19 as compared to 3/7/18, no new/enlarging/enhancing T2 PVWM, mild/mod     11/7/19 -  Hgb 11.6, HCT 34.1, is taking the iron intermittently. Does not eat consistently and will skip if she is not eating.     RRMS    Clinic #'s 5/1/20    25ft timed walk Trial 1 (Regular) : 9.7  25ft timed walk Trial 2  (Fast) : 8.5     Number of falls in the past year:: 5  Right SLS in seconds:: 2  Left SLS in seconds:: 1  Right Hand Trial 1:: 21.9  Right Hand Trial 2:: 18.2  Left Hand Trial 1:: 20.5  Left Hand Trial 2:: 20.6  Dominant Hand?: Right   strength R Hand Trial 1: 50   strength R Hand Trial 2: 40   strength L Hand Trial 1: 30   strength L Hand Trial 2: 35  SDMT: 41  Written or verbal?: Written  ADL status: frequent falls, must use walls to keep steady  Adaptive/Mobility needs:: AFO, left foot drop  PBA-CNS-LS: 7  Passed self-questionnaire: Yes  Passed 3oz water test: Yes  Employment Status: Employed  EDDS Score: 3.5     Needs new  L AFO, has L foot drop, has been falling more frequently 5 times in the past year. T25FW worsened to 9.7 sec from 7.88 sec    Brand Ampyra improved walking, speed has slowed.      Fatigue is mild.  Heat intolerance are moderate.      Continued left 1 - 3 digits decrease to mild numbness.      Left leg spasticity    LVT stable spasticity  stiffness in left leg.   Denies side effects of Keppra    Problem history:    Previously dx and followed by Dr Franklin.  7 years ago fell off porch for unknown reasons.  Left hip is painful and weak.  Pain present when walking.  Left weakness has worsened.  Sx started two  months after delivery of 4th child. Band like pain and pressure around lower abdomen.  Noticeable fatigue.  Heat increases sleepiness.  Bladder frequency.  Numbness medial left calf and fingertips.  Started on Aubagio without side effects.      Reviewed Dr Franklin's notes:    Presented on 17 for left LE pain, numbness and weakness for 6 years.  Also, notes intermittent hand numbness.  MRI results below.  Started on Prednisone and Aubagio 7 mg on 17  Labs - ACE, Lyme, LUCY, CMP, CBC, TSH, CRP reviewed    My review of films:  MRI Brain 2/15/17 3/30/17  Large left anterior CC black hole, 10 to 15 T2 lesions, multiple CC lesions. No evidence of enhancement    MRI C-spine 3/30/17 C4/5 T2 cord signal w/o enhancement    The following portions of the patient's history were reviewed and updated as appropriate:   She  has a past medical history of MS (multiple sclerosis) (CMS/[facility]) (3/23/2017).  She  has a past surgical history that includes Reduction mammaplasty and  section.  Her family history includes Cancer in her paternal grandfather.  She  reports that she has never smoked. She has never used smokeless tobacco. She reports that she does not drink alcohol or use drugs.  Current Outpatient Medications   Medication Sig Dispense Refill   • AMPYRA 10 MG tablet sustained-release 12 hour TAKE 1 TABLET BY MOUTH EVERY 12 HOURS 180 tablet 3   • ferrous sulfate 325 (65 FE) MG tablet Take 325 mg by mouth Daily With Breakfast.     • levETIRAcetam XR (KEPPRA XR) 500 MG 24 hr tablet TAKE 2 TABLETS BY MOUTH ONCE DAILY. 180 tablet 2   • Teriflunomide (AUBAGIO) 14 MG tablet Take 14 mg by mouth Daily. 90 tablet 3     No current facility-administered medications for this visit.      Current Outpatient Medications on File Prior to Visit   Medication Sig   • AMPYRA 10 MG tablet sustained-release 12 hour TAKE 1 TABLET BY MOUTH EVERY 12 HOURS   • ferrous sulfate 325 (65 FE) MG tablet Take 325 mg by mouth Daily With Breakfast.    • levETIRAcetam XR (KEPPRA XR) 500 MG 24 hr tablet TAKE 2 TABLETS BY MOUTH ONCE DAILY.   • Teriflunomide (AUBAGIO) 14 MG tablet Take 14 mg by mouth Daily.     No current facility-administered medications on file prior to visit.      She is allergic to benadryl [diphenhydramine]..    Review of Systems   Constitutional: Negative for activity change and unexpected weight change.   HENT: Negative for tinnitus and trouble swallowing.    Eyes: Negative for photophobia and visual disturbance.   Respiratory: Negative for apnea and choking.    Cardiovascular: Negative for leg swelling.   Endocrine: Positive for heat intolerance. Negative for cold intolerance.   Genitourinary: Positive for frequency. Negative for difficulty urinating, menstrual problem and urgency.   Musculoskeletal: Positive for gait problem. Negative for back pain.   Skin: Negative for color change.   Allergic/Immunologic: Negative for immunocompromised state.   Neurological: Negative for dizziness, tremors, seizures, syncope, facial asymmetry, speech difficulty and light-headedness.   Hematological: Negative for adenopathy. Does not bruise/bleed easily.   Psychiatric/Behavioral: Negative for behavioral problems, confusion, decreased concentration, hallucinations and sleep disturbance.        Objective:  Vitals:    05/01/20 0849   BP: 146/80   Pulse: 104   SpO2: 98%   ,  Neurologic Exam     Mental Status   Registration: recalls 3 of 3 objects. Recall at 5 minutes: recalls 3 of 3 objects. Follows 3 step commands.   Attention: normal. Concentration: normal.   Level of consciousness: alert  Knowledge: good and consistent with education.   Able to name object. Able to read. Able to repeat. Able to write. Normal comprehension.     Cranial Nerves     CN II   Visual fields full to confrontation.   Visual acuity: normal  Right visual field deficit: none  Left visual field deficit: none     CN III, IV, VI   Right pupil: Shape: regular. Reactivity: brisk.  Consensual response: intact.   Left pupil: Shape: regular. Reactivity: brisk. Consensual response: intact.   Nystagmus: none   Diplopia: none  Ophthalmoparesis: none  Upgaze: normal  Downgaze: normal  Conjugate gaze: present  Vestibulo-ocular reflex: present    CN V   Facial sensation intact.   Right corneal reflex: normal  Left corneal reflex: normal    CN VII   Right facial weakness: none  Left facial weakness: none    CN VIII   Hearing: intact    CN IX, X   Palate: symmetric  Right gag reflex: normal  Left gag reflex: normal    CN XI   Right sternocleidomastoid strength: normal  Left sternocleidomastoid strength: normal    CN XII   Tongue: not atrophic  Fasciculations: absent  Tongue deviation: none    Motor Exam   Muscle bulk: normal  Overall muscle tone: normal  Right arm tone: normal  Left arm tone: normal  Right leg tone: normal  Left leg tone: increased    Strength   Strength 5/5 except as noted.   Left iliopsoas: 5/5  Left quadriceps: 5/5  Left hamstrin/5  Left glutei: 5/5  Left anterior tibial: 4/5  Left posterior tibial: 4/5  Left peroneal: 4/5  Left gastroc: 4/5    Sensory Exam   Light touch normal.   Vibration normal.   Proprioception normal.   Pinprick normal.         Gait, Coordination, and Reflexes     Gait  Gait: circumduction and spastic (left leg, left foot drop, using walls to steady while ambulate)    Coordination   Tandem walking coordination: abnormal    Tremor   Resting tremor: absent  Intention tremor: absent  Action tremor: absent    Reflexes   Right brachioradialis: 3+  Left brachioradialis: 3+  Right biceps: 3+  Left biceps: 3+  Right triceps: 3+  Left triceps: 3+  Right patellar: 3+  Left patellar: 4+  Right achilles: 3+  Left achilles: 4+  Right ankle clonus: present  Left ankle clonus: present      Physical Exam   Constitutional: She appears well-developed and well-nourished.   Neurological: She has an abnormal Tandem Gait Test.   Reflex Scores:       Tricep reflexes are 3+ on the  right side and 3+ on the left side.       Bicep reflexes are 3+ on the right side and 3+ on the left side.       Brachioradialis reflexes are 3+ on the right side and 3+ on the left side.       Patellar reflexes are 3+ on the right side and 4+ on the left side.       Achilles reflexes are 3+ on the right side and 4+ on the left side.  Nursing note and vitals reviewed.      Assessment/Plan    Problem List Items Addressed This Visit        Nervous and Auditory    MS (multiple sclerosis) (CMS/[facility]) - Primary    Current Assessment & Plan     Marked worsening of disability in MS clinic metrics     Discussed switching to Tysabri, discussed side effects and risk of PML. Patient given handout.     CBC, CMP, JCV    MRI Brain w/wo     PT/SLP     F/U in 2 months          Relevant Orders    CBC & Differential    Comprehensive Metabolic Panel    MRI Brain With & Without Contrast    Stratify JCV, Antibody INES With / Reflex To Inhibition Assay    Ambulatory Referral to Physical Therapy Evaluate and treat    Ambulatory Referral to Speech Therapy    CBC Auto Differential       Musculoskeletal and Integument    Muscle spasticity    Current Assessment & Plan     Continue Keppra    Frequent falls, recommend patient buy a cane or walker until she gets the AFO.             Hematopoietic and Hemostatic    Iron deficiency anemia secondary to inadequate dietary iron intake    Current Assessment & Plan     CBC ordered          Relevant Medications    ferrous sulfate 325 (65 FE) MG tablet       Other    Gait abnormality    Current Assessment & Plan     Ordered PT Bruce         Left foot drop    Current Assessment & Plan     Ordered AFO            Return in about 2 months (around 7/1/2020).

## 2020-05-01 NOTE — ASSESSMENT & PLAN NOTE
Marked worsening of disability in MS clinic metrics     Discussed switching to Tysabri, discussed side effects and risk of PML. Patient given handout.     CBC, CMP, JCV    MRI Brain w/wo     PT/SLP     F/U in 2 months

## 2020-05-01 NOTE — ASSESSMENT & PLAN NOTE
Continue Keppra    Frequent falls, recommend patient buy a cane or walker until she gets the AFO.

## 2020-05-04 ENCOUNTER — TELEPHONE (OUTPATIENT)
Dept: NEUROLOGY | Facility: CLINIC | Age: 45
End: 2020-05-04

## 2020-05-04 RX ORDER — SODIUM CHLORIDE 9 MG/ML
250 INJECTION, SOLUTION INTRAVENOUS ONCE
Status: CANCELLED | OUTPATIENT
Start: 2020-05-04

## 2020-05-04 RX ORDER — ACETAMINOPHEN 325 MG/1
650 TABLET ORAL ONCE
Status: CANCELLED | OUTPATIENT
Start: 2020-05-04

## 2020-05-04 NOTE — TELEPHONE ENCOUNTER
Called 1x. Went straight to voicemail. I LVM informing patient that per Miranda, she is still amenic and needs to be more consistent with taking her Iron. I provided my name and call back number.   -TMT 05/04/2020

## 2020-05-04 NOTE — TELEPHONE ENCOUNTER
----- Message from TRES Lazcano sent at 5/4/2020  8:26 AM EDT -----  Please let er know she is still Anemic. She needs to take her iron more consistently.

## 2020-05-05 RX ORDER — CETIRIZINE HYDROCHLORIDE 10 MG/1
10 TABLET ORAL ONCE
Status: CANCELLED
Start: 2020-05-05

## 2020-05-06 LAB — REF LAB TEST METHOD: NORMAL

## 2020-05-07 RX ORDER — SODIUM CHLORIDE 9 MG/ML
250 INJECTION, SOLUTION INTRAVENOUS ONCE
Status: CANCELLED | OUTPATIENT
Start: 2020-05-07

## 2020-05-07 RX ORDER — ACETAMINOPHEN 325 MG/1
650 TABLET ORAL ONCE
Status: CANCELLED | OUTPATIENT
Start: 2020-05-07

## 2020-05-07 RX ORDER — CETIRIZINE HYDROCHLORIDE 10 MG/1
10 TABLET ORAL ONCE
Status: CANCELLED
Start: 2020-05-07

## 2020-05-12 ENCOUNTER — HOSPITAL ENCOUNTER (OUTPATIENT)
Dept: MRI IMAGING | Facility: HOSPITAL | Age: 45
Discharge: HOME | End: 2020-05-12
Admitting: NURSE PRACTITIONER

## 2020-05-12 DIAGNOSIS — G35 MS (MULTIPLE SCLEROSIS) (HCC): ICD-10-CM

## 2020-05-12 PROCEDURE — A9577 INJ MULTIHANCE: HCPCS | Performed by: NURSE PRACTITIONER

## 2020-05-12 PROCEDURE — 70553 MRI BRAIN STEM W/O & W/DYE: CPT

## 2020-05-12 PROCEDURE — 0 GADOBENATE DIMEGLUMINE 529 MG/ML SOLUTION: Performed by: NURSE PRACTITIONER

## 2020-05-12 RX ADMIN — GADOBENATE DIMEGLUMINE 18 ML: 529 INJECTION, SOLUTION INTRAVENOUS at 10:15

## 2020-05-15 ENCOUNTER — TELEPHONE (OUTPATIENT)
Dept: NEUROLOGY | Facility: CLINIC | Age: 45
End: 2020-05-15

## 2020-05-15 ENCOUNTER — TREATMENT (OUTPATIENT)
Dept: PHYSICAL THERAPY | Facility: CLINIC | Age: 45
End: 2020-05-15

## 2020-05-15 ENCOUNTER — OFFICE VISIT (OUTPATIENT)
Dept: PHYSICAL THERAPY | Facility: CLINIC | Age: 45
End: 2020-05-15

## 2020-05-15 DIAGNOSIS — M21.372 LEFT FOOT DROP: ICD-10-CM

## 2020-05-15 DIAGNOSIS — R41.841 COGNITIVE COMMUNICATION DEFICIT: ICD-10-CM

## 2020-05-15 DIAGNOSIS — G35 MULTIPLE SCLEROSIS (HCC): Primary | ICD-10-CM

## 2020-05-15 DIAGNOSIS — R26.9 GAIT ABNORMALITY: Primary | ICD-10-CM

## 2020-05-15 DIAGNOSIS — R26.89 BALANCE PROBLEM: ICD-10-CM

## 2020-05-15 PROCEDURE — 97110 THERAPEUTIC EXERCISES: CPT | Performed by: PHYSICAL THERAPIST

## 2020-05-15 PROCEDURE — 92523 SPEECH SOUND LANG COMPREHEN: CPT | Performed by: SPEECH-LANGUAGE PATHOLOGIST

## 2020-05-15 PROCEDURE — 97162 PT EVAL MOD COMPLEX 30 MIN: CPT | Performed by: PHYSICAL THERAPIST

## 2020-05-15 NOTE — PROGRESS NOTES
Outpatient Physical Therapy Neuro Initial Evaluation       Patient Name: Yifan Bowman  : 1975  MRN: [id number]  Today's Date: 5/15/2020      Visit Date:     Patient Active Problem List   Diagnosis   • MS (multiple sclerosis) (CMS/HCC)   • Iron deficiency anemia secondary to inadequate dietary iron intake   • Muscle spasticity   • Gait abnormality   • Left foot drop        Past Medical History:   Diagnosis Date   • MS (multiple sclerosis) (CMS/HCC) 3/23/2017        Past Surgical History:   Procedure Laterality Date   •  SECTION     • REDUCTION MAMMAPLASTY           Visit Dx:     ICD-10-CM ICD-9-CM   1. Gait abnormality R26.9 781.2   2. Left foot drop M21.372 736.79                                                              Time Calculation:                       Meghan Lopez PT  5/15/2020     Physical Therapy Initial Evaluation and Plan of Care      Patient: Yifan Bowman   : 1975  Diagnosis/ICD-10 Code:  Gait abnormality [R26.9]  Referring practitioner: Richard Coronado MD  Date of Initial Visit: 5/15/2020  Today's Date: 5/15/2020  Patient seen for 1 sessions           Subjective Questionnaire: n/a      Subjective Evaluation    History of Present Illness  Mechanism of injury: Pt was diagnosed with RRMS in  and pt was here for therapy for imbalance and left foot drop.  Since that time pt has had a functional decline with increased L foot drop and falls.  Pt reports having 6 falls this past year. Pt is awaiting the appt at [facility] for new L AFO.  Pt used to have a L toe-off brace and has lost it.        Patient Occupation: [employer] Quality of life: fair    Pain  Current pain ratin  At best pain ratin  At worst pain ratin  Location: R knee  Relieving factors: rest    Social Support  Lives in: multiple-level home (3 ruby with 1HR; 15 steps to second floor with 1HR)  Lives with: 6 kids (17-10 y/o)    Hand dominance: right    Patient  Goals  Patient goals for therapy: increased strength and improved balance  Patient goal: walking with brace           Objective          Static Posture     Knee   Genu valgus.   Knee (Left): Recurvatum.   Knee (Right): Recurvatum.     Neurological Testing     Sensation     Hip   Left Hip   Intact: light touch    Right Hip   Intact: light touch    Knee   Left Knee   Intact: light touch    Right Knee   Intact: light touch     Ankle/Foot   Left Ankle/Foot   Intact: light touch and proprioception    Right Ankle/Foot   Intact: light touch and proprioception     Additional Neurological Details  L quad/hamstring 2+/5 tone      Passive Range of Motion   Left Ankle/Foot    Dorsiflexion (kf): 1 degrees     Additional Passive Range of Motion Details  L ankle DF PROM: 15 degrees from 0    Strength/Myotome Testing     Left Hip   Planes of Motion   Flexion: 3-  Extension: 3-  Abduction: 3-  Adduction: 3-    Right Hip   Planes of Motion   Flexion: 3+  Extension: 3+  Abduction: 3+  Adduction: 3+    Left Knee   Flexion: 4-  Extension: 4    Right Knee   Flexion: 4-  Extension: 4    Ambulation     Ambulation: Level Surfaces   Ambulation without assistive device: contact guard assist    Additional Level Surfaces Ambulation Details  Pt grabs onto walls and furniture for support    Ambulation: Stairs   Ascend stairs: contact guard assist  Pattern: reciprocal  Railings: two rails  Descend stairs: contact guard assist  Pattern: reciprocal  Railings: two rails    Additional Stairs Ambulation Details  Pt uses momentum to throw LE's up onto the step with ascending, B circumduction    Observational Gait   Gait: asymmetric   Decreased walking speed and stride length.   Left arm swing: high guard  Right arm swing: high guard  Base of support: decreased    Quality of Movement During Gait     Hip    Hip (Left): Positive circumducted.   Hip (Right): Positive circumducted.     Knee    Knee (Left): Positive recurvatum and valgus.   Knee (Right):  Positive recurvatum and valgus.     Ankle    Ankle (Left): Positive foot drop and lateral heel whip.   Ankle (Right): Positive lateral heel whip.         PT Neuro         Assessment & Plan     Assessment  Impairments: abnormal gait, abnormal muscle tone, activity intolerance, impaired balance, impaired physical strength, lacks appropriate home exercise program and safety issue  Other impairment: balance  Assessment details: Pt presents with evolving symptoms secondary to MS.  Pt has increased tone L LE, L foot drop and impaired strength, balance and overall functional mobility.  Pt to benefit from L AFO/toe-off brace for foot drop.  Pt to benefit from skilled PT services.  Prognosis: fair  Functional Limitations: carrying objects, lifting, walking and standing  Goals  Plan Goals: STG (6 visits)  1. Patient to improve STEINER balance score to >/= 41 /56 to decrease client's risk of falls.  2. Patient to perform TUG within 14 sec without LOB for improved functional mobility.  3. Patient to ambulate 10 meters without AD within 13 sec without LOB for improved gait jena and functional mobility.    LTG (12 visits)  1. Patient to improve STEINER balance score to >/= 48 /56 to decrease client's risk of falls.  2. Patient to perform TUG within 13 sec without LOB for improved functional mobility.  3. Patient to ambulate 10 meters without AD within 12 sec without LOB for improved gait jena and functional mobility.  4. Patient to be I with HEP.    Plan  Therapy options: will be seen for skilled physical therapy services  Planned modality interventions: electrical stimulation/Russian stimulation  Planned therapy interventions: fine motor coordination training, gait training, home exercise program, neuromuscular re-education, strengthening, abdominal trunk stabilization, balance/weight-bearing training, motor coordination training and orthotic fitting/training  Frequency: 1x week  Duration in visits: 12  Treatment plan discussed  with: patient  Plan details: Patient will be seen 1x/wk x 12 visits with treatment to include strengthening, stretching, functional e-stim therapy, neuromuscular re-education, balance, gait and endurance training.         Timed:  Manual Therapy:    0     mins  19522;  Therapeutic Exercise:    10     mins  11231;     Neuromuscular Anita:    0    mins  06114;    Therapeutic Activity:     0     mins  14305;     Gait Trainin     mins  86291;     Ultrasound:     0     mins  82710;    Electrical Stimulation:    0     mins  74151 ( );    Untimed:  Electrical Stimulation:    0     mins  59946 ( );  Mechanical Traction:    0     mins  34164;     Timed Treatment:   10   mins   Total Treatment:     60   mins    PT SIGNATURE: Meghan Lopez, PT   DATE TREATMENT INITIATED: 5/15/2020    Initial Certification  Certification Period: 2020  I certify that the therapy services are furnished while this patient is under my care.  The services outlined above are required by this patient, and will be reviewed every 90 days.     PHYSICIAN: Richard Coronado MD      DATE:     Please sign and return via fax to  .. Thank you, [facility].

## 2020-05-15 NOTE — PROGRESS NOTES
"Outpatient Speech Language Pathology   Adult Speech Language Cognitive Initial Evaluation       Patient Name: Yifan Bowman  : 1975  MRN: [id number]  Today's Date: 5/15/2020        Visit Date: 05/15/2020   Patient Active Problem List   Diagnosis   • MS (multiple sclerosis) (CMS/HCC)   • Iron deficiency anemia secondary to inadequate dietary iron intake   • Muscle spasticity   • Gait abnormality   • Left foot drop        Past Medical History:   Diagnosis Date   • MS (multiple sclerosis) (CMS/HCC) 3/23/2017        Past Surgical History:   Procedure Laterality Date   •  SECTION     • REDUCTION MAMMAPLASTY           Visit Dx:    ICD-10-CM ICD-9-CM   1. Multiple sclerosis (CMS/HCC) G35 340   2. Cognitive communication deficit R41.841 799.52           SLP SLC Evaluation - 05/15/20 1000        Communication Assessment/Intervention    Document Type  evaluation   -RB    Total Evaluation Minutes, SLP  45   -RB    Subjective Information  no complaints   -RB    Patient Observations  alert;cooperative;agree to therapy   -RB    Patient Effort  good   -RB    Symptoms Noted During/After Treatment  none   -RB       General Information    Patient Profile Reviewed  yes   -RB    Pertinent History Of Current Problem  Pt has had MS for 3 years. She saw speech therapy in  for cognitive rehabilitation. MRI on  showed: \"Stable nonenhancing white matter disease, with approximately 15 lesions\" . Pt reports diffiuclty with finance, medicine, and appointment management as well as occasional disorientation to place. She is a  for [employer] and works for a longterm community.    -RB    Precautions/Limitations, Vision  WFL;for purposes of eval   -RB    Precautions/Limitations, Hearing  WFL;for purposes of eval   -RB    Patient Level of Education  associate degree   -RB    Prior Level of Function-Communication  cognitive-linguistic impairment   -RB    Plans/Goals Discussed with  " patient;agreed upon   -RB    Barriers to Rehab  none identified   -RB    Patient's Goals for Discharge  functional cognition   -RB    Family Goals for Discharge  functional cognition   -RB    Standardized Assessment Used  RBANS   -RB       Pain Assessment    Additional Documentation  Pain Scale: Numbers Pre/Post-Treatment (Group)   -RB       Pain Scale: Numbers Pre/Post-Treatment    Pain Scale: Numbers, Pretreatment  0/10 - no pain   -RB    Pain Scale: Numbers, Post-Treatment  0/10 - no pain   -RB       Comprehension Assessment/Intervention    Comprehension Assessment/Intervention  Auditory Comprehension   -RB       Auditory Comprehension Assessment/Intervention    Auditory Comprehension (Communication)  mild impairment   -RB    Able to Identify Objects/Pictures (Communication)  pictures of common objects;familiar objects;mild impairment   -RB    Answers Questions (Communication)  mild impairment;wh questions;personal;complex   -RB    Able to Follow Commands (Communication)  WFL   -RB    Narrative Discourse  mild impairment;conversational level   -RB    Successful Auditory Strategies (Communication)  repetition;visual cues;phonemic cues;semantic cues;decrease environmental distractions   -RB       Expression Assessment/Intervention    Expression Assessment/Intervention  verbal expression   -RB       Verbal Expression Assessment/Intervention    Verbal Expression  WFL   -RB       Oral Motor Structure and Function    Oral Motor Structure and Function  WFL   -RB       Oral Musculature and Cranial Nerve Assessment    Oral Motor General Assessment  WFL   -RB       Motor Speech Assessment/Intervention    Motor Speech Function  WFL   -RB       Cursory Voice Assessment/Intervention    Quality and Resonance (Voice)  WFL   -RB       Cognitive Assessment Intervention- SLP    Cognitive Function (Cognition)  mild impairment;moderate impairment   -RB    Orientation Status (Cognition)  mild impairment;place   -RB    Memory  (Cognitive)  functional;immediate;short-term;delayed;new learning;mild impairment;moderate impairment   -RB    Attention (Cognitive)  WFL   -RB    Thought Organization (Cognitive)  mild impairment;moderate impairment;high level;mental manipulation;concrete divergent;abstract divergent   -RB    Reasoning (Cognitive)  mild impairment;moderate impairment;logic/creative thinking;mental flexibility   -RB    Problem Solving (Cognitive)  WFL   -RB    Functional Math (Cognitive)  mild impairment;moderate impairment;checkbook tasks;money calculation   -RB    Executive Function (Cognition)  mild impairment;moderate impairment;realistic goal setting;deficit awareness;judgement;initiation;time management;planning;self-monitoring/correction;home management activities;complex organization   -RB    Pragmatics (Communication)  WFL   -RB    Right Hemisphere Function  WFL   -RB    Cognition, Comment  Pt demonstrates deficits with functional memory that imapcts her independence at home. pt would benefit from skilled ST   -RB       Standardized Tests    Cognitive/Memory Tests  RBANS: Repeatable Battery for the Assessment of Neuropsychological Status   -RB       RBANS- Repeatable Battery for the Assessment of Neuropsychological Status    Immediate Memory Index Score  103   -RB    Immediate Memory Qualitative Description  average   -RB    Visuospatial Index Score  109   -RB    Visuospatial Qualitative Description  average   -RB    Language Index Score  87   -RB    Language Qualitative Description  low average   -RB    Attention Index Score  115   -RB    Attention Qualitative Description  high average   -RB    Delayed Memory Index Score  107   -RB    Delayed Memory Qualitative Description  average   -RB    Total Index Score  521   -RB    Total Percentile  63 %   -RB    Total Qualitative Description  average   -RB    RBANS Comments  Pt showed significant gains since RBANS assessment in 2017.    -RB      User Key  (r) = Recorded By, (t) =  Taken By, (c) = Cosigned By    Initials Name Provider Type    Jada Randolph SLP Speech and Language Pathologist         LTGs   Pt and family will implement compensatory strategies to maximize patient’s memory function so patient can continue to participate in daily activities with 80% accuracy and no cues    Pt will be able to remember information needed to participate in avocational activities with 80% accuracy and no cues    STGs  Pt’s memory skills will be enhanced as reported by patient by utilizing internal memory strategies to recall up to 3 pieces of functional information after a 5 minute delay with 80% accuracy and no cues    Pt’s memory skills will be enhanced as reported by patient using external memory aides. with 80% accuracy and no cues    Pt will be independent with financial, appointment, and medicine management in sessions with 100% accuracy with 80% accuracy and no cues     Pt will improve executive functioning skills by using planning strategies prior to beginning tasks with 80% accuracy and no cues    Pt will improve executive functioning skills by using self-monitoring strategies during functional tasks with 80% accuracy and no cues            OP SLP Education     Row Name 05/15/20 1000       Education    Barriers to Learning  No barriers identified  -RB    Education Provided  Described results of evaluation;Patient expressed understanding of evaluation;Patient participated in establishing goals and treatment plan  -RB    Assessed  Learning needs;Learning motivation;Learning preferences;Learning readiness  -RB    Learning Motivation  Moderate  -RB    Learning Method  Explanation;Demonstration  -RB    Teaching Response  Verbalized understanding;Demonstrated understanding;Reinforcement needed  -RB    Education Comments  educated on evaluation results and POC   -RB      User Key  (r) = Recorded By, (t) = Taken By, (c) = Cosigned By    Initials Name Effective Dates    Jada Randolph SLP  02/28/20 -               OP SLP Assessment/Plan - 05/15/20 1000        SLP Assessment    Functional Problems  Speech Language- Adult/Cognition   -RB    Impact on Function: Adult Speech Language/Cognition  Trouble learning or remembering new information;Restrictions in personal and social life;Difficulty participating in avocational activities   -RB    Clinical Impression: Speech Language-Adult/Congnition  Mild-Moderate:;Cognitive Communication Impairment   -RB    Functional Problems Comment  Pt has diffiuclties with activities of independence (finance, appointment, and medicine management).    -RB    Clinical Impression Comments  Would benefit from skilled ST   -RB    Please refer to paper survey for additional self-reported information  Yes   -RB    Please refer to items scanned into chart for additional diagnostic informaiton and handouts as provided by clinician  Yes   -RB    SLP Diagnosis  mild-moderate cognitive communication deficit   -RB    Prognosis  Good (comment)   -RB    Patient/caregiver participated in establishment of treatment plan and goals  Yes   -RB    Patient would benefit from skilled therapy intervention  Yes   -RB       SLP Plan    Frequency  1x/weekly   -RB    Duration  12 weeks   -RB    Planned CPT's?  SLP INDIVIDUAL SPEECH THERAPY: 94013   -RB    Expected Duration Therapy Session - minutes  45-60 minutes   -RB    Plan Comments  initiate plan of care    -RB      User Key  (r) = Recorded By, (t) = Taken By, (c) = Cosigned By    Initials Name Provider Type    RB Jada Darling, SLP Speech and Language Pathologist                Jada Darling MA CCC-SLP  5/15/2020

## 2020-05-22 ENCOUNTER — TREATMENT (OUTPATIENT)
Dept: PHYSICAL THERAPY | Facility: CLINIC | Age: 45
End: 2020-05-22

## 2020-05-22 DIAGNOSIS — R26.89 BALANCE PROBLEM: ICD-10-CM

## 2020-05-22 DIAGNOSIS — R26.9 GAIT ABNORMALITY: Primary | ICD-10-CM

## 2020-05-22 DIAGNOSIS — G35 MULTIPLE SCLEROSIS (HCC): ICD-10-CM

## 2020-05-22 DIAGNOSIS — R41.841 COGNITIVE COMMUNICATION DEFICIT: Primary | ICD-10-CM

## 2020-05-22 DIAGNOSIS — M21.372 LEFT FOOT DROP: ICD-10-CM

## 2020-05-22 PROCEDURE — 92507 TX SP LANG VOICE COMM INDIV: CPT | Performed by: SPEECH-LANGUAGE PATHOLOGIST

## 2020-05-22 PROCEDURE — 97110 THERAPEUTIC EXERCISES: CPT | Performed by: PHYSICAL THERAPIST

## 2020-05-22 PROCEDURE — 97112 NEUROMUSCULAR REEDUCATION: CPT | Performed by: PHYSICAL THERAPIST

## 2020-05-22 NOTE — PROGRESS NOTES
"Physical Therapy Daily Progress Note  Visit: 2  Date of Initial Visit: Type: THERAPY  Noted: 5/15/2020    Felicityia Colyb reports: \"I went to [facility] and she said they will get me a new brace like the other one.  I can get to my car today and see if my old brace is in it.  If I find it then she said she would adjust it.\"    Subjective Evaluation    Pain  No pain reported           Objective   See Exercise, Manual, and Modality Logs for complete treatment.    Exercise 1  Exercise Name 1: NuStep B UE/LEs  Equipment/Resistance 1: level 5  Time: 8 min  Exercise 2  Exercise Name 2: B sidestepping with B UE A   Equipment/Resistance 2: red tband; VCing to decrease conmpensation  Sets/Reps 2: 3 min  Exercise 3  Exercise Name 3: Quad B LE extension with 3 sec hold; B fire hydrant  Equipment/Resistance 3: CGA  Sets/Reps 3: 10  Exercise 4  Exercise Name 4: St balance with one foot on ball with single UE A  Equipment/Resistance 4: HHA with pt able to hold foot on ball up to 3 sec  Sets/Reps 4: 5  Exercise 5  Exercise Name 5: St balance with one foot on step and hold up to 10 sec; add B cerivcal rot/flex/ext  Equipment/Resistance 5: 6\" step; min A  Sets/Reps 5: 2  Time 5: 5                  PT Neuro          Assessment & Plan     Assessment  Assessment details: Pt requires min A and standing balance activities.  Pt demonstrates compensation with gait with B lateral trunk flexion in stance to assist with swing phase momentum.  Pt demonstrates L foot drop that increases with fatigue.  Pt to benefit from skilled PT services to improve functional mobility.    Plan  Plan details: Pt to benefit from skilled PT services to improve gait, balance, strength, and overall functional mobility.          Manual Therapy:     0     mins  29570;  Therapeutic Exercise:     10     mins  62373;     Neuromuscular Anita:     30    mins  58635;    Therapeutic Activity:      0     mins  72754;     Gait Trainin     mins  45132;   "   Ultrasound:      0     mins  47627;    Electrical Stimulation:     0     mins  27677 ( );  Dry Needling      0     mins self-pay  Traction      0     mins 48604  Canalith Repositioning    0     mins 26814      Timed Treatment:   40   mins   Total Treatment:     40   mins    Meghan Lopez, PT  [place] License #: [id number]    Physical Therapist

## 2020-05-22 NOTE — PROGRESS NOTES
"Outpatient Speech Language Pathology   Adult Speech Language Cognitive Treatment Note       Patient Name: Yifan Bowman  : 1975  MRN: [id number]  Today's Date: 2020         Visit Date: 2020   Patient Active Problem List   Diagnosis   • MS (multiple sclerosis) (CMS/HCC)   • Iron deficiency anemia secondary to inadequate dietary iron intake   • Muscle spasticity   • Gait abnormality   • Left foot drop          Visit Dx:    ICD-10-CM ICD-9-CM   1. Cognitive communication deficit R41.841 799.52   2. Multiple sclerosis (CMS/HCC) G35 340   Pain ratin  Subjective: no concerns noted. Feeling \"good\"   LTGs   Pt and family will implement compensatory strategies to maximize patient’s memory function so patient can continue to participate in daily activities with 80% accuracy and no cues   -Initiated goals and introduced/modeled compensatory strategies  Pt will be able to remember information needed to participate in avocational activities with 80% accuracy and no cues   -Targeted STM tasks with paying bills and taking medicines   STGs  Pt’s memory skills will be enhanced as reported by patient by utilizing internal memory strategies to recall up to 3 pieces of functional information after a 5 minute delay with 80% accuracy and no cues   -6/6 x2 with 3 minute delay   Pt’s memory skills will be enhanced as reported by patient using external memory aides. with 80% accuracy and no cues   -Made a monthly bill paying and daily medicine visual aid to utilize at home. Sent home models   Pt will be independent with financial, appointment, and medicine management in sessions with 100% accuracy with 80% accuracy and no cues   -grocery budgetin%, medicine management: 100%, appointment schedulin%    Pt will improve executive functioning skills by using planning strategies prior to beginning tasks with 80% accuracy and no cues   -initiated and modeled planning strategies today  Pt will improve executive " functioning skills by using self-monitoring strategies during functional tasks with 80% accuracy and no cues  -Initiated and modeled self monitoring strategies throughout tasks.      Recertification: 8/14/2020        OP SLP Education     Row Name 05/22/20 1000       Education    Barriers to Learning  No barriers identified  -RB    Education Provided  Described results of evaluation;Patient expressed understanding of evaluation;Patient demonstrated recommended strategies;Patient requires further education on strategies, risks  -RB    Assessed  Learning needs;Learning motivation;Learning preferences;Learning readiness  -RB    Learning Motivation  Strong  -RB    Learning Method  Explanation;Demonstration;Teach back;Written materials  -RB    Teaching Response  Verbalized understanding;Demonstrated understanding;Reinforcement needed  -RB    Education Comments  sent home bill payment visual aid  -RB      User Key  (r) = Recorded By, (t) = Taken By, (c) = Cosigned By    Initials Name Effective Dates    Jada Randolph SLP 02/28/20 -           OP SLP Assessment/Plan - 05/22/20 1000        SLP Assessment    Functional Problems  Speech Language- Adult/Cognition   -RB    Impact on Function: Adult Speech Language/Cognition  Trouble learning or remembering new information;Poor attention to task;Restrictions in personal and social life;Difficulty participating in avocational activities   -RB    Clinical Impression: Speech Language-Adult/Congnition  Mild-Moderate:;Cognitive Communication Impairment   -RB    Functional Problems Comment  Pt has difficulty with avocational tasks due to cognitive deficits   -RB    Clinical Impression Comments  Pt receptive to treatment strategies   -RB    Please refer to paper survey for additional self-reported information  Yes   -RB    Please refer to items scanned into chart for additional diagnostic informaiton and handouts as provided by clinician  Yes   -RB    SLP Diagnosis  mild-moderate  cogntive communication deficit    -RB    Prognosis  Good (comment)   -RB    Patient/caregiver participated in establishment of treatment plan and goals  Yes   -RB    Patient would benefit from skilled therapy intervention  Yes   -RB       SLP Plan    Frequency  1x/weekly   -RB    Duration  11 weeks   -RB    Planned CPT's?  SLP INDIVIDUAL SPEECH THERAPY: 41960   -RB    Expected Duration Therapy Session - minutes  45-60 minutes   -RB    Plan Comments  continue plan of care   -RB      User Key  (r) = Recorded By, (t) = Taken By, (c) = Cosigned By    Initials Name Provider Type    RB Jada Darling, SLP Speech and Language Pathologist                Jada Darling MA CCC-SLP  5/22/2020

## 2020-05-29 ENCOUNTER — TREATMENT (OUTPATIENT)
Dept: PHYSICAL THERAPY | Facility: CLINIC | Age: 45
End: 2020-05-29

## 2020-05-29 DIAGNOSIS — R26.9 GAIT ABNORMALITY: Primary | ICD-10-CM

## 2020-05-29 DIAGNOSIS — M21.372 LEFT FOOT DROP: ICD-10-CM

## 2020-05-29 DIAGNOSIS — R26.89 BALANCE PROBLEM: ICD-10-CM

## 2020-05-29 DIAGNOSIS — R41.841 COGNITIVE COMMUNICATION DEFICIT: Primary | ICD-10-CM

## 2020-05-29 DIAGNOSIS — G35 MULTIPLE SCLEROSIS (HCC): ICD-10-CM

## 2020-05-29 PROCEDURE — 92507 TX SP LANG VOICE COMM INDIV: CPT | Performed by: SPEECH-LANGUAGE PATHOLOGIST

## 2020-05-29 PROCEDURE — 97112 NEUROMUSCULAR REEDUCATION: CPT | Performed by: PHYSICAL THERAPIST

## 2020-05-29 PROCEDURE — 97110 THERAPEUTIC EXERCISES: CPT | Performed by: PHYSICAL THERAPIST

## 2020-05-29 NOTE — PROGRESS NOTES
Outpatient Speech Language Pathology   Adult Speech Language Cognitive Treatment Note       Patient Name: Yifan Bowman  : 1975  MRN: [id number]  Today's Date: 2020         Visit Date: 2020   Patient Active Problem List   Diagnosis   • MS (multiple sclerosis) (CMS/HCC)   • Iron deficiency anemia secondary to inadequate dietary iron intake   • Muscle spasticity   • Gait abnormality   • Left foot drop          Visit Dx:    ICD-10-CM ICD-9-CM   1. Cognitive communication deficit R41.841 799.52   2. Multiple sclerosis (CMS/HCC) G35 340        Pain ratin-knee pain  Subjective: Pt reports a few stressful and tragic family events that have occurred the past few days.   LTGs   Pt and family will implement compensatory strategies to maximize patient’s memory function so patient can continue to participate in daily activities with 80% accuracy and no cues   -/modeled compensatory strategies for daily tasks at home   Pt will be able to remember information needed to participate in avocational activities with 80% accuracy and no cues   -Targeted STM tasks for participation at home   STGs  Pt’s memory skills will be enhanced as reported by patient by utilizing internal memory strategies to recall up to 3 pieces of functional information after a 5 minute delay with 80% accuracy and no cues   -6/7, 5/5 x2 with 3 minute delay, auditory listening task (5 minutes) then recall: 70%  Pt’s memory skills will be enhanced as reported by patient using external memory aides. with 80% accuracy and no cues   -Modeled bill paying visual aid and how to implement at home  Pt will be independent with financial, appointment, and medicine management in sessions with 100% accuracy with 80% accuracy and no cues   -Pt reports trouble remembering appointment times. Discussed utilizing calendar effectively.     Pt will improve executive functioning skills by using planning strategies prior to beginning tasks with 80% accuracy  and no cues   - modeled planning strategies today. 65%  Pt will improve executive functioning skills by using self-monitoring strategies during functional tasks with 80% accuracy and no cues  - modeled self monitoring strategies throughout tasks. 65%     Recertification: 8/14/2020            OP SLP Education     Row Name 05/29/20 1000       Education    Barriers to Learning  No barriers identified  -RB    Education Provided  Patient demonstrated recommended strategies;Patient requires further education on strategies, risks  -RB    Assessed  Learning needs;Learning motivation;Learning preferences;Learning readiness  -RB    Learning Motivation  Strong  -RB    Learning Method  Explanation;Demonstration  -RB    Teaching Response  Verbalized understanding;Demonstrated understanding;Reinforcement needed  -RB    Education Comments  HEP sent home, visual aids  -RB      User Key  (r) = Recorded By, (t) = Taken By, (c) = Cosigned By    Initials Name Effective Dates    Jada Randolph SLP 02/28/20 -           OP SLP Assessment/Plan - 05/29/20 1000        SLP Assessment    Functional Problems  Speech Language- Adult/Cognition   -RB    Impact on Function: Adult Speech Language/Cognition  Trouble learning or remembering new information;Poor attention to task;Difficulty participating in avocational activities;Restrictions in personal and social life   -RB    Clinical Impression: Speech Language-Adult/Congnition  Mild-Moderate:;Cognitive Communication Impairment   -RB    Functional Problems Comment  Pt's STM deficits impact daily functioning    -RB    Clinical Impression Comments  Pt follows HEP, receptive to strategies   -RB    Please refer to paper survey for additional self-reported information  Yes   -RB    Please refer to items scanned into chart for additional diagnostic informaiton and handouts as provided by clinician  Yes   -RB    SLP Diagnosis  mild-moderate cognitive communication deficit   -RB    Prognosis  Good  (comment)   -RB    Patient/caregiver participated in establishment of treatment plan and goals  Yes   -RB    Patient would benefit from skilled therapy intervention  Yes   -RB       SLP Plan    Frequency  1x/weekly   -RB    Duration  10 weeks   -RB    Planned CPT's?  SLP INDIVIDUAL SPEECH THERAPY: 35602   -RB    Expected Duration Therapy Session - minutes  45-60 minutes   -RB    Plan Comments  continue plan of care   -RB      User Key  (r) = Recorded By, (t) = Taken By, (c) = Cosigned By    Initials Name Provider Type    RB Jada Darling SLP Speech and Language Pathologist                Jada Darling MA CCC-SLP  5/29/2020

## 2020-05-29 NOTE — PROGRESS NOTES
"Physical Therapy Daily Progress Note  Visit: 3  Date of Initial Visit: Type: THERAPY  Noted: 5/15/2020    Yifan Bowman reports: \"I had a cousin pass away and one of my children ran away last night.  I also have not been able to get to the car that has my brace in it.\"    Subjective Evaluation    Pain  Current pain ratin  Location: R knee       Objective   See Exercise, Manual, and Modality Logs for complete treatment.    Exercise 1  Exercise Name 1: NuStep B UE/LEs  Equipment/Resistance 1: level 6  Time: 8 min  Exercise 2  Exercise Name 2: St balance on rocker board R/L and ant/post with grabbing clips and placing them overhead on tband  Equipment/Resistance 2: rocker board; min/mod A; LOB 40% of the time  Sets/Reps 2: 2  Time 2: 10  Exercise 3  Exercise Name 3: Sitting balance on medium swiss ball with B LE marching, TKE, EC with B cervical rot/flex/ext  Equipment/Resistance 3: medium swiss ball; min A  Sets/Reps 3: 10  Exercise 4  Exercise Name 4: Quad opposite UE/LE with 3 sec hold  Equipment/Resistance 4: CGA/min A  Sets/Reps 4: 10  Exercise 5  Exercise Name 5: Ambulation with L foot drop, L cirumduction, and decreased jena.  Pt required HHA to walk out to car  Equipment/Resistance 5: HHA  Sets/Reps 5: 300'          PT Neuro          Assessment & Plan     Assessment  Assessment details: Pt requires min/mod A with standing dynamic balance activities.  Pt has LOB up to 40% of the time and demonstrates L foot drop, L circumduction, and decreased jena.    Plan  Plan details: Pt to benefit from skilled PT services to improve gait, balance, strength, and overall functional mobility.  Pt requires HHA to get back to her vehicle and needs AFO/toe off brace for L foot drop.          Manual Therapy:     0     mins  48120;  Therapeutic Exercise:     15     mins  76092;     Neuromuscular Anita:     25    mins  57189;    Therapeutic Activity:      0     mins  87076;     Gait Trainin     mins  15437;   "   Ultrasound:      0     mins  58412;    Electrical Stimulation:     0     mins  19578 ( );  Dry Needling      0     mins self-pay  Traction      0     mins 47207  Canalith Repositioning    0     mins 79503      Timed Treatment:   40   mins   Total Treatment:     40   mins    Meghan Lopez, PT  [place] License #: [id number]    Physical Therapist

## 2020-06-10 ENCOUNTER — TELEPHONE (OUTPATIENT)
Dept: NEUROLOGY | Facility: CLINIC | Age: 45
End: 2020-06-10

## 2020-06-10 NOTE — TELEPHONE ENCOUNTER
We are in the process of requesting another peer to peer to have the drug approved. I am sending another message to Shahrzad. She needs to continue to take her Aubagio until we can get her started on Tysabri.

## 2020-06-10 NOTE — TELEPHONE ENCOUNTER
PT CALLED AND SAID SHE GOT A LETTER TODAY STATING THAT THE INSURANCE DOES NOT WANT TO COVER HER TYSABRI MEDICATION AND SHE IS WANTING TO KNOW WHAT SHE SHOULD DO FOR NEXT STEPS. PLEASE ADVISE        BEST CALL BACK- [phone]

## 2020-06-12 ENCOUNTER — TREATMENT (OUTPATIENT)
Dept: PHYSICAL THERAPY | Facility: CLINIC | Age: 45
End: 2020-06-12

## 2020-06-12 DIAGNOSIS — G35 MULTIPLE SCLEROSIS (HCC): ICD-10-CM

## 2020-06-12 DIAGNOSIS — R41.841 COGNITIVE COMMUNICATION DEFICIT: Primary | ICD-10-CM

## 2020-06-12 DIAGNOSIS — M21.372 LEFT FOOT DROP: ICD-10-CM

## 2020-06-12 DIAGNOSIS — R26.89 BALANCE PROBLEM: ICD-10-CM

## 2020-06-12 DIAGNOSIS — R26.9 GAIT ABNORMALITY: Primary | ICD-10-CM

## 2020-06-12 PROCEDURE — 97112 NEUROMUSCULAR REEDUCATION: CPT | Performed by: PHYSICAL THERAPIST

## 2020-06-12 PROCEDURE — 97110 THERAPEUTIC EXERCISES: CPT | Performed by: PHYSICAL THERAPIST

## 2020-06-12 PROCEDURE — 92507 TX SP LANG VOICE COMM INDIV: CPT | Performed by: SPEECH-LANGUAGE PATHOLOGIST

## 2020-06-12 NOTE — PROGRESS NOTES
"PT Re-Assessment / Re-Certification        Patient: Yifan Bowman   : 1975  Diagnosis/ICD-10 Code:  Gait abnormality [R26.9]  Referring practitioner: Richard Coronado MD  Date of Initial Visit: Type: THERAPY  Noted: 5/15/2020  Today's Date: 2020  Patient seen for 4 sessions      Subjective:   Yifan Bowman reports: \"I had an appointment at [facility] about my brace yesterday.  The brace I have now hurts my foot on the inside.\"  Subjective Questionnaire: n/a  Clinical Progress: unchanged  Home Program Compliance: Yes  Treatment has included: therapeutic exercise, neuromuscular re-education and gait training    Subjective Evaluation    Pain  Current pain ratin  Location: R hip         Objective     PT Neuro         Assessment & Plan     Assessment  Impairments: abnormal gait, abnormal muscle tone, activity intolerance, impaired balance, impaired physical strength, lacks appropriate home exercise program and safety issue  Other impairment: balance  Assessment details: Pt did not demonstrate any progress in balance today.  Pt currently seeing [facility] about getting another AFO secondary to current one placing pressure along medial arch.  Therapist called [facility] and talked with practioner about getting a new AFO with lateral upright and possibly cutting down along the toe area secondary to pt reporting discomfort in toes when foot plate is longer.  Pt requires mod A with standing balance activities and VCing to not \"lock\" B LE's into genu recurvatum.  Pt has LOB up to 80% of the time and has to touch the walls with ambulation secondary to L foot drop and imbalance.  Pt to benefit form skilled PT services to improve functional mobility.  Prognosis: fair  Functional Limitations: carrying objects, lifting, walking and standing  Goals  Plan Goals: STG (6 visits)  1. Patient to improve STEINER balance score to >/= 41 /56 to decrease client's risk of falls. ONGOING  2. Patient to " perform TUG within 14 sec without LOB for improved functional mobility. ONGOING  3. Patient to ambulate 10 meters without AD within 13 sec without LOB for improved gait jena and functional mobility. ONGOING    LTG (12 visits)  1. Patient to improve STEINER balance score to >/= 48 /56 to decrease client's risk of falls. ONGOING  2. Patient to perform TUG within 13 sec without LOB for improved functional mobility. ONGOING  3. Patient to ambulate 10 meters without AD within 12 sec without LOB for improved gait jean and functional mobility. ONGOING  4. Patient to be I with HEP. ONGOING    Plan  Therapy options: will be seen for skilled physical therapy services  Planned modality interventions: electrical stimulation/Russian stimulation  Planned therapy interventions: fine motor coordination training, gait training, home exercise program, neuromuscular re-education, strengthening, abdominal trunk stabilization, balance/weight-bearing training, motor coordination training and orthotic fitting/training  Frequency: 1x week  Duration in visits: 8  Treatment plan discussed with: patient  Plan details: Patient will be seen 1x/wk x 8 visits with treatment to include strengthening, stretching, functional e-stim therapy, neuromuscular re-education, balance, gait and endurance training.         Visit Diagnoses:    ICD-10-CM ICD-9-CM   1. Gait abnormality R26.9 781.2   2. Balance problem R26.89 781.99   3. Left foot drop M21.372 736.79       Progress toward previous goals: ongoing        Recommendations: Continue as planned  Timeframe: 2 months  Prognosis to achieve goals: fair    PT Signature: Meghan Lopez, PT  [place] License #: [id number]    Based upon review of the patient's progress and continued therapy plan, it is my medical opinion that Yifan Bowman should continue physical therapy treatment at [facility]  [address] [facility]  [address]  [phone].    Signature: __________________________________  Richard Coronado MD    Timed:  Manual Therapy:    0     mins  87839;  Therapeutic Exercise:    15     mins  37015;     Neuromuscular Anita:    25    mins  06806;    Therapeutic Activity:     0     mins  23161;     Gait Trainin     mins  76579;     Ultrasound:     0     mins  24302;    Electrical Stimulation:    0     mins  83326 ( );    Untimed:  Electrical Stimulation:    0     mins  40347 ( );  Mechanical Traction:    0     mins  51331;     Timed Treatment:   40   mins   Total Treatment:     40   mins

## 2020-06-12 NOTE — PROGRESS NOTES
Outpatient Speech Language Pathology   Adult Speech Language Cognitive Progress Note       Patient Name: Yifan Bowman  : 1975  MRN: [id number]  Today's Date: 2020         Visit Date: 2020   Patient Active Problem List   Diagnosis   • MS (multiple sclerosis) (CMS/HCC)   • Iron deficiency anemia secondary to inadequate dietary iron intake   • Muscle spasticity   • Gait abnormality   • Left foot drop          Visit Dx:    ICD-10-CM ICD-9-CM   1. Cognitive communication deficit R41.841 799.52   2. Multiple sclerosis (CMS/HCC) G35 340        Pain ratin-knee pain  Subjective: Pt reports having a better week.   LTGs   Pt and family will implement compensatory strategies to maximize patient’s memory function so patient can continue to participate in daily activities with 80% accuracy and no cues   -modeled compensatory strategies for daily tasks at home. Making gains with independence of strategies.   Pt will be able to remember information needed to participate in avocational activities with 80% accuracy and no cues   -Targeted STM tasks for participation at home for daily tasks    STGs  Pt’s memory skills will be enhanced as reported by patient by utilizing internal memory strategies to recall up to 3 pieces of functional information after a 5 minute delay with 80% accuracy and no cues   -4/4 with 5 minute delay, auditory listening task (5 minutes) then recall: 75%  Pt’s memory skills will be enhanced as reported by patient using external memory aides. with 80% accuracy and no cues   -Pt bought calendar for appointments and for paying bills. Reports 75% usage.   Pt will be independent with financial, appointment, and medicine management in sessions with 100% accuracy with 80% accuracy and no cues   -check writin%, medical bill reading with questions: 100% x2.    Pt will improve executive functioning skills by using planning strategies prior to beginning tasks with 80% accuracy and no  cues   - modeled planning strategies today. 70%  Pt will improve executive functioning skills by using self-monitoring strategies during functional tasks with 80% accuracy and no cues  - modeled self monitoring strategies throughout tasks. 70%     Recertification: 8/14/2020          OP SLP Education     Row Name 06/12/20 1000       Education    Barriers to Learning  No barriers identified  -RB    Education Provided  Patient demonstrated recommended strategies;Patient requires further education on strategies, risks  -RB    Assessed  Learning needs;Learning motivation;Learning preferences;Learning readiness  -RB    Learning Motivation  Strong  -RB    Learning Method  Explanation;Demonstration;Teach back  -RB    Teaching Response  Verbalized understanding;Demonstrated understanding;Reinforcement needed  -RB    Education Comments  discharge planning education   -RB      User Key  (r) = Recorded By, (t) = Taken By, (c) = Cosigned By    Initials Name Effective Dates    RB Jada Darling SLP 02/28/20 -           OP SLP Assessment/Plan - 06/12/20 1000        SLP Assessment    Functional Problems  Speech Language- Adult/Cognition   -RB    Impact on Function: Adult Speech Language/Cognition  Trouble learning or remembering new information;Poor attention to task;Difficulty participating in avocational activities   -RB    Clinical Impression: Speech Language-Adult/Congnition  Mild:;Cognitive Communication Impairment   -RB    Functional Problems Comment  Pt's STM deficits impact her daily funcitoning    -RB    Clinical Impression Comments  Pt is becoming independent with strategies, making gains overall, close to meeting all goals    -RB    Please refer to paper survey for additional self-reported information  Yes   -RB    Please refer to items scanned into chart for additional diagnostic informaiton and handouts as provided by clinician  Yes   -RB    SLP Diagnosis  mild cognitive communication disorder    -RB    Prognosis   Good (comment)   -RB    Patient/caregiver participated in establishment of treatment plan and goals  Yes   -RB    Patient would benefit from skilled therapy intervention  Yes   -RB       SLP Plan    Frequency  1x/weekly   -RB    Duration  2 weeks   -RB    Planned CPT's?  SLP INDIVIDUAL SPEECH THERAPY: 31207   -RB    Expected Duration Therapy Session - minutes  45-60 minutes   -RB    Plan Comments  continue poc    -RB      User Key  (r) = Recorded By, (t) = Taken By, (c) = Cosigned By    Initials Name Provider Type    Jada Randolph SLP Speech and Language Pathologist             SLP Outcome Measures (last 72 hours)      SLP Outcome Measures     Row Name 06/12/20 1000             SLP Outcome Measures    Outcome Measure Used?  Adult NOMS  -RB         Adult FCM Scores    FCM Chosen  Memory  -RB      Memory FCM Score  6  -RB        User Key  (r) = Recorded By, (t) = Taken By, (c) = Cosigned By    Initials Name Effective Dates    Jada Randolph SLP 02/28/20 -               Jada Darling MA CCC-SLP  6/12/2020

## 2020-06-23 ENCOUNTER — TREATMENT (OUTPATIENT)
Dept: PHYSICAL THERAPY | Facility: CLINIC | Age: 45
End: 2020-06-23

## 2020-06-23 DIAGNOSIS — R26.89 BALANCE PROBLEM: ICD-10-CM

## 2020-06-23 DIAGNOSIS — R26.9 GAIT ABNORMALITY: Primary | ICD-10-CM

## 2020-06-23 PROCEDURE — 97112 NEUROMUSCULAR REEDUCATION: CPT | Performed by: PHYSICAL THERAPIST

## 2020-06-23 PROCEDURE — 97110 THERAPEUTIC EXERCISES: CPT | Performed by: PHYSICAL THERAPIST

## 2020-06-23 NOTE — PROGRESS NOTES
"Physical Therapy Daily Progress Note  Visit: 5  Date of Initial Visit: Type: THERAPY  Noted: 5/15/2020    Yifan Bowman reports: \"My right knee hurts today.  I woke up with it hurting.\"    Subjective Evaluation    Pain  Current pain ratin  Location: R knee           Objective   See Exercise, Manual, and Modality Logs for complete treatment.    Exercise 1  Exercise Name 1: NuStep B UE/LEs  Equipment/Resistance 1: level 6  Time: 8 min  Exercise 2  Exercise Name 2: Holding tandem on beam with mini-squats; B sidestepping along beam without UE A, hold standing sideways with mini-squats; fwd tandem walking along beam with B UE A with emphasis on B hip/knee flexion  Equipment/Resistance 2: balance beam; mod A  Sets/Reps 2: 10  Exercise 3  Exercise Name 3: St B knee flexion with min A with emphasis on B knee flexion  Equipment/Resistance 3: min/mod A  Sets/Reps 3: 10  Exercise 4  Exercise Name 4: St balance on blue foam in reg DANELLE, B staggered standing with EC  Equipment/Resistance 4: blue foam; min/mod A; LOB 80% of the time  Sets/Reps 4: 5          PT Neuro          Assessment & Plan     Assessment  Assessment details: Pt requires mod A with standing balance activities and emphasis on B hip and knee flexion to decrease B genu recurvatum.  Called [facility] about pts brace fitting and they report that they are waiting on MD office and insurance.  Pt continues to demonstrate L foot drop and waddling gait secondary to decreased foot clearance with swing.  Pt to benefit from skilled PT services to improve gait, balance, strength, transfers and overall functional mobility.    Plan  Plan details: Pt to continue with PT services to improve gait, balance, strength, transfers and overall functional mobility.          Manual Therapy:     0     mins  19755;  Therapeutic Exercise:     15     mins  77689;     Neuromuscular Anita:     15    mins  50875;    Therapeutic Activity:      0     mins  09658;     Gait Training:       " 0     mins  16766;     Ultrasound:      0     mins  03235;    Electrical Stimulation:     0     mins  03019 ( );  Dry Needling      0     mins self-pay  Traction      0     mins 70784  Canalith Repositioning    0     mins 42170      Timed Treatment:   30   mins   Total Treatment:     30   mins    Meghan Lopez, PT  [place] License #: [id number]    Physical Therapist

## 2020-06-25 ENCOUNTER — OFFICE VISIT (OUTPATIENT)
Dept: NEUROLOGY | Facility: CLINIC | Age: 45
End: 2020-06-25

## 2020-06-25 VITALS
SYSTOLIC BLOOD PRESSURE: 134 MMHG | WEIGHT: 199 LBS | OXYGEN SATURATION: 97 % | HEART RATE: 90 BPM | HEIGHT: 62 IN | DIASTOLIC BLOOD PRESSURE: 84 MMHG | BODY MASS INDEX: 36.62 KG/M2

## 2020-06-25 DIAGNOSIS — M21.372 LEFT FOOT DROP: ICD-10-CM

## 2020-06-25 DIAGNOSIS — D50.8 IRON DEFICIENCY ANEMIA SECONDARY TO INADEQUATE DIETARY IRON INTAKE: ICD-10-CM

## 2020-06-25 DIAGNOSIS — R26.9 GAIT ABNORMALITY: ICD-10-CM

## 2020-06-25 DIAGNOSIS — G35 MS (MULTIPLE SCLEROSIS) (HCC): ICD-10-CM

## 2020-06-25 DIAGNOSIS — M62.838 MUSCLE SPASTICITY: ICD-10-CM

## 2020-06-25 PROCEDURE — 99214 OFFICE O/P EST MOD 30 MIN: CPT | Performed by: NURSE PRACTITIONER

## 2020-06-25 NOTE — PROGRESS NOTES
Subjective:     Patient ID: Yifan Bowman is a 45 y.o. female.  Chief Complaint   Patient presents with   • Multiple Sclerosis     2 month follow up        History of Present Illness     45 y.o. woman returns in follow up for RRMS, spasticity and Iron def anemia. Last visit on 5/1/20 continued Aubagio 14 mg daily, ordered labs, MRI brain, continued Ampyra, LVT and Iron. Ordered PT/SLP.  Discussed switching to Tysabri LEIDY due to declining clinic metrics.     MRI Brain, my review of images, 5/12/20 as compared to, 4/25/19, no new/enlarging/enhancinglesions, moderate T2 PVWM, mild atrophy.     Labs 5/1/20 CMP - NCS   HGB - 10.0  - instructed patient that she needs to take her Iron supplements more consistently.   JCV - 3.75     Insurance has not approved LEIDY Tysabri due to positive JCV status, we have been unsuccessful with our multiple attempts to set up a peer to peer, and still no word on the appeal.     RRMS  Awaiting new AFO to be made by [facility].     Brand Ampyra improved walking. Does not use cane/rollator.     Fatigue is mild.  Heat intolerance is severe, worsening with necessary usage of masks due to COVID.      Continued left 1 - 3 digits mild numbness.     Has been doing PT/SLP with mild improvement.      Left leg spasticity    LVT stable spasticity  stiffness in left leg.   Denies side effects of Keppra    Anemia  States she is taking her Ferrous Sulfate regularly.     Problem history:    Previously dx and followed by Dr Franklin.  7 years ago fell off porch for unknown reasons.  Left hip is painful and weak.  Pain present when walking.  Left weakness has worsened.  Sx started two months after delivery of 4th child. Band like pain and pressure around lower abdomen.  Noticeable fatigue.  Heat increases sleepiness.  Bladder frequency.  Numbness medial left calf and fingertips.  Started on Aubagio without side effects.      Reviewed Dr Franklin's notes:    Presented on 2/7/17 for left LE pain,  numbness and weakness for 6 years.  Also, notes intermittent hand numbness.  MRI results below.  Started on Prednisone and Aubagio 7 mg on 17  Labs - ACE, Lyme, LUCY, CMP, CBC, TSH, CRP reviewed    My review of films:  MRI Brain 2/15/17 3/30/17  Large left anterior CC black hole, 10 to 15 T2 lesions, multiple CC lesions. No evidence of enhancement    MRI C-spine 3/30/17 C4/5 T2 cord signal w/o enhancement    The following portions of the patient's history were reviewed and updated as appropriate:   She  has a past medical history of MS (multiple sclerosis) (CMS/[facility]) (3/23/2017).  She  has a past surgical history that includes Reduction mammaplasty and  section.  Her family history includes Cancer in her paternal grandfather.  She  reports that she has never smoked. She has never used smokeless tobacco. She reports that she does not drink alcohol or use drugs.  Current Outpatient Medications   Medication Sig Dispense Refill   • AMPYRA 10 MG tablet sustained-release 12 hour TAKE 1 TABLET BY MOUTH EVERY 12 HOURS 180 tablet 3   • ferrous sulfate 325 (65 FE) MG tablet Take 325 mg by mouth Daily With Breakfast.     • levETIRAcetam XR (KEPPRA XR) 500 MG 24 hr tablet TAKE 2 TABLETS BY MOUTH ONCE DAILY. 180 tablet 2   • Teriflunomide (AUBAGIO) 14 MG tablet Take 14 mg by mouth Daily. 90 tablet 3     No current facility-administered medications for this visit.      Current Outpatient Medications on File Prior to Visit   Medication Sig   • AMPYRA 10 MG tablet sustained-release 12 hour TAKE 1 TABLET BY MOUTH EVERY 12 HOURS   • ferrous sulfate 325 (65 FE) MG tablet Take 325 mg by mouth Daily With Breakfast.   • levETIRAcetam XR (KEPPRA XR) 500 MG 24 hr tablet TAKE 2 TABLETS BY MOUTH ONCE DAILY.   • Teriflunomide (AUBAGIO) 14 MG tablet Take 14 mg by mouth Daily.     No current facility-administered medications on file prior to visit.      She is allergic to benadryl [diphenhydramine]..    Review of Systems    Constitutional: Negative for activity change and unexpected weight change.   HENT: Negative for tinnitus and trouble swallowing.    Eyes: Negative for photophobia and visual disturbance.   Respiratory: Negative for apnea and choking.    Cardiovascular: Negative for leg swelling.   Endocrine: Positive for heat intolerance. Negative for cold intolerance.   Genitourinary: Positive for frequency. Negative for difficulty urinating, menstrual problem and urgency.   Musculoskeletal: Positive for gait problem. Negative for back pain.   Skin: Negative for color change.   Allergic/Immunologic: Negative for immunocompromised state.   Neurological: Negative for dizziness, tremors, seizures, syncope, facial asymmetry, speech difficulty and light-headedness.   Hematological: Negative for adenopathy. Does not bruise/bleed easily.   Psychiatric/Behavioral: Negative for behavioral problems, confusion, decreased concentration, hallucinations and sleep disturbance.        Objective:  Vitals:    06/25/20 0847   BP: 134/84   Pulse: 90   SpO2: 97%   ,  Neurologic Exam     Mental Status   Registration: recalls 3 of 3 objects. Recall at 5 minutes: recalls 3 of 3 objects. Follows 3 step commands.   Attention: normal. Concentration: normal.   Level of consciousness: alert  Knowledge: good and consistent with education.   Able to name object. Able to read. Able to repeat. Able to write. Normal comprehension.     Cranial Nerves     CN II   Visual fields full to confrontation.   Visual acuity: normal  Right visual field deficit: none  Left visual field deficit: none     CN III, IV, VI   Right pupil: Shape: regular. Reactivity: brisk. Consensual response: intact.   Left pupil: Shape: regular. Reactivity: brisk. Consensual response: intact.   Nystagmus: none   Diplopia: none  Ophthalmoparesis: none  Upgaze: normal  Downgaze: normal  Conjugate gaze: present  Vestibulo-ocular reflex: present    CN V   Facial sensation intact.   Right corneal  reflex: normal  Left corneal reflex: normal    CN VII   Right facial weakness: none  Left facial weakness: none    CN VIII   Hearing: intact    CN IX, X   Palate: symmetric  Right gag reflex: normal  Left gag reflex: normal    CN XI   Right sternocleidomastoid strength: normal  Left sternocleidomastoid strength: normal    CN XII   Tongue: not atrophic  Fasciculations: absent  Tongue deviation: none    Motor Exam   Muscle bulk: normal  Overall muscle tone: normal  Right arm tone: normal  Left arm tone: normal  Right leg tone: normal  Left leg tone: increased    Strength   Strength 5/5 except as noted.   Left iliopsoas: 5/5  Left quadriceps: 5/5  Left hamstrin/5  Left glutei: 5/5  Left anterior tibial: 4/5  Left posterior tibial: 4/5  Left peroneal: 4/5  Left gastroc: 4/5    Sensory Exam   Light touch normal.   Vibration normal.   Proprioception normal.   Pinprick normal.         Gait, Coordination, and Reflexes     Gait  Gait: circumduction and spastic (left leg, left foot drop, using walls to steady while ambulate)    Coordination   Tandem walking coordination: abnormal    Tremor   Resting tremor: absent  Intention tremor: absent  Action tremor: absent    Reflexes   Right brachioradialis: 3+  Left brachioradialis: 3+  Right biceps: 3+  Left biceps: 3+  Right triceps: 3+  Left triceps: 3+  Right patellar: 3+  Left patellar: 4+  Right achilles: 3+  Left achilles: 4+  Right ankle clonus: present  Left ankle clonus: present      Physical Exam   Constitutional: She appears well-developed and well-nourished.   Neurological: She has an abnormal Tandem Gait Test.   Reflex Scores:       Tricep reflexes are 3+ on the right side and 3+ on the left side.       Bicep reflexes are 3+ on the right side and 3+ on the left side.       Brachioradialis reflexes are 3+ on the right side and 3+ on the left side.       Patellar reflexes are 3+ on the right side and 4+ on the left side.       Achilles reflexes are 3+ on the right side  and 4+ on the left side.  Nursing note and vitals reviewed.      Assessment/Plan    Problem List Items Addressed This Visit        Nervous and Auditory    MS (multiple sclerosis) (CMS/[facility]) - Primary    Current Assessment & Plan     Lengthy discussion with patient on pursuing Tysabri versus starting Ocrevus. Patient is not able to self-quarantine at home during the COVID outbreak, and would be high risk of beau COVID. Due to this we both feel she would not be a good candidate for Ocrevus until vaccinated against COVID in the future.     Still awaiting Peer to peer for LEIDY Tysabri, patient is continuing to take Aubagio during the interim. Discussed case with financial counselor/coordinator Shahrzad. He has been calling insurance multiple times.     Patient denies new or worsening sx.     Continue PT/SLP     Given information on cooling apparel for summer.             Musculoskeletal and Integument    Muscle spasticity    Current Assessment & Plan     Continue Keppra             Hematopoietic and Hemostatic    Iron deficiency anemia secondary to inadequate dietary iron intake    Current Assessment & Plan     Continue ferrous sulfate          Relevant Medications    ferrous sulfate 325 (65 FE) MG tablet       Other    Gait abnormality    Current Assessment & Plan     Continue Ampyra          Left foot drop    Current Assessment & Plan     Encouraged patient to use a cane or rollator until she receives her new AFO.            Return in about 3 months (around 9/25/2020).

## 2020-06-25 NOTE — ASSESSMENT & PLAN NOTE
Lengthy discussion with patient on pursuing Tysabri versus starting Ocrevus. Patient is not able to self-quarantine at home during the COVID outbreak, and would be high risk of beau COVID. Due to this we both feel she would not be a good candidate for Ocrevus until vaccinated against COVID in the future.     Still awaiting Peer to peer for LEIDY Tysabri, patient is continuing to take Aubagio during the interim. Discussed case with financial counselor/coordinator Shahrzad. He has been calling insurance multiple times.     Patient denies new or worsening sx.     Continue PT/SLP     Given information on cooling apparel for summer.

## 2020-06-26 ENCOUNTER — TREATMENT (OUTPATIENT)
Dept: PHYSICAL THERAPY | Facility: CLINIC | Age: 45
End: 2020-06-26

## 2020-06-26 DIAGNOSIS — R41.841 COGNITIVE COMMUNICATION DEFICIT: Primary | ICD-10-CM

## 2020-06-26 DIAGNOSIS — G35 MULTIPLE SCLEROSIS (HCC): ICD-10-CM

## 2020-06-26 PROCEDURE — 92507 TX SP LANG VOICE COMM INDIV: CPT | Performed by: SPEECH-LANGUAGE PATHOLOGIST

## 2020-06-26 NOTE — PROGRESS NOTES
Outpatient Speech Language Pathology   Adult Speech Language Cognitive Treatment Note/Discharge Summary       Patient Name: Yifan Bowman  : 1975  MRN: [id number]  Today's Date: 2020         Visit Date: 2020   Patient Active Problem List   Diagnosis   • MS (multiple sclerosis) (CMS/HCC)   • Iron deficiency anemia secondary to inadequate dietary iron intake   • Muscle spasticity   • Gait abnormality   • Left foot drop          Visit Dx:    ICD-10-CM ICD-9-CM   1. Cognitive communication deficit R41.841 799.52   2. Multiple sclerosis (CMS/HCC) G35 340         Pain ratin-knee pain  Subjective: Pt reports no new concerns.   LTGs   Pt and family will implement compensatory strategies to maximize patient’s memory function so patient can continue to participate in daily activities with 80% accuracy and no cues   -modeled compensatory strategies for daily tasks at home. Independent with strategies. All STGs MET.   Pt will be able to remember information needed to participate in avocational activities with 80% accuracy and no cues   -Targeted STM tasks for participation at home for daily tasks. PT has met all STGs at 80% or higher    STGs  Pt’s memory skills will be enhanced as reported by patient by utilizing internal memory strategies to recall up to 3 pieces of functional information after a 5 minute delay with 80% accuracy and no cues   -Functional paragraph recall 100%, PT exercise recall: 80%, name recall: 100%. MET  Pt’s memory skills will be enhanced as reported by patient using external memory aides. with 80% accuracy and no cues   -Pt reports 95-90% usage. MET.   Pt will be independent with financial, appointment, and medicine management in sessions with 100% accuracy with 80% accuracy and no cues   -grocery budgeting task: 100%. Reports paying bills on time, managing appointments, and medicines with visual aids. MET.   Pt will improve executive functioning skills by using planning  strategies prior to beginning tasks with 80% accuracy and no cues   - modeled planning strategies today. 80%. MET  Pt will improve executive functioning skills by using self-monitoring strategies during functional tasks with 80% accuracy and no cues  - modeled self monitoring strategies throughout tasks. 80%. MET     Recertification: 8/14/2020            OP SLP Education     Row Name 06/26/20 0900       Education    Barriers to Learning  No barriers identified  -RB    Education Provided  Patient demonstrated recommended strategies  -RB    Assessed  Learning needs;Learning motivation;Learning preferences;Learning readiness  -RB    Learning Motivation  Strong  -RB    Learning Method  Explanation;Demonstration  -RB    Teaching Response  Verbalized understanding;Demonstrated understanding  -RB    Education Comments  discharge education, HEP  -RB      User Key  (r) = Recorded By, (t) = Taken By, (c) = Cosigned By    Initials Name Effective Dates    Jada Randolph SLP 02/28/20 -           OP SLP Assessment/Plan - 06/26/20 0800        SLP Assessment    Clinical Impression Comments  Pt independent with strategies, functioning at home with cognitive ADL tasks, no skilled therapy warranted at this time    -RB    SLP Diagnosis  mild cognitive communication disorder   -RB       SLP Plan    Plan Comments  discharge, follow HEP   -RB      User Key  (r) = Recorded By, (t) = Taken By, (c) = Cosigned By    Initials Name Provider Type    Jada Randolph, SLP Speech and Language Pathologist             SLP Outcome Measures (last 72 hours)      SLP Outcome Measures     Row Name 06/26/20 0900             SLP Outcome Measures    Outcome Measure Used?  Adult NOMS  -RB         Adult FCM Scores    FCM Chosen  Memory  -RB      Memory FCM Score  6  -RB        User Key  (r) = Recorded By, (t) = Taken By, (c) = Cosigned By    Initials Name Effective Dates    Jada Randolph SLP 02/28/20 -               OP SLP Discharge Summary  Date of  Discharge: 06/26/20  Reason for Discharge: all goals and outcomes met, no further needs identified  Progress Toward Achieving Short/long Term Goals: all goals met within established timelines  Discharge Destination: home  Discharge Instructions: follow up as needed, follow HEP      Jada Darling MA CCC-SLP  6/26/2020

## 2020-06-30 ENCOUNTER — TELEPHONE (OUTPATIENT)
Dept: NEUROLOGY | Facility: CLINIC | Age: 45
End: 2020-06-30

## 2020-07-07 ENCOUNTER — TELEPHONE (OUTPATIENT)
Dept: NEUROLOGY | Facility: CLINIC | Age: 45
End: 2020-07-07

## 2020-07-07 NOTE — TELEPHONE ENCOUNTER
ARIEL FROM [facility] CALLED SAYING SHE NEVER RECEIVED THE ORDER AND SHE IS REQUESTING IT BE RE FAXED OVER. PER PREVIOUS ENCOUNTER, FAX WAS SUCCESSFUL ON July 1ST. I VERIFIED FAX NUMBER WITH ARIEL. PLEASE RE FAX ORDER      FAX- [phone]

## 2020-07-09 ENCOUNTER — TELEPHONE (OUTPATIENT)
Dept: NEUROLOGY | Facility: CLINIC | Age: 45
End: 2020-07-09

## 2020-07-09 DIAGNOSIS — G35 MS (MULTIPLE SCLEROSIS) (HCC): Primary | ICD-10-CM

## 2020-07-09 NOTE — TELEPHONE ENCOUNTER
PT CALLED RE: STARTING INFUSIONS NEXT WEEK, WANTS TO KNOW IF SHE IS SUPPOSED TO CONTINUE TAKING AUBAGIO.    PLEASE CALL BACK: [phone], LEAVE VM IF NO ANSWER.

## 2020-07-13 NOTE — TELEPHONE ENCOUNTER
PT CALLED WANTING TO KNOW IF SHE ALSO NEEDS TO STOP TAKING THE AMPYRA AND KEPPRA OR IF IT'S OK TO CONTINUE. PLEASE ADVISE

## 2020-07-14 NOTE — TELEPHONE ENCOUNTER
Patient informed to continue Ampyra and Keppra. And stop Aubagio only. Patient verbalized understanding.  -TMT 07/14/2020

## 2020-07-17 ENCOUNTER — INFUSION (OUTPATIENT)
Dept: ONCOLOGY | Facility: HOSPITAL | Age: 45
End: 2020-07-17

## 2020-07-17 VITALS
TEMPERATURE: 98.5 F | DIASTOLIC BLOOD PRESSURE: 88 MMHG | SYSTOLIC BLOOD PRESSURE: 137 MMHG | WEIGHT: 199 LBS | BODY MASS INDEX: 36.4 KG/M2 | HEART RATE: 108 BPM | RESPIRATION RATE: 22 BRPM

## 2020-07-17 DIAGNOSIS — G35 MS (MULTIPLE SCLEROSIS) (HCC): Primary | ICD-10-CM

## 2020-07-17 PROCEDURE — 96365 THER/PROPH/DIAG IV INF INIT: CPT

## 2020-07-17 PROCEDURE — 25010000002 NATALIZUMAB PER 1 MG: Performed by: PSYCHIATRY & NEUROLOGY

## 2020-07-17 RX ORDER — ACETAMINOPHEN 325 MG/1
650 TABLET ORAL ONCE
Status: CANCELLED | OUTPATIENT
Start: 2020-07-20

## 2020-07-17 RX ORDER — SODIUM CHLORIDE 9 MG/ML
250 INJECTION, SOLUTION INTRAVENOUS ONCE
Status: CANCELLED | OUTPATIENT
Start: 2020-07-20

## 2020-07-17 RX ORDER — CETIRIZINE HYDROCHLORIDE 10 MG/1
10 TABLET ORAL ONCE
Status: COMPLETED | OUTPATIENT
Start: 2020-07-17 | End: 2020-07-17

## 2020-07-17 RX ORDER — CETIRIZINE HYDROCHLORIDE 10 MG/1
10 TABLET ORAL ONCE
Status: CANCELLED
Start: 2020-07-20

## 2020-07-17 RX ORDER — ACETAMINOPHEN 325 MG/1
650 TABLET ORAL ONCE
Status: COMPLETED | OUTPATIENT
Start: 2020-07-17 | End: 2020-07-17

## 2020-07-17 RX ADMIN — CETIRIZINE HYDROCHLORIDE 10 MG: 10 TABLET, FILM COATED ORAL at 13:26

## 2020-07-17 RX ADMIN — ACETAMINOPHEN 650 MG: 325 TABLET ORAL at 13:26

## 2020-07-17 RX ADMIN — NATALIZUMAB 300 MG: 300 INJECTION INTRAVENOUS at 13:27

## 2020-07-20 ENCOUNTER — TREATMENT (OUTPATIENT)
Dept: PHYSICAL THERAPY | Facility: CLINIC | Age: 45
End: 2020-07-20

## 2020-07-20 DIAGNOSIS — R26.89 BALANCE PROBLEM: ICD-10-CM

## 2020-07-20 DIAGNOSIS — M21.372 LEFT FOOT DROP: ICD-10-CM

## 2020-07-20 DIAGNOSIS — R26.9 GAIT ABNORMALITY: Primary | ICD-10-CM

## 2020-07-20 PROCEDURE — 97116 GAIT TRAINING THERAPY: CPT | Performed by: PHYSICAL THERAPIST

## 2020-07-20 PROCEDURE — 97112 NEUROMUSCULAR REEDUCATION: CPT | Performed by: PHYSICAL THERAPIST

## 2020-07-20 PROCEDURE — 97110 THERAPEUTIC EXERCISES: CPT | Performed by: PHYSICAL THERAPIST

## 2020-07-20 NOTE — PROGRESS NOTES
"PT Re-Assessment / Re-Certification        Patient: Yifan Bowman   : 1975  Diagnosis/ICD-10 Code:  Gait abnormality [R26.9]  Referring practitioner: Richard Coronado MD  Date of Initial Visit: Type: THERAPY  Noted: 5/15/2020  Today's Date: 2020  Patient seen for 6 sessions      Subjective:   Yifan Bowman reports: \"I had my first infusion and I may have felt a little bit better.  They said that I should feel something after the second one.\"  Subjective Questionnaire: n/a  Clinical Progress: worse  Home Program Compliance: Yes  Treatment has included: therapeutic exercise, neuromuscular re-education and gait training    Subjective Evaluation    Pain  Current pain ratin  Location: R knee         Objective     PT Neuro         Assessment & Plan     Assessment  Impairments: abnormal gait, abnormal muscle tone, activity intolerance, impaired balance, impaired physical strength, lacks appropriate home exercise program and safety issue  Other impairment: balance  Assessment details: Pt reports not being able to go back to [facility] to get her toe off brace adjusted.  Pt issued rollator to take home and for daily use.  Pt did not meet any goals today and demonstrated functional regression today.  Pt reports not taking her Ampyra this morning.  Pt to utilize rollator at home and report if she has improved stability using the AD.  Pt to benefit from skilled PT services to improve functional mobility.  Prognosis: fair  Functional Limitations: carrying objects, lifting, walking and standing  Goals  Plan Goals: STG (6 visits)  1. Patient to improve STEINER balance score to >/= 41 /56 to decrease client's risk of falls. ONGOING  2. Patient to perform TUG within 14 sec without LOB for improved functional mobility. ONGOING  3. Patient to ambulate 10 meters without AD within 13 sec without LOB for improved gait jena and functional mobility. ONGOING    LTG (12 visits)  1. Patient to improve STEINER " balance score to >/= 48 /56 to decrease client's risk of falls. ONGOING  2. Patient to perform TUG within 13 sec without LOB for improved functional mobility. ONGOING  3. Patient to ambulate 10 meters without AD within 12 sec without LOB for improved gait jena and functional mobility. ONGOING  4. Patient to be I with HEP. ONGOING    Plan  Therapy options: will be seen for skilled physical therapy services  Planned modality interventions: electrical stimulation/Russian stimulation  Planned therapy interventions: fine motor coordination training, gait training, home exercise program, neuromuscular re-education, strengthening, abdominal trunk stabilization, balance/weight-bearing training, motor coordination training and orthotic fitting/training  Frequency: 1x week  Duration in visits: 6  Treatment plan discussed with: patient  Plan details: Patient will be seen 1x/wk x 6 visits with treatment to include strengthening, stretching, functional e-stim therapy, neuromuscular re-education, balance, gait and endurance training.         Visit Diagnoses:    ICD-10-CM ICD-9-CM   1. Gait abnormality R26.9 781.2   2. Balance problem R26.89 781.99   3. Left foot drop M21.372 736.79       Progress toward previous goals: Not Met          Recommendations: Continue as planned  Timeframe: 6 weeks  Prognosis to achieve goals: fair    PT Signature: Meghan Lopez, PT  [place] License #: [id number]    Based upon review of the patient's progress and continued therapy plan, it is my medical opinion that Yifan Bowman should continue physical therapy treatment at [facility]  [address] [facility] [id number] [phone].    Signature: __________________________________  Richard Coronado MD    Timed:  Manual Therapy:    0     mins  67681;  Therapeutic Exercise:    15     mins  53464;     Neuromuscular Anita:    15    mins  92708;    Therapeutic Activity:     0     mins  26184;      Gait Training:      15     mins  93952;     Ultrasound:     0     mins  88789;    Electrical Stimulation:    0     mins  07626 ( );    Untimed:  Electrical Stimulation:    0     mins  39001 ( );  Mechanical Traction:    0     mins  64514;     Timed Treatment:   45   mins   Total Treatment:     45   mins

## 2020-07-29 ENCOUNTER — APPOINTMENT (OUTPATIENT)
Dept: MRI IMAGING | Facility: HOSPITAL | Age: 45
End: 2020-07-29

## 2020-08-04 ENCOUNTER — TELEPHONE (OUTPATIENT)
Dept: NEUROLOGY | Facility: CLINIC | Age: 45
End: 2020-08-04

## 2020-08-04 DIAGNOSIS — G35 MS (MULTIPLE SCLEROSIS) (HCC): Primary | ICD-10-CM

## 2020-08-04 RX ORDER — ALPRAZOLAM 1 MG/1
TABLET ORAL
Qty: 2 TABLET | Refills: 0 | OUTPATIENT
Start: 2020-08-04 | End: 2020-08-14 | Stop reason: SDUPTHER

## 2020-08-04 NOTE — TELEPHONE ENCOUNTER
THE PT CALLED AND STATED SHE HAS A UPCOMING MRI ON 08/14 AND SHE NEEDS  SOMETHING TO RELAX HER AS SHE IS CLAUSTROPHOBIC AND WOULD LIKE THAT SENT TO THE [facility] ON  [facility] .

## 2020-08-04 NOTE — TELEPHONE ENCOUNTER
Called 1x. No answer. I LVM informing patient that Dr. Coronado called in Xanax to take 30 minutes prior to MRI. I provided my name and call back number.   -TMT 08/04/2020

## 2020-08-14 ENCOUNTER — HOSPITAL ENCOUNTER (OUTPATIENT)
Dept: MRI IMAGING | Facility: HOSPITAL | Age: 45
Discharge: HOME | End: 2020-08-14
Admitting: PSYCHIATRY & NEUROLOGY

## 2020-08-14 ENCOUNTER — HOSPITAL ENCOUNTER (OUTPATIENT)
Dept: MRI IMAGING | Facility: HOSPITAL | Age: 45
Discharge: HOME | End: 2020-08-14

## 2020-08-14 DIAGNOSIS — G35 MS (MULTIPLE SCLEROSIS) (HCC): ICD-10-CM

## 2020-08-14 PROCEDURE — 0 GADOBENATE DIMEGLUMINE 529 MG/ML SOLUTION: Performed by: PSYCHIATRY & NEUROLOGY

## 2020-08-14 PROCEDURE — 72156 MRI NECK SPINE W/O & W/DYE: CPT

## 2020-08-14 PROCEDURE — 72157 MRI CHEST SPINE W/O & W/DYE: CPT

## 2020-08-14 PROCEDURE — A9577 INJ MULTIHANCE: HCPCS | Performed by: PSYCHIATRY & NEUROLOGY

## 2020-08-14 RX ORDER — ALPRAZOLAM 1 MG/1
TABLET ORAL
Qty: 2 TABLET | Refills: 0 | Status: SHIPPED | OUTPATIENT
Start: 2020-08-14 | End: 2020-10-02

## 2020-08-14 RX ADMIN — GADOBENATE DIMEGLUMINE 19 ML: 529 INJECTION, SOLUTION INTRAVENOUS at 14:40

## 2020-08-21 ENCOUNTER — TREATMENT (OUTPATIENT)
Dept: PHYSICAL THERAPY | Facility: CLINIC | Age: 45
End: 2020-08-21

## 2020-08-21 DIAGNOSIS — R26.89 BALANCE PROBLEM: ICD-10-CM

## 2020-08-21 DIAGNOSIS — R26.9 GAIT ABNORMALITY: Primary | ICD-10-CM

## 2020-08-21 PROCEDURE — 97110 THERAPEUTIC EXERCISES: CPT | Performed by: PHYSICAL THERAPIST

## 2020-08-21 PROCEDURE — 97112 NEUROMUSCULAR REEDUCATION: CPT | Performed by: PHYSICAL THERAPIST

## 2020-08-21 NOTE — PROGRESS NOTES
"PT Re-Assessment / Re-Certification        Patient: Yifan Bowman   : 1975  Diagnosis/ICD-10 Code:  Gait abnormality [R26.9]  Referring practitioner: Richard Coronado MD  Date of Initial Visit: Type: THERAPY  Noted: 5/15/2020  Today's Date: 2020  Patient seen for 7 sessions      Subjective:   Yifan Bowman reports: \"I've been using my walker.  I haven't had any falls.\"  Subjective Questionnaire: n/a  Clinical Progress: improved  Home Program Compliance: Yes  Treatment has included: therapeutic exercise, neuromuscular re-education and gait training    Subjective   Objective     PT Neuro         Assessment & Plan     Assessment  Impairments: abnormal gait, abnormal muscle tone, activity intolerance, impaired balance, impaired physical strength, lacks appropriate home exercise program and safety issue  Other impairment: balance  Assessment details: Pt did not meet any goals today but did make progress towards goals.    Prognosis: fair  Functional Limitations: carrying objects, lifting, walking and standing  Goals  Plan Goals: STG (6 visits)  1. Patient to improve STEINER balance score to >/= 41 /56 to decrease client's risk of falls. ONGOING  2. Patient to perform TUG within 14 sec without LOB for improved functional mobility. ONGOING  3. Patient to ambulate 10 meters without AD within 13 sec without LOB for improved gait jena and functional mobility. ONGOING    LTG (12 visits)  1. Patient to improve STEINER balance score to >/= 48 /56 to decrease client's risk of falls. ONGOING  2. Patient to perform TUG within 13 sec without LOB for improved functional mobility. ONGOING  3. Patient to ambulate 10 meters without AD within 12 sec without LOB for improved gait jena and functional mobility. ONGOING  4. Patient to be I with HEP. ONGOING    Plan  Therapy options: will be seen for skilled physical therapy services  Planned modality interventions: electrical stimulation/Russian stimulation  Planned " therapy interventions: fine motor coordination training, gait training, home exercise program, neuromuscular re-education, strengthening, abdominal trunk stabilization, balance/weight-bearing training, motor coordination training and orthotic fitting/training  Frequency: 1x week  Duration in visits: 5  Treatment plan discussed with: patient  Plan details: Patient will be seen 1x/wk x 5 visits with treatment to include strengthening, stretching, functional e-stim therapy, neuromuscular re-education, balance, gait and endurance training.         Visit Diagnoses:    ICD-10-CM ICD-9-CM   1. Gait abnormality R26.9 781.2   2. Balance problem R26.89 781.99       Progress toward previous goals: Not Met       Recommendations: Continue as planned  Timeframe: 5 weeks  Prognosis to achieve goals: fair    PT Signature: Meghan Lopez, PT  [place] License #: [id number]    Based upon review of the patient's progress and continued therapy plan, it is my medical opinion that Yifan Bowman should continue physical therapy treatment at [facility]  [address] [facility] [id number] [phone].    Signature: __________________________________  Richard Coronado MD    Timed:  Manual Therapy:    0     mins  72344;  Therapeutic Exercise:    10     mins  67177;     Neuromuscular Anita:    20    mins  66904;    Therapeutic Activity:     0     mins  82245;     Gait Trainin     mins  32718;     Ultrasound:     0     mins  31200;    Electrical Stimulation:    0     mins  71672 ( );    Untimed:  Electrical Stimulation:    0     mins  85152 ( );  Mechanical Traction:    0     mins  05047;     Timed Treatment:   30   mins   Total Treatment:     30   mins

## 2020-08-28 ENCOUNTER — INFUSION (OUTPATIENT)
Dept: ONCOLOGY | Facility: HOSPITAL | Age: 45
End: 2020-08-28

## 2020-08-28 VITALS
DIASTOLIC BLOOD PRESSURE: 69 MMHG | SYSTOLIC BLOOD PRESSURE: 130 MMHG | TEMPERATURE: 97.1 F | HEART RATE: 103 BPM | RESPIRATION RATE: 18 BRPM

## 2020-08-28 DIAGNOSIS — G35 MS (MULTIPLE SCLEROSIS) (HCC): Primary | ICD-10-CM

## 2020-08-28 PROCEDURE — 96365 THER/PROPH/DIAG IV INF INIT: CPT

## 2020-08-28 PROCEDURE — 25010000002 NATALIZUMAB PER 1 MG: Performed by: PSYCHIATRY & NEUROLOGY

## 2020-08-28 RX ORDER — SODIUM CHLORIDE 9 MG/ML
250 INJECTION, SOLUTION INTRAVENOUS ONCE
Status: CANCELLED | OUTPATIENT
Start: 2020-08-31

## 2020-08-28 RX ORDER — CETIRIZINE HYDROCHLORIDE 10 MG/1
10 TABLET ORAL ONCE
Status: CANCELLED
Start: 2020-08-31

## 2020-08-28 RX ORDER — CETIRIZINE HYDROCHLORIDE 10 MG/1
10 TABLET ORAL ONCE
Status: COMPLETED | OUTPATIENT
Start: 2020-08-28 | End: 2020-08-28

## 2020-08-28 RX ORDER — ACETAMINOPHEN 325 MG/1
650 TABLET ORAL ONCE
Status: COMPLETED | OUTPATIENT
Start: 2020-08-28 | End: 2020-08-28

## 2020-08-28 RX ORDER — ACETAMINOPHEN 325 MG/1
650 TABLET ORAL ONCE
Status: CANCELLED | OUTPATIENT
Start: 2020-08-31

## 2020-08-28 RX ADMIN — ACETAMINOPHEN 650 MG: 325 TABLET ORAL at 09:25

## 2020-08-28 RX ADMIN — CETIRIZINE HYDROCHLORIDE 10 MG: 10 TABLET, FILM COATED ORAL at 09:25

## 2020-08-28 RX ADMIN — NATALIZUMAB 300 MG: 300 INJECTION INTRAVENOUS at 09:26

## 2020-10-02 ENCOUNTER — OFFICE VISIT (OUTPATIENT)
Dept: NEUROLOGY | Facility: CLINIC | Age: 45
End: 2020-10-02

## 2020-10-02 VITALS
HEART RATE: 101 BPM | HEIGHT: 62 IN | TEMPERATURE: 97.7 F | DIASTOLIC BLOOD PRESSURE: 74 MMHG | WEIGHT: 205.4 LBS | BODY MASS INDEX: 37.8 KG/M2 | SYSTOLIC BLOOD PRESSURE: 128 MMHG | OXYGEN SATURATION: 98 %

## 2020-10-02 DIAGNOSIS — M21.372 LEFT FOOT DROP: ICD-10-CM

## 2020-10-02 DIAGNOSIS — R26.9 GAIT ABNORMALITY: ICD-10-CM

## 2020-10-02 DIAGNOSIS — G35 MS (MULTIPLE SCLEROSIS) (HCC): Primary | ICD-10-CM

## 2020-10-02 DIAGNOSIS — D50.8 IRON DEFICIENCY ANEMIA SECONDARY TO INADEQUATE DIETARY IRON INTAKE: ICD-10-CM

## 2020-10-02 DIAGNOSIS — M62.838 MUSCLE SPASTICITY: ICD-10-CM

## 2020-10-02 PROCEDURE — 99214 OFFICE O/P EST MOD 30 MIN: CPT | Performed by: NURSE PRACTITIONER

## 2020-10-02 NOTE — PROGRESS NOTES
Subjective:     Patient ID: Yifan Bowman is a 45 y.o. female.  Chief Complaint   Patient presents with   • Multiple Sclerosis     Follow up        History of Present Illness     45 y.o. woman returns in follow up for RRMS, spasticity and Iron def anemia. Last visit on 6/25/20 continued Aubagio until switching to LEIDY Tysabri.     Continued Aubagio 14 mg daily, ordered labs, MRI brain, continued Ampyra, LVT and Iron. Ordered PT/SLP.  Discussed switching to Tysabri LEIDY due to declining clinic metrics.     MRI Brain, my review of images, 5/12/20 as compared to, 4/25/19, no new/enlarging/enhancinglesions, moderate T2 PVWM, mild atrophy.     MRI C/T 8/14/20, my review of films, MS lesions at C3-C6 and T5-T7, no enhancement     Labs 5/1/20 CMP - NCS   HGB - 10.0  - instructed patient that she needs to take her Iron supplements more consistently.   JCV - 3.75     Swelling in bilateral lower extremities.     RRMS  Infused Tysabri 7/17, 8/28     SDMT improved from 41 to 49 since switching to Tysabri.     Has received her AFO, is working on finding shoes that will fit comfortably with the AFO.     Brand Ampyra improved walking. Does not use cane/rollator.     Feels her walking has improved following Tysabri infusions, no longer needing to hold to the walls, has not fallen.     Fatigue is mild.  Heat intolerance is severe, worsening with necessary usage of masks due to COVID.      Continued left 1 - 3 digits mild numbness. Improved following Tysabri.     Stopped PT after her last infusion.      Left leg spasticity    LVT stable spasticity  stiffness in left leg.   Denies side effects of Keppra    Anemia  States she is taking her Ferrous Sulfate regularly.     Problem history:    Previously dx and followed by Dr Franklin.  7 years ago fell off porch for unknown reasons.  Left hip is painful and weak.  Pain present when walking.  Left weakness has worsened.  Sx started two months after delivery of 4th child. Band like  pain and pressure around lower abdomen.  Noticeable fatigue.  Heat increases sleepiness.  Bladder frequency.  Numbness medial left calf and fingertips.  Started on Aubagio without side effects.      Reviewed Dr Franklin's notes:    Presented on 17 for left LE pain, numbness and weakness for 6 years.  Also, notes intermittent hand numbness.  MRI results below.  Started on Prednisone and Aubagio 7 mg on 17  Labs - ACE, Lyme, LUCY, CMP, CBC, TSH, CRP reviewed    My review of films:  MRI Brain 2/15/17 3/30/17  Large left anterior CC black hole, 10 to 15 T2 lesions, multiple CC lesions. No evidence of enhancement    MRI C-spine 3/30/17 C4/5 T2 cord signal w/o enhancement    The following portions of the patient's history were reviewed and updated as appropriate:   She  has a past medical history of MS (multiple sclerosis) (CMS/[facility]) (3/23/2017).  She  has a past surgical history that includes Reduction mammaplasty and  section.  Her family history includes Cancer in her paternal grandfather.  She  reports that she has never smoked. She has never used smokeless tobacco. She reports that she does not drink alcohol or use drugs.  Current Outpatient Medications   Medication Sig Dispense Refill   • AMPYRA 10 MG tablet sustained-release 12 hour TAKE 1 TABLET BY MOUTH EVERY 12 HOURS 180 tablet 3   • ferrous sulfate 325 (65 FE) MG tablet Take 325 mg by mouth Daily With Breakfast.     • levETIRAcetam XR (KEPPRA XR) 500 MG 24 hr tablet TAKE 2 TABLETS BY MOUTH ONCE DAILY. 180 tablet 2     No current facility-administered medications for this visit.      Current Outpatient Medications on File Prior to Visit   Medication Sig   • AMPYRA 10 MG tablet sustained-release 12 hour TAKE 1 TABLET BY MOUTH EVERY 12 HOURS   • ferrous sulfate 325 (65 FE) MG tablet Take 325 mg by mouth Daily With Breakfast.   • levETIRAcetam XR (KEPPRA XR) 500 MG 24 hr tablet TAKE 2 TABLETS BY MOUTH ONCE DAILY.   • [DISCONTINUED] ALPRAZolam (XANAX)  1 MG tablet Take one pill 30 minutes prior to MRI and may repeat once if necessary   • [DISCONTINUED] Teriflunomide (AUBAGIO) 14 MG tablet Take 14 mg by mouth Daily.     No current facility-administered medications on file prior to visit.      She is allergic to benadryl [diphenhydramine]..    Review of Systems   Constitutional: Negative for activity change and unexpected weight change.   HENT: Negative for tinnitus and trouble swallowing.    Eyes: Negative for photophobia and visual disturbance.   Respiratory: Negative for apnea and choking.    Cardiovascular: Negative for leg swelling.   Endocrine: Positive for heat intolerance. Negative for cold intolerance.   Genitourinary: Positive for frequency. Negative for difficulty urinating, menstrual problem and urgency.   Musculoskeletal: Positive for gait problem. Negative for back pain.   Skin: Negative for color change.   Allergic/Immunologic: Negative for immunocompromised state.   Neurological: Negative for dizziness, tremors, seizures, syncope, facial asymmetry, speech difficulty and light-headedness.   Hematological: Negative for adenopathy. Does not bruise/bleed easily.   Psychiatric/Behavioral: Negative for behavioral problems, confusion, decreased concentration, hallucinations and sleep disturbance.        Objective:  Vitals:    10/02/20 1011   BP: 128/74   Pulse: 101   Temp: 97.7 °F (36.5 °C)   SpO2: 98%   ,  Neurologic Exam     Mental Status   Registration: recalls 3 of 3 objects. Recall at 5 minutes: recalls 3 of 3 objects. Follows 3 step commands.   Attention: normal. Concentration: normal.   Level of consciousness: alert  Knowledge: good and consistent with education.   Able to name object. Able to read. Able to repeat. Able to write. Normal comprehension.     Cranial Nerves     CN II   Visual fields full to confrontation.   Visual acuity: normal  Right visual field deficit: none  Left visual field deficit: none     CN III, IV, VI   Pupils are equal,  round, and reactive to light.  Right pupil: Shape: regular. Reactivity: brisk. Consensual response: intact.   Left pupil: Shape: regular. Reactivity: brisk. Consensual response: intact.   Nystagmus: none   Diplopia: none  Ophthalmoparesis: none  Upgaze: normal  Downgaze: normal  Conjugate gaze: present  Vestibulo-ocular reflex: present    CN V   Facial sensation intact.   Right corneal reflex: normal  Left corneal reflex: normal    CN VII   Right facial weakness: none  Left facial weakness: none    CN VIII   Hearing: intact    CN IX, X   Palate: symmetric  Right gag reflex: normal  Left gag reflex: normal    CN XI   Right sternocleidomastoid strength: normal  Left sternocleidomastoid strength: normal    CN XII   Tongue: not atrophic  Fasciculations: absent  Tongue deviation: none    Motor Exam   Muscle bulk: normal  Overall muscle tone: normal  Right arm tone: normal  Left arm tone: normal  Right leg tone: normal  Left leg tone: increased    Strength   Strength 5/5 except as noted.   Left iliopsoas: 5/5  Left quadriceps: 5/5  Left hamstrin/5  Left glutei: 5/5  Left anterior tibial: 4/5  Left posterior tibial: 4/5  Left peroneal: 4/5  Left gastroc: 4/5    Sensory Exam   Light touch normal.   Vibration normal.   Proprioception normal.   Pinprick normal.         Gait, Coordination, and Reflexes     Gait  Gait: circumduction and spastic (left leg, left foot drop, using walls to steady while ambulate)    Coordination   Tandem walking coordination: abnormal    Tremor   Resting tremor: absent  Intention tremor: absent  Action tremor: absent    Reflexes   Right brachioradialis: 3+  Left brachioradialis: 3+  Right biceps: 3+  Left biceps: 3+  Right triceps: 3+  Left triceps: 3+  Right patellar: 3+  Left patellar: 4+  Right achilles: 3+  Left achilles: 4+  Right ankle clonus: present  Left ankle clonus: present      Physical Exam   Constitutional: She appears well-developed.   Eyes: Pupils are equal, round, and reactive  to light.   Abdominal: Normal appearance.   Musculoskeletal:      Right lower le+ Pitting Edema present.      Left lower le+ Pitting Edema present.   Neurological: She is alert. She has an abnormal Tandem Gait Test.   Reflex Scores:       Tricep reflexes are 3+ on the right side and 3+ on the left side.       Bicep reflexes are 3+ on the right side and 3+ on the left side.       Brachioradialis reflexes are 3+ on the right side and 3+ on the left side.       Patellar reflexes are 3+ on the right side and 4+ on the left side.       Achilles reflexes are 3+ on the right side and 4+ on the left side.  Skin: Skin is warm and dry. Capillary refill takes less than 2 seconds.   Psychiatric: Her behavior is normal. Mood normal.   Nursing note and vitals reviewed.      Assessment/Plan    Problem List Items Addressed This Visit        Nervous and Auditory    MS (multiple sclerosis) (CMS/[facility]) - Primary    Current Assessment & Plan     Improving on Tysabri LEIDY     Continue Tysabri     CBC, CMP, JCV     F/U in 6 months or sooner if needed          Relevant Orders    CBC & Differential    Comprehensive Metabolic Panel    Stratify JCV, Antibody INES With / Reflex To Inhibition Assay       Musculoskeletal and Integument    Muscle spasticity    Current Assessment & Plan     Stable on Keppra             Hematopoietic and Hemostatic    Iron deficiency anemia secondary to inadequate dietary iron intake    Current Assessment & Plan     Continue Iron supplement          Relevant Medications    ferrous sulfate 325 (65 FE) MG tablet       Other    Gait abnormality    Current Assessment & Plan     Continue Ampyra          Left foot drop    Current Assessment & Plan     Encouraged to wear AFO            Return in about 6 months (around 2021).

## 2020-10-02 NOTE — PATIENT INSTRUCTIONS
"DASH Eating Plan  DASH stands for \"Dietary Approaches to Stop Hypertension.\" The DASH eating plan is a healthy eating plan that has been shown to reduce high blood pressure (hypertension). It may also reduce your risk for type 2 diabetes, heart disease, and stroke. The DASH eating plan may also help with weight loss.  What are tips for following this plan?    General guidelines  · Avoid eating more than 2,300 mg (milligrams) of salt (sodium) a day. If you have hypertension, you may need to reduce your sodium intake to 1,500 mg a day.  · Limit alcohol intake to no more than 1 drink a day for nonpregnant women and 2 drinks a day for men. One drink equals 12 oz of beer, 5 oz of wine, or 1½ oz of hard liquor.  · Work with your health care provider to maintain a healthy body weight or to lose weight. Ask what an ideal weight is for you.  · Get at least 30 minutes of exercise that causes your heart to beat faster (aerobic exercise) most days of the week. Activities may include walking, swimming, or biking.  · Work with your health care provider or diet and nutrition specialist (dietitian) to adjust your eating plan to your individual calorie needs.  Reading food labels    · Check food labels for the amount of sodium per serving. Choose foods with less than 5 percent of the Daily Value of sodium. Generally, foods with less than 300 mg of sodium per serving fit into this eating plan.  · To find whole grains, look for the word \"whole\" as the first word in the ingredient list.  Shopping  · Buy products labeled as \"low-sodium\" or \"no salt added.\"  · Buy fresh foods. Avoid canned foods and premade or frozen meals.  Cooking  · Avoid adding salt when cooking. Use salt-free seasonings or herbs instead of table salt or sea salt. Check with your health care provider or pharmacist before using salt substitutes.  · Do not mckenzie foods. Cook foods using healthy methods such as baking, boiling, grilling, and broiling instead.  · Cook with " heart-healthy oils, such as olive, canola, soybean, or sunflower oil.  Meal planning  · Eat a balanced diet that includes:  ? 5 or more servings of fruits and vegetables each day. At each meal, try to fill half of your plate with fruits and vegetables.  ? Up to 6-8 servings of whole grains each day.  ? Less than 6 oz of lean meat, poultry, or fish each day. A 3-oz serving of meat is about the same size as a deck of cards. One egg equals 1 oz.  ? 2 servings of low-fat dairy each day.  ? A serving of nuts, seeds, or beans 5 times each week.  ? Heart-healthy fats. Healthy fats called Omega-3 fatty acids are found in foods such as flaxseeds and coldwater fish, like sardines, salmon, and mackerel.  · Limit how much you eat of the following:  ? Canned or prepackaged foods.  ? Food that is high in trans fat, such as fried foods.  ? Food that is high in saturated fat, such as fatty meat.  ? Sweets, desserts, sugary drinks, and other foods with added sugar.  ? Full-fat dairy products.  · Do not salt foods before eating.  · Try to eat at least 2 vegetarian meals each week.  · Eat more home-cooked food and less restaurant, buffet, and fast food.  · When eating at a restaurant, ask that your food be prepared with less salt or no salt, if possible.  What foods are recommended?  The items listed may not be a complete list. Talk with your dietitian about what dietary choices are best for you.  Grains  Whole-grain or whole-wheat bread. Whole-grain or whole-wheat pasta. Brown rice. Oatmeal. Quinoa. Bulgur. Whole-grain and low-sodium cereals. Johnna bread. Low-fat, low-sodium crackers. Whole-wheat flour tortillas.  Vegetables  Fresh or frozen vegetables (raw, steamed, roasted, or grilled). Low-sodium or reduced-sodium tomato and vegetable juice. Low-sodium or reduced-sodium tomato sauce and tomato paste. Low-sodium or reduced-sodium canned vegetables.  Fruits  All fresh, dried, or frozen fruit. Canned fruit in natural juice (without  added sugar).  Meat and other protein foods  Skinless chicken or turkey. Ground chicken or turkey. Pork with fat trimmed off. Fish and seafood. Egg whites. Dried beans, peas, or lentils. Unsalted nuts, nut butters, and seeds. Unsalted canned beans. Lean cuts of beef with fat trimmed off. Low-sodium, lean deli meat.  Dairy  Low-fat (1%) or fat-free (skim) milk. Fat-free, low-fat, or reduced-fat cheeses. Nonfat, low-sodium ricotta or cottage cheese. Low-fat or nonfat yogurt. Low-fat, low-sodium cheese.  Fats and oils  Soft margarine without trans fats. Vegetable oil. Low-fat, reduced-fat, or light mayonnaise and salad dressings (reduced-sodium). Canola, safflower, olive, soybean, and sunflower oils. Avocado.  Seasoning and other foods  Herbs. Spices. Seasoning mixes without salt. Unsalted popcorn and pretzels. Fat-free sweets.  What foods are not recommended?  The items listed may not be a complete list. Talk with your dietitian about what dietary choices are best for you.  Grains  Baked goods made with fat, such as croissants, muffins, or some breads. Dry pasta or rice meal packs.  Vegetables  Creamed or fried vegetables. Vegetables in a cheese sauce. Regular canned vegetables (not low-sodium or reduced-sodium). Regular canned tomato sauce and paste (not low-sodium or reduced-sodium). Regular tomato and vegetable juice (not low-sodium or reduced-sodium). Pickles. Olives.  Fruits  Canned fruit in a light or heavy syrup. Fried fruit. Fruit in cream or butter sauce.  Meat and other protein foods  Fatty cuts of meat. Ribs. Fried meat. Posey. Sausage. Bologna and other processed lunch meats. Salami. Fatback. Hotdogs. Bratwurst. Salted nuts and seeds. Canned beans with added salt. Canned or smoked fish. Whole eggs or egg yolks. Chicken or turkey with skin.  Dairy  Whole or 2% milk, cream, and half-and-half. Whole or full-fat cream cheese. Whole-fat or sweetened yogurt. Full-fat cheese. Nondairy creamers. Whipped toppings.  Processed cheese and cheese spreads.  Fats and oils  Butter. Stick margarine. Lard. Shortening. Ghee. Posey fat. Tropical oils, such as coconut, palm kernel, or palm oil.  Seasoning and other foods  Salted popcorn and pretzels. Onion salt, garlic salt, seasoned salt, table salt, and sea salt. Worcestershire sauce. Tartar sauce. Barbecue sauce. Teriyaki sauce. Soy sauce, including reduced-sodium. Steak sauce. Canned and packaged gravies. Fish sauce. Oyster sauce. Cocktail sauce. Horseradish that you find on the shelf. Ketchup. Mustard. Meat flavorings and tenderizers. Bouillon cubes. Hot sauce and Tabasco sauce. Premade or packaged marinades. Premade or packaged taco seasonings. Relishes. Regular salad dressings.  Where to find more information:  · National Heart, Lung, and Blood [place]: www.nhlbi.nih.gov  · American Heart Association: www.heart.org  Summary  · The DASH eating plan is a healthy eating plan that has been shown to reduce high blood pressure (hypertension). It may also reduce your risk for type 2 diabetes, heart disease, and stroke.  · With the DASH eating plan, you should limit salt (sodium) intake to 2,300 mg a day. If you have hypertension, you may need to reduce your sodium intake to 1,500 mg a day.  · When on the DASH eating plan, aim to eat more fresh fruits and vegetables, whole grains, lean proteins, low-fat dairy, and heart-healthy fats.  · Work with your health care provider or diet and nutrition specialist (dietitian) to adjust your eating plan to your individual calorie needs.  This information is not intended to replace advice given to you by your health care provider. Make sure you discuss any questions you have with your health care provider.  Document Released: 12/06/2012 Document Revised: 11/30/2018 Document Reviewed: 12/11/2017  Elsevier Patient Education © 2020 [employer]

## 2020-10-02 NOTE — ASSESSMENT & PLAN NOTE
Improving on Tysabri LEIDY     Continue Tysabri     CBC, CMP, JCV     F/U in 6 months or sooner if needed

## 2020-10-07 ENCOUNTER — RESULTS ENCOUNTER (OUTPATIENT)
Dept: NEUROLOGY | Facility: CLINIC | Age: 45
End: 2020-10-07

## 2020-10-07 DIAGNOSIS — G35 MS (MULTIPLE SCLEROSIS) (HCC): ICD-10-CM

## 2020-10-09 ENCOUNTER — INFUSION (OUTPATIENT)
Dept: ONCOLOGY | Facility: HOSPITAL | Age: 45
End: 2020-10-09

## 2020-10-09 VITALS
HEART RATE: 94 BPM | SYSTOLIC BLOOD PRESSURE: 129 MMHG | DIASTOLIC BLOOD PRESSURE: 82 MMHG | RESPIRATION RATE: 18 BRPM | TEMPERATURE: 97.1 F

## 2020-10-09 DIAGNOSIS — G35 MS (MULTIPLE SCLEROSIS) (HCC): Primary | ICD-10-CM

## 2020-10-09 LAB
ALBUMIN SERPL-MCNC: 3.8 G/DL (ref 3.5–5.2)
ALBUMIN/GLOB SERPL: 1.2 G/DL
ALP SERPL-CCNC: 105 U/L (ref 39–117)
ALT SERPL W P-5'-P-CCNC: 10 U/L (ref 1–33)
ANION GAP SERPL CALCULATED.3IONS-SCNC: 8 MMOL/L (ref 5–15)
AST SERPL-CCNC: 15 U/L (ref 1–32)
BASOPHILS # BLD AUTO: 0.04 10*3/MM3 (ref 0–0.2)
BASOPHILS NFR BLD AUTO: 0.5 % (ref 0–1.5)
BILIRUB SERPL-MCNC: 0.3 MG/DL (ref 0–1.2)
BUN SERPL-MCNC: 9 MG/DL (ref 6–20)
BUN/CREAT SERPL: 12.7 (ref 7–25)
CALCIUM SPEC-SCNC: 9 MG/DL (ref 8.6–10.5)
CHLORIDE SERPL-SCNC: 101 MMOL/L (ref 98–107)
CO2 SERPL-SCNC: 26 MMOL/L (ref 22–29)
CREAT SERPL-MCNC: 0.71 MG/DL (ref 0.57–1)
DEPRECATED RDW RBC AUTO: 49.8 FL (ref 37–54)
EOSINOPHIL # BLD AUTO: 0.28 10*3/MM3 (ref 0–0.4)
EOSINOPHIL NFR BLD AUTO: 3.5 % (ref 0.3–6.2)
ERYTHROCYTE [DISTWIDTH] IN BLOOD BY AUTOMATED COUNT: 14.5 % (ref 12.3–15.4)
GFR SERPL CREATININE-BSD FRML MDRD: 109 ML/MIN/1.73
GLOBULIN UR ELPH-MCNC: 3.1 GM/DL
GLUCOSE SERPL-MCNC: 85 MG/DL (ref 65–99)
HCT VFR BLD AUTO: 33.6 % (ref 34–46.6)
HGB BLD-MCNC: 10.8 G/DL (ref 12–15.9)
IMM GRANULOCYTES # BLD AUTO: 0.05 10*3/MM3 (ref 0–0.05)
IMM GRANULOCYTES NFR BLD AUTO: 0.6 % (ref 0–0.5)
LYMPHOCYTES # BLD AUTO: 3.52 10*3/MM3 (ref 0.7–3.1)
LYMPHOCYTES NFR BLD AUTO: 43.7 % (ref 19.6–45.3)
MCH RBC QN AUTO: 30.7 PG (ref 26.6–33)
MCHC RBC AUTO-ENTMCNC: 32.1 G/DL (ref 31.5–35.7)
MCV RBC AUTO: 95.5 FL (ref 79–97)
MONOCYTES # BLD AUTO: 0.58 10*3/MM3 (ref 0.1–0.9)
MONOCYTES NFR BLD AUTO: 7.2 % (ref 5–12)
NEUTROPHILS NFR BLD AUTO: 3.59 10*3/MM3 (ref 1.7–7)
NEUTROPHILS NFR BLD AUTO: 44.5 % (ref 42.7–76)
NRBC BLD AUTO-RTO: 0 /100 WBC (ref 0–0.2)
PLATELET # BLD AUTO: 348 10*3/MM3 (ref 140–450)
PMV BLD AUTO: 10.1 FL (ref 6–12)
POTASSIUM SERPL-SCNC: 3.8 MMOL/L (ref 3.5–5.2)
PROT SERPL-MCNC: 6.9 G/DL (ref 6–8.5)
RBC # BLD AUTO: 3.52 10*6/MM3 (ref 3.77–5.28)
SODIUM SERPL-SCNC: 135 MMOL/L (ref 136–145)
WBC # BLD AUTO: 8.06 10*3/MM3 (ref 3.4–10.8)

## 2020-10-09 PROCEDURE — 25010000002 NATALIZUMAB PER 1 MG: Performed by: PSYCHIATRY & NEUROLOGY

## 2020-10-09 PROCEDURE — 80053 COMPREHEN METABOLIC PANEL: CPT

## 2020-10-09 PROCEDURE — 85025 COMPLETE CBC W/AUTO DIFF WBC: CPT

## 2020-10-09 PROCEDURE — 96365 THER/PROPH/DIAG IV INF INIT: CPT

## 2020-10-09 RX ORDER — ACETAMINOPHEN 325 MG/1
650 TABLET ORAL ONCE
Status: COMPLETED | OUTPATIENT
Start: 2020-10-09 | End: 2020-10-09

## 2020-10-09 RX ORDER — ACETAMINOPHEN 325 MG/1
650 TABLET ORAL ONCE
Status: CANCELLED | OUTPATIENT
Start: 2020-10-12

## 2020-10-09 RX ORDER — CETIRIZINE HYDROCHLORIDE 10 MG/1
10 TABLET ORAL ONCE
Status: COMPLETED | OUTPATIENT
Start: 2020-10-09 | End: 2020-10-09

## 2020-10-09 RX ORDER — CETIRIZINE HYDROCHLORIDE 10 MG/1
10 TABLET ORAL ONCE
Status: CANCELLED
Start: 2020-10-12

## 2020-10-09 RX ORDER — SODIUM CHLORIDE 9 MG/ML
250 INJECTION, SOLUTION INTRAVENOUS ONCE
Status: CANCELLED | OUTPATIENT
Start: 2020-10-12

## 2020-10-09 RX ADMIN — ACETAMINOPHEN 650 MG: 325 TABLET ORAL at 08:38

## 2020-10-09 RX ADMIN — NATALIZUMAB 300 MG: 300 INJECTION INTRAVENOUS at 08:39

## 2020-10-09 RX ADMIN — CETIRIZINE HYDROCHLORIDE 10 MG: 10 TABLET, FILM COATED ORAL at 08:38

## 2020-10-15 LAB — REF LAB TEST METHOD: NORMAL

## 2020-11-20 ENCOUNTER — INFUSION (OUTPATIENT)
Dept: ONCOLOGY | Facility: HOSPITAL | Age: 45
End: 2020-11-20

## 2020-11-20 VITALS
SYSTOLIC BLOOD PRESSURE: 117 MMHG | DIASTOLIC BLOOD PRESSURE: 67 MMHG | RESPIRATION RATE: 16 BRPM | TEMPERATURE: 97.5 F | HEART RATE: 109 BPM

## 2020-11-20 DIAGNOSIS — G35 MS (MULTIPLE SCLEROSIS) (HCC): Primary | ICD-10-CM

## 2020-11-20 PROCEDURE — 25010000002 NATALIZUMAB PER 1 MG: Performed by: PSYCHIATRY & NEUROLOGY

## 2020-11-20 PROCEDURE — 96365 THER/PROPH/DIAG IV INF INIT: CPT

## 2020-11-20 RX ORDER — SODIUM CHLORIDE 9 MG/ML
250 INJECTION, SOLUTION INTRAVENOUS ONCE
Status: CANCELLED | OUTPATIENT
Start: 2020-11-23

## 2020-11-20 RX ORDER — CETIRIZINE HYDROCHLORIDE 10 MG/1
10 TABLET ORAL ONCE
Status: COMPLETED | OUTPATIENT
Start: 2020-11-20 | End: 2020-11-20

## 2020-11-20 RX ORDER — CETIRIZINE HYDROCHLORIDE 10 MG/1
10 TABLET ORAL ONCE
Status: CANCELLED
Start: 2020-11-23

## 2020-11-20 RX ORDER — ACETAMINOPHEN 325 MG/1
650 TABLET ORAL ONCE
Status: COMPLETED | OUTPATIENT
Start: 2020-11-20 | End: 2020-11-20

## 2020-11-20 RX ORDER — ACETAMINOPHEN 325 MG/1
650 TABLET ORAL ONCE
Status: CANCELLED | OUTPATIENT
Start: 2020-11-23

## 2020-11-20 RX ADMIN — ACETAMINOPHEN 650 MG: 325 TABLET ORAL at 08:38

## 2020-11-20 RX ADMIN — CETIRIZINE HYDROCHLORIDE 10 MG: 10 TABLET, FILM COATED ORAL at 08:38

## 2020-11-20 RX ADMIN — NATALIZUMAB 300 MG: 300 INJECTION INTRAVENOUS at 08:40

## 2020-12-29 ENCOUNTER — TELEPHONE (OUTPATIENT)
Dept: NEUROLOGY | Facility: CLINIC | Age: 45
End: 2020-12-29

## 2020-12-29 NOTE — TELEPHONE ENCOUNTER
Provider:DR ELLIS   Caller: HIRAM DAMICO  Relationship to Patient: SELF    Phone Number: [phone]  Reason for Call: PT CALLING IN REGARDING QUESTIONS ON COVID VACCINE  AND HER DX SHE STATES SHE HAD TESTED POSITIVE PREVIOUSLY AND SHE IS WONDERING ABOUT THE DIFFERENCE IN THE VACCINES  SHE STATES HER EMPLOYER IS OFFERING THE VACCINE BUT SHE NEEDS CLARIFICATION BEFORE GOING THROUGH WITH THIS . PLEASE ADVISE

## 2020-12-29 NOTE — TELEPHONE ENCOUNTER
Spoke with patient after discussing with Miranda. Recommendation is to get the vaccine for our MS patients. If patient has tested positive for Covid recommendation is to wait 3 months if possible before getting vaccine. Patient asked me which vaccine was more effective, informed patient they are equal and that either one would be fine to take, Pfizer is 95% and Moderna is 94.1% per the CDC. Patient also asked if she should wait the 3 months even though she may not get offered the vaccine again for awhile, informed patient that was up to her on that decision. Patient verbalized understanding.

## 2020-12-31 ENCOUNTER — INFUSION (OUTPATIENT)
Dept: ONCOLOGY | Facility: HOSPITAL | Age: 45
End: 2020-12-31

## 2020-12-31 VITALS
DIASTOLIC BLOOD PRESSURE: 88 MMHG | SYSTOLIC BLOOD PRESSURE: 146 MMHG | HEART RATE: 104 BPM | RESPIRATION RATE: 16 BRPM | TEMPERATURE: 96.8 F

## 2020-12-31 DIAGNOSIS — G35 MS (MULTIPLE SCLEROSIS) (HCC): Primary | ICD-10-CM

## 2020-12-31 PROCEDURE — 25010000002 NATALIZUMAB PER 1 MG: Performed by: PSYCHIATRY & NEUROLOGY

## 2020-12-31 PROCEDURE — 96365 THER/PROPH/DIAG IV INF INIT: CPT

## 2020-12-31 RX ORDER — ACETAMINOPHEN 325 MG/1
650 TABLET ORAL ONCE
Status: COMPLETED | OUTPATIENT
Start: 2020-12-31 | End: 2020-12-31

## 2020-12-31 RX ORDER — ACETAMINOPHEN 325 MG/1
650 TABLET ORAL ONCE
Status: CANCELLED | OUTPATIENT
Start: 2021-01-01

## 2020-12-31 RX ORDER — CETIRIZINE HYDROCHLORIDE 10 MG/1
10 TABLET ORAL ONCE
Status: CANCELLED
Start: 2021-01-01

## 2020-12-31 RX ORDER — SODIUM CHLORIDE 9 MG/ML
250 INJECTION, SOLUTION INTRAVENOUS ONCE
Status: CANCELLED | OUTPATIENT
Start: 2021-01-01

## 2020-12-31 RX ORDER — CETIRIZINE HYDROCHLORIDE 10 MG/1
10 TABLET ORAL ONCE
Status: COMPLETED | OUTPATIENT
Start: 2020-12-31 | End: 2020-12-31

## 2020-12-31 RX ADMIN — CETIRIZINE HYDROCHLORIDE 10 MG: 10 TABLET, FILM COATED ORAL at 09:45

## 2020-12-31 RX ADMIN — ACETAMINOPHEN 650 MG: 325 TABLET ORAL at 09:45

## 2020-12-31 RX ADMIN — NATALIZUMAB 300 MG: 300 INJECTION INTRAVENOUS at 09:46

## 2021-02-11 ENCOUNTER — APPOINTMENT (OUTPATIENT)
Dept: ONCOLOGY | Facility: HOSPITAL | Age: 46
End: 2021-02-11

## 2021-02-12 ENCOUNTER — INFUSION (OUTPATIENT)
Dept: ONCOLOGY | Facility: HOSPITAL | Age: 46
End: 2021-02-12

## 2021-02-12 VITALS
TEMPERATURE: 97.5 F | SYSTOLIC BLOOD PRESSURE: 130 MMHG | RESPIRATION RATE: 16 BRPM | DIASTOLIC BLOOD PRESSURE: 63 MMHG | HEART RATE: 112 BPM

## 2021-02-12 DIAGNOSIS — G35 MS (MULTIPLE SCLEROSIS) (HCC): Primary | ICD-10-CM

## 2021-02-12 PROCEDURE — 25010000002 NATALIZUMAB PER 1 MG: Performed by: PSYCHIATRY & NEUROLOGY

## 2021-02-12 PROCEDURE — 96365 THER/PROPH/DIAG IV INF INIT: CPT

## 2021-02-12 RX ORDER — CETIRIZINE HYDROCHLORIDE 10 MG/1
10 TABLET ORAL ONCE
Status: COMPLETED | OUTPATIENT
Start: 2021-02-12 | End: 2021-02-12

## 2021-02-12 RX ORDER — ACETAMINOPHEN 325 MG/1
650 TABLET ORAL ONCE
Status: CANCELLED | OUTPATIENT
Start: 2021-02-15

## 2021-02-12 RX ORDER — CETIRIZINE HYDROCHLORIDE 10 MG/1
10 TABLET ORAL ONCE
Status: CANCELLED
Start: 2021-02-15

## 2021-02-12 RX ORDER — ACETAMINOPHEN 325 MG/1
650 TABLET ORAL ONCE
Status: COMPLETED | OUTPATIENT
Start: 2021-02-12 | End: 2021-02-12

## 2021-02-12 RX ORDER — SODIUM CHLORIDE 9 MG/ML
250 INJECTION, SOLUTION INTRAVENOUS ONCE
Status: CANCELLED | OUTPATIENT
Start: 2021-02-15

## 2021-02-12 RX ADMIN — NATALIZUMAB 300 MG: 300 INJECTION INTRAVENOUS at 10:14

## 2021-02-12 RX ADMIN — ACETAMINOPHEN 650 MG: 325 TABLET ORAL at 10:12

## 2021-02-12 RX ADMIN — CETIRIZINE HYDROCHLORIDE 10 MG: 10 TABLET, FILM COATED ORAL at 10:12

## 2021-02-16 RX ORDER — DALFAMPRIDINE 10 MG/1
TABLET, FILM COATED, EXTENDED RELEASE ORAL
Qty: 180 TABLET | Refills: 3 | Status: SHIPPED | OUTPATIENT
Start: 2021-02-16 | End: 2022-02-21 | Stop reason: SDUPTHER

## 2021-03-26 ENCOUNTER — INFUSION (OUTPATIENT)
Dept: ONCOLOGY | Facility: HOSPITAL | Age: 46
End: 2021-03-26

## 2021-03-26 VITALS
SYSTOLIC BLOOD PRESSURE: 119 MMHG | HEART RATE: 97 BPM | DIASTOLIC BLOOD PRESSURE: 71 MMHG | RESPIRATION RATE: 18 BRPM | TEMPERATURE: 97.7 F

## 2021-03-26 DIAGNOSIS — G35 MULTIPLE SCLEROSIS (HCC): Primary | ICD-10-CM

## 2021-03-26 DIAGNOSIS — G35 MS (MULTIPLE SCLEROSIS) (HCC): ICD-10-CM

## 2021-03-26 LAB
ALBUMIN SERPL-MCNC: 4.1 G/DL (ref 3.5–5.2)
ALBUMIN/GLOB SERPL: 1.2 G/DL
ALP SERPL-CCNC: 119 U/L (ref 39–117)
ALT SERPL W P-5'-P-CCNC: 9 U/L (ref 1–33)
ANION GAP SERPL CALCULATED.3IONS-SCNC: 8 MMOL/L (ref 5–15)
AST SERPL-CCNC: 16 U/L (ref 1–32)
BASOPHILS # BLD AUTO: 0.05 10*3/MM3 (ref 0–0.2)
BASOPHILS NFR BLD AUTO: 0.5 % (ref 0–1.5)
BILIRUB SERPL-MCNC: 0.2 MG/DL (ref 0–1.2)
BUN SERPL-MCNC: 10 MG/DL (ref 6–20)
BUN/CREAT SERPL: 12.8 (ref 7–25)
CALCIUM SPEC-SCNC: 9.4 MG/DL (ref 8.6–10.5)
CHLORIDE SERPL-SCNC: 104 MMOL/L (ref 98–107)
CO2 SERPL-SCNC: 27 MMOL/L (ref 22–29)
CREAT SERPL-MCNC: 0.78 MG/DL (ref 0.57–1)
DEPRECATED RDW RBC AUTO: 51.6 FL (ref 37–54)
EOSINOPHIL # BLD AUTO: 0.25 10*3/MM3 (ref 0–0.4)
EOSINOPHIL NFR BLD AUTO: 2.5 % (ref 0.3–6.2)
ERYTHROCYTE [DISTWIDTH] IN BLOOD BY AUTOMATED COUNT: 16.6 % (ref 12.3–15.4)
GFR SERPL CREATININE-BSD FRML MDRD: 98 ML/MIN/1.73
GLOBULIN UR ELPH-MCNC: 3.4 GM/DL
GLUCOSE SERPL-MCNC: 87 MG/DL (ref 65–99)
HCT VFR BLD AUTO: 33.1 % (ref 34–46.6)
HGB BLD-MCNC: 10.3 G/DL (ref 12–15.9)
IMM GRANULOCYTES # BLD AUTO: 0.16 10*3/MM3 (ref 0–0.05)
IMM GRANULOCYTES NFR BLD AUTO: 1.6 % (ref 0–0.5)
LYMPHOCYTES # BLD AUTO: 4.95 10*3/MM3 (ref 0.7–3.1)
LYMPHOCYTES NFR BLD AUTO: 50.3 % (ref 19.6–45.3)
MCH RBC QN AUTO: 26.2 PG (ref 26.6–33)
MCHC RBC AUTO-ENTMCNC: 31.1 G/DL (ref 31.5–35.7)
MCV RBC AUTO: 84.2 FL (ref 79–97)
MONOCYTES # BLD AUTO: 0.71 10*3/MM3 (ref 0.1–0.9)
MONOCYTES NFR BLD AUTO: 7.2 % (ref 5–12)
NEUTROPHILS NFR BLD AUTO: 3.72 10*3/MM3 (ref 1.7–7)
NEUTROPHILS NFR BLD AUTO: 37.9 % (ref 42.7–76)
NRBC BLD AUTO-RTO: 0.8 /100 WBC (ref 0–0.2)
PLATELET # BLD AUTO: 370 10*3/MM3 (ref 140–450)
PMV BLD AUTO: 10.4 FL (ref 6–12)
POTASSIUM SERPL-SCNC: 3.9 MMOL/L (ref 3.5–5.2)
PROT SERPL-MCNC: 7.5 G/DL (ref 6–8.5)
RBC # BLD AUTO: 3.93 10*6/MM3 (ref 3.77–5.28)
SODIUM SERPL-SCNC: 139 MMOL/L (ref 136–145)
WBC # BLD AUTO: 9.84 10*3/MM3 (ref 3.4–10.8)

## 2021-03-26 PROCEDURE — 96365 THER/PROPH/DIAG IV INF INIT: CPT

## 2021-03-26 PROCEDURE — 80053 COMPREHEN METABOLIC PANEL: CPT

## 2021-03-26 PROCEDURE — 25010000002 NATALIZUMAB PER 1 MG: Performed by: PSYCHIATRY & NEUROLOGY

## 2021-03-26 PROCEDURE — 85025 COMPLETE CBC W/AUTO DIFF WBC: CPT

## 2021-03-26 RX ORDER — CETIRIZINE HYDROCHLORIDE 10 MG/1
10 TABLET ORAL ONCE
Status: COMPLETED | OUTPATIENT
Start: 2021-03-26 | End: 2021-03-26

## 2021-03-26 RX ORDER — ACETAMINOPHEN 325 MG/1
650 TABLET ORAL ONCE
Status: COMPLETED | OUTPATIENT
Start: 2021-03-26 | End: 2021-03-26

## 2021-03-26 RX ORDER — SODIUM CHLORIDE 9 MG/ML
250 INJECTION, SOLUTION INTRAVENOUS ONCE
Status: CANCELLED | OUTPATIENT
Start: 2021-03-29

## 2021-03-26 RX ORDER — CETIRIZINE HYDROCHLORIDE 10 MG/1
10 TABLET ORAL ONCE
Status: CANCELLED
Start: 2021-03-29 | End: 2021-03-29

## 2021-03-26 RX ORDER — ACETAMINOPHEN 325 MG/1
650 TABLET ORAL ONCE
Status: CANCELLED | OUTPATIENT
Start: 2021-03-29

## 2021-03-26 RX ADMIN — CETIRIZINE HYDROCHLORIDE 10 MG: 10 TABLET, FILM COATED ORAL at 09:24

## 2021-03-26 RX ADMIN — ACETAMINOPHEN 650 MG: 325 TABLET ORAL at 09:24

## 2021-03-26 RX ADMIN — NATALIZUMAB 300 MG: 300 INJECTION INTRAVENOUS at 09:26

## 2021-03-30 ENCOUNTER — TELEPHONE (OUTPATIENT)
Dept: NEUROLOGY | Facility: CLINIC | Age: 46
End: 2021-03-30

## 2021-03-30 NOTE — TELEPHONE ENCOUNTER
Spoke with patient. She is not taking her Iron pills currently but will start taking them again. She verbalized understanding.

## 2021-03-30 NOTE — TELEPHONE ENCOUNTER
DELETE AFTER REVIEWING: Telephone encounter to be sent to the clinical pool.    Caller: HIRAM    Relationship: SELF    Best call back number: [phone]    Caller requesting test results: PT    What test was performed: Comprehensive Metabolic Panel   CBC & Differential  Stratify JCV(TM) Ab w/ Index     When was the test performed: 3-26-21  Where was the test performed: [facility]     ”

## 2021-04-05 ENCOUNTER — OFFICE VISIT (OUTPATIENT)
Dept: NEUROLOGY | Facility: CLINIC | Age: 46
End: 2021-04-05

## 2021-04-05 VITALS
SYSTOLIC BLOOD PRESSURE: 116 MMHG | BODY MASS INDEX: 36.99 KG/M2 | OXYGEN SATURATION: 100 % | HEIGHT: 62 IN | WEIGHT: 201 LBS | DIASTOLIC BLOOD PRESSURE: 72 MMHG | HEART RATE: 96 BPM

## 2021-04-05 DIAGNOSIS — G35 MS (MULTIPLE SCLEROSIS) (HCC): Chronic | ICD-10-CM

## 2021-04-05 DIAGNOSIS — D50.8 IRON DEFICIENCY ANEMIA SECONDARY TO INADEQUATE DIETARY IRON INTAKE: ICD-10-CM

## 2021-04-05 DIAGNOSIS — M62.838 MUSCLE SPASTICITY: ICD-10-CM

## 2021-04-05 DIAGNOSIS — R26.9 GAIT ABNORMALITY: Chronic | ICD-10-CM

## 2021-04-05 PROCEDURE — 99214 OFFICE O/P EST MOD 30 MIN: CPT | Performed by: NURSE PRACTITIONER

## 2021-04-05 NOTE — ASSESSMENT & PLAN NOTE
Continues on LEIDY Tysakti, discussed getting COVID vaccinated and switching to Ocrevus. Given information and pamphlet on Ocrevus.     Advised using a cane and AFO for ambulation     Labs UTD     Will order MRI's at next appointment     F/U in 3 months

## 2021-04-05 NOTE — PROGRESS NOTES
Subjective:     Patient ID: Yifan Bowman is a 45 y.o. female.  Chief Complaint   Patient presents with   • Multiple Sclerosis       History of Present Illness     45 y.o. woman returns in follow up for RRMS, spasticity and Iron def anemia. Last visit on 10/2/20 continued LEIDY Tysabri and ordered labs, continued Keppra, Iron, and Ampyra.     MRI Brain, my review of images, 5/12/20 as compared to, 4/25/19, no new/enlarging/enhancinglesions, moderate T2 PVWM, mild atrophy.     MRI C/T 8/14/20, my review of films, MS lesions at C3-C6 and T5-T7, no enhancement     Labs 3/26/21 CMP - NCS, HGB 10.3 - had not been taking her ferrous sulfate, instructed to restart.   JCV - 3.75     RRMS  Infused Tysabri Q6 weeks since 7/17/20, no new or worsening symptoms     Not wearing her AFO, only using a cane when home     Brand Ampyra improved walking. Does not use cane/rollator unless she is at home, uses a power chair at the grocery. Denies falls, continues to use walls to steady her gait.     Fatigue is mild.  Heat intolerance is severe    Continued left 1 - 3 digits mild numbness. Improved mildly on Tysabri.     Discussed getting COVID vaccine, she is on the fence. 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      Left leg spasticity    LVT stable spasticity  stiffness in left leg.   Denies side effects of Keppra    Anemia  Continuously not taking her iron regularly. Has never seen Hematology.     Problem history:    Previously dx and followed by Dr Franklin.  7 years ago fell off porch for unknown reasons.  Left hip is painful and weak.  Pain present when walking.  Left weakness has worsened.  Sx started two months after delivery of 4th child. Band like pain and pressure around lower abdomen.  Noticeable fatigue.  Heat increases sleepiness.  Bladder frequency.  Numbness medial left calf and fingertips.  Started on Aubagio without side effects.       Reviewed Dr Franklin's notes:    Presented on 17 for left LE pain, numbness and weakness for 6 years.  Also, notes intermittent hand numbness.  MRI results below.  Started on Prednisone and Aubagio 7 mg on 17  Labs - ACE, Lyme, LUCY, CMP, CBC, TSH, CRP reviewed    My review of films:  MRI Brain 2/15/17 3/30/17  Large left anterior CC black hole, 10 to 15 T2 lesions, multiple CC lesions. No evidence of enhancement    MRI C-spine 3/30/17 C4/5 T2 cord signal w/o enhancement    The following portions of the patient's history were reviewed and updated as appropriate:   She  has a past medical history of MS (multiple sclerosis) (CMS/[facility]) (3/23/2017).  She  has a past surgical history that includes Reduction mammaplasty and  section.  Her family history includes Cancer in her paternal grandfather.  She  reports that she has never smoked. She has never used smokeless tobacco. She reports that she does not drink alcohol and does not use drugs.  Current Outpatient Medications   Medication Sig Dispense Refill   • Ampyra 10 MG tablet sustained-release 12 hour TAKE 1 TABLET BY MOUTH EVERY 12 HOURS 180 tablet 3   • levETIRAcetam XR (KEPPRA XR) 500 MG 24 hr tablet TAKE 2 TABLETS BY MOUTH ONCE DAILY. 180 tablet 2     No current facility-administered medications for this visit.     Current Outpatient Medications on File Prior to Visit   Medication Sig   • Ampyra 10 MG tablet sustained-release 12 hour TAKE 1 TABLET BY MOUTH EVERY 12 HOURS   • levETIRAcetam XR (KEPPRA XR) 500 MG 24 hr tablet TAKE 2 TABLETS BY MOUTH ONCE DAILY.     No current facility-administered medications on file prior to visit.     She is allergic to benadryl [diphenhydramine]..    Review of Systems        Objective:  Vitals:    21 0848   BP: 116/72   Pulse: 96   SpO2: 100%   ,  Neurologic Exam     Mental Status   Attention: normal. Concentration: normal.   Speech: speech is normal   Level of consciousness: alert  Knowledge: good and  consistent with education.   Able to name object. Able to read. Able to repeat. Able to write. Normal comprehension.     Cranial Nerves     CN II   Visual fields full to confrontation.   Visual acuity: normal  Right visual field deficit: none  Left visual field deficit: none     CN III, IV, VI   Pupils are equal, round, and reactive to light.  Right pupil: Shape: regular. Reactivity: brisk. Consensual response: intact.   Left pupil: Shape: regular. Reactivity: brisk. Consensual response: intact.   Nystagmus: none   Diplopia: none  Ophthalmoparesis: none  Upgaze: normal  Downgaze: normal  Conjugate gaze: present  Vestibulo-ocular reflex: present    CN V   Facial sensation intact.   Right corneal reflex: normal  Left corneal reflex: normal    CN VII   Right facial weakness: none  Left facial weakness: none    CN VIII   Hearing: intact    CN IX, X   Palate: symmetric  Right gag reflex: normal  Left gag reflex: normal    CN XI   Right sternocleidomastoid strength: normal  Left sternocleidomastoid strength: normal    CN XII   Tongue: not atrophic  Fasciculations: absent  Tongue deviation: none    Motor Exam   Muscle bulk: normal  Overall muscle tone: normal  Right arm tone: normal  Left arm tone: normal  Right leg tone: normal  Left leg tone: increased    Strength   Strength 5/5 except as noted.   Left iliopsoas: 5/5  Left quadriceps: 5/5  Left hamstrin/5  Left glutei: 5/5  Left anterior tibial: 4/5  Left posterior tibial: 4/5  Left peroneal: 4/5  Left gastroc: 4/5    Sensory Exam   Light touch normal.   Proprioception normal.         Gait, Coordination, and Reflexes     Gait  Gait: circumduction and spastic (left leg, left foot drop, using walls to steady while ambulate)    Coordination   Tandem walking coordination: abnormal    Tremor   Resting tremor: absent  Intention tremor: absent  Action tremor: absent    Reflexes   Right brachioradialis: 3+  Left brachioradialis: 3+  Right biceps: 3+  Left biceps: 3+  Right  triceps: 3+  Left triceps: 3+  Right patellar: 3+  Left patellar: 4+  Right achilles: 3+  Left achilles: 4+  Right ankle clonus: present  Left ankle clonus: present      Physical Exam   Constitutional: She appears well-developed.   Eyes: Pupils are equal, round, and reactive to light.   Abdominal: Normal appearance.   Neurological: She is alert. She has an abnormal Tandem Gait Test.   Reflex Scores:       Tricep reflexes are 3+ on the right side and 3+ on the left side.       Bicep reflexes are 3+ on the right side and 3+ on the left side.       Brachioradialis reflexes are 3+ on the right side and 3+ on the left side.       Patellar reflexes are 3+ on the right side and 4+ on the left side.       Achilles reflexes are 3+ on the right side and 4+ on the left side.  Skin: Skin is warm and dry. Capillary refill takes less than 2 seconds.   Psychiatric: Her speech is normal and behavior is normal. Mood normal.   Nursing note and vitals reviewed.      Assessment/Plan    Problem List Items Addressed This Visit        Hematology and Neoplasia    Iron deficiency anemia secondary to inadequate dietary iron intake    Current Assessment & Plan     Intermittently taking her Iron     Referred to Hematology for further treatment         Relevant Orders    Ambulatory Referral to Hematology       Musculoskeletal and Injuries    Muscle spasticity    Current Assessment & Plan     Continues on Keppra             Neuro    MS (multiple sclerosis) (CMS/[facility])    Current Assessment & Plan     Continues on LEIDY [place], discussed getting COVID vaccinated and switching to Ocrevus. Given information and pamphlet on Ocrevus.     Advised using a cane and AFO for ambulation     Labs UTD     Will order MRI's at next appointment     F/U in 3 months             Symptoms and Signs    Gait abnormality    Current Assessment & Plan     Continue Ampyra            Return in about 3 months (around 7/5/2021).

## 2021-04-07 LAB
CONV INDEX VALUE: 3.25
INTERPRETATION: ABNORMAL
INTERPRETATION: ABNORMAL
JCPYV AB SERPL QL IA: POSITIVE

## 2021-04-08 DIAGNOSIS — D50.8 IRON DEFICIENCY ANEMIA SECONDARY TO INADEQUATE DIETARY IRON INTAKE: Primary | ICD-10-CM

## 2021-04-11 ENCOUNTER — LAB (OUTPATIENT)
Dept: LAB | Facility: HOSPITAL | Age: 46
End: 2021-04-11

## 2021-04-11 DIAGNOSIS — D50.8 IRON DEFICIENCY ANEMIA SECONDARY TO INADEQUATE DIETARY IRON INTAKE: ICD-10-CM

## 2021-04-11 LAB
IRON 24H UR-MRATE: 38 MCG/DL (ref 37–145)
IRON SATN MFR SERPL: 9 % (ref 20–50)
TIBC SERPL-MCNC: 440 MCG/DL (ref 298–536)
TRANSFERRIN SERPL-MCNC: 295 MG/DL (ref 200–360)

## 2021-04-11 PROCEDURE — 83540 ASSAY OF IRON: CPT

## 2021-04-11 PROCEDURE — 84466 ASSAY OF TRANSFERRIN: CPT

## 2021-04-11 PROCEDURE — 36415 COLL VENOUS BLD VENIPUNCTURE: CPT

## 2021-04-12 RX ORDER — LEVETIRACETAM 500 MG/1
TABLET, EXTENDED RELEASE ORAL
Qty: 180 TABLET | Refills: 2 | Status: SHIPPED | OUTPATIENT
Start: 2021-04-12 | End: 2021-05-07

## 2021-05-07 ENCOUNTER — INFUSION (OUTPATIENT)
Dept: ONCOLOGY | Facility: HOSPITAL | Age: 46
End: 2021-05-07

## 2021-05-07 VITALS
SYSTOLIC BLOOD PRESSURE: 123 MMHG | DIASTOLIC BLOOD PRESSURE: 81 MMHG | TEMPERATURE: 98 F | RESPIRATION RATE: 16 BRPM | HEART RATE: 91 BPM

## 2021-05-07 DIAGNOSIS — G35 MS (MULTIPLE SCLEROSIS) (HCC): Primary | ICD-10-CM

## 2021-05-07 PROCEDURE — 25010000002 NATALIZUMAB PER 1 MG: Performed by: PSYCHIATRY & NEUROLOGY

## 2021-05-07 PROCEDURE — 96365 THER/PROPH/DIAG IV INF INIT: CPT

## 2021-05-07 RX ORDER — CETIRIZINE HYDROCHLORIDE 10 MG/1
10 TABLET ORAL ONCE
Status: COMPLETED | OUTPATIENT
Start: 2021-05-07 | End: 2021-05-07

## 2021-05-07 RX ORDER — PNV NO.95/FERROUS FUM/FOLIC AC 28MG-0.8MG
325 TABLET ORAL
COMMUNITY

## 2021-05-07 RX ORDER — SODIUM CHLORIDE 9 MG/ML
250 INJECTION, SOLUTION INTRAVENOUS ONCE
Status: CANCELLED | OUTPATIENT
Start: 2021-05-10

## 2021-05-07 RX ORDER — ACETAMINOPHEN 325 MG/1
650 TABLET ORAL ONCE
Status: COMPLETED | OUTPATIENT
Start: 2021-05-07 | End: 2021-05-07

## 2021-05-07 RX ORDER — CETIRIZINE HYDROCHLORIDE 10 MG/1
10 TABLET ORAL ONCE
Status: CANCELLED
Start: 2021-05-10 | End: 2021-05-10

## 2021-05-07 RX ORDER — ACETAMINOPHEN 325 MG/1
650 TABLET ORAL ONCE
Status: CANCELLED | OUTPATIENT
Start: 2021-05-10

## 2021-05-07 RX ADMIN — CETIRIZINE HYDROCHLORIDE 10 MG: 10 TABLET, FILM COATED ORAL at 09:57

## 2021-05-07 RX ADMIN — ACETAMINOPHEN 650 MG: 325 TABLET ORAL at 09:57

## 2021-05-07 RX ADMIN — NATALIZUMAB 300 MG: 300 INJECTION INTRAVENOUS at 10:00

## 2021-05-19 ENCOUNTER — CONSULT (OUTPATIENT)
Dept: ONCOLOGY | Facility: CLINIC | Age: 46
End: 2021-05-19

## 2021-05-19 ENCOUNTER — LAB (OUTPATIENT)
Dept: LAB | Facility: HOSPITAL | Age: 46
End: 2021-05-19

## 2021-05-19 VITALS
HEART RATE: 85 BPM | RESPIRATION RATE: 16 BRPM | BODY MASS INDEX: 36.99 KG/M2 | WEIGHT: 201 LBS | SYSTOLIC BLOOD PRESSURE: 134 MMHG | OXYGEN SATURATION: 98 % | HEIGHT: 62 IN | TEMPERATURE: 96.2 F | DIASTOLIC BLOOD PRESSURE: 64 MMHG

## 2021-05-19 DIAGNOSIS — D50.8 IRON DEFICIENCY ANEMIA SECONDARY TO INADEQUATE DIETARY IRON INTAKE: Primary | ICD-10-CM

## 2021-05-19 DIAGNOSIS — K90.9 MALABSORPTION OF IRON: ICD-10-CM

## 2021-05-19 DIAGNOSIS — D50.8 IRON DEFICIENCY ANEMIA SECONDARY TO INADEQUATE DIETARY IRON INTAKE: ICD-10-CM

## 2021-05-19 LAB
25(OH)D3 SERPL-MCNC: 5.8 NG/ML (ref 30–100)
ERYTHROCYTE [DISTWIDTH] IN BLOOD BY AUTOMATED COUNT: 20.6 % (ref 12.3–15.4)
FERRITIN SERPL-MCNC: 34.24 NG/ML (ref 13–150)
FOLATE SERPL-MCNC: 3.07 NG/ML (ref 4.78–24.2)
HCT VFR BLD AUTO: 32.6 % (ref 34–46.6)
HGB BLD-MCNC: 10.8 G/DL (ref 12–15.9)
IRON 24H UR-MRATE: 113 MCG/DL (ref 37–145)
IRON SATN MFR SERPL: 26 % (ref 20–50)
LYMPHOCYTES # BLD AUTO: 4.7 10*3/MM3 (ref 0.7–3.1)
LYMPHOCYTES NFR BLD AUTO: 46.4 % (ref 19.6–45.3)
MCH RBC QN AUTO: 28.5 PG (ref 26.6–33)
MCHC RBC AUTO-ENTMCNC: 33.2 G/DL (ref 31.5–35.7)
MCV RBC AUTO: 85.6 FL (ref 79–97)
MONOCYTES # BLD AUTO: 0.7 10*3/MM3 (ref 0.1–0.9)
MONOCYTES NFR BLD AUTO: 7 % (ref 5–12)
NEUTROPHILS NFR BLD AUTO: 4.8 10*3/MM3 (ref 1.7–7)
NEUTROPHILS NFR BLD AUTO: 46.6 % (ref 42.7–76)
PLATELET # BLD AUTO: 407 10*3/MM3 (ref 140–450)
PMV BLD AUTO: 6.6 FL (ref 6–12)
RBC # BLD AUTO: 3.81 10*6/MM3 (ref 3.77–5.28)
TIBC SERPL-MCNC: 437 MCG/DL (ref 298–536)
TRANSFERRIN SERPL-MCNC: 293 MG/DL (ref 200–360)
VIT B12 BLD-MCNC: 535 PG/ML (ref 211–946)
WBC # BLD AUTO: 10.2 10*3/MM3 (ref 3.4–10.8)

## 2021-05-19 PROCEDURE — 83540 ASSAY OF IRON: CPT

## 2021-05-19 PROCEDURE — 82728 ASSAY OF FERRITIN: CPT

## 2021-05-19 PROCEDURE — 85025 COMPLETE CBC W/AUTO DIFF WBC: CPT

## 2021-05-19 PROCEDURE — 99204 OFFICE O/P NEW MOD 45 MIN: CPT | Performed by: INTERNAL MEDICINE

## 2021-05-19 PROCEDURE — 36415 COLL VENOUS BLD VENIPUNCTURE: CPT

## 2021-05-19 PROCEDURE — 84466 ASSAY OF TRANSFERRIN: CPT

## 2021-05-19 PROCEDURE — 82746 ASSAY OF FOLIC ACID SERUM: CPT

## 2021-05-19 PROCEDURE — 82607 VITAMIN B-12: CPT

## 2021-05-19 PROCEDURE — 82306 VITAMIN D 25 HYDROXY: CPT

## 2021-05-19 RX ORDER — SODIUM CHLORIDE 9 MG/ML
250 INJECTION, SOLUTION INTRAVENOUS ONCE
Status: CANCELLED | OUTPATIENT
Start: 2021-05-26

## 2021-05-19 RX ORDER — SODIUM CHLORIDE 9 MG/ML
250 INJECTION, SOLUTION INTRAVENOUS ONCE
OUTPATIENT
Start: 2021-07-31

## 2021-05-19 NOTE — PROGRESS NOTES
New Patient Office Visit      Date: 2021     Patient Name: Yifan Bowman  MRN: [id number]  : 1975  Referring Physician: Mahesh Elizabeth    Chief Complaint: Establish care for iron deficiency    History of Present Illness: Yifan Bowman is a pleasant 46 y.o. female with a past medical history of MS, seizure, iron deficiency anemia who presents today for evaluation of iron deficiency anemia. The patient is accompanied by their significant other who contributes to the history of their care.  Patient has been followed by her neurologist for her MS.  She is currently on Ampyra and tolerating well.  She has a history of iron deficiency anemia and has been on oral iron for the past several months with no significant improvement in any fatigue or cravings for ice.  She recently had iron studies checked which were notable for an iron level of 38, transferrin saturation 9%, ferritin was not checked at that time.  She states she has never received IV iron in the past.  Denies any dark tarry stools or abnormal vaginal bleeding.  Does note a family history of colon cancer in her mother who was diagnosed at 36.  States that she has been seen by Dr. Flores who recommended a colonoscopy for her but she has not scheduled this.  Otherwise she has no major complaints today and is overall doing well    Oncology History:    Oncology/Hematology History    No history exists.       Subjective      Review of Systems:     Constitutional: Positive for mild fatigue.  Negative for fevers, chills, or weight loss  Eyes: Negative for blurred vision or discharge         Ear/Nose/Throat: Negative for difficulty swallowing, sore throat, LAD                                                       Respiratory: Negative for cough, SOA, wheezing                                                                                        Cardiovascular: Negative for chest pain or palpitations                                                                   Gastrointestinal: Negative for nausea, vomiting or diarrhea                                                                     Genitourinary: Negative for dysuria or hematuria                                                                                           Musculoskeletal: Negative for any joint pains or muscle aches                                                                        Neurologic: Negative for any weakness, headaches, dizziness                                                                         Hematologic: Negative for any easy bleeding or bruising                                                                                   Psychiatric: Negative for anxiety or depression                             Past Medical History:   Past Medical History:   Diagnosis Date   • MS (multiple sclerosis) (CMS/[facility]) 3/23/2017       Past Surgical History:   Past Surgical History:   Procedure Laterality Date   •  SECTION     • REDUCTION MAMMAPLASTY         Family History:   Family History   Problem Relation Age of Onset   • Cancer Paternal Grandfather        Social History:   Social History     Socioeconomic History   • Marital status: Single     Spouse name: Not on file   • Number of children: Not on file   • Years of education: Not on file   • Highest education level: Not on file   Tobacco Use   • Smoking status: Never Smoker   • Smokeless tobacco: Never Used   Substance and Sexual Activity   • Alcohol use: No   • Drug use: No   • Sexual activity: Defer       Medications:     Current Outpatient Medications:   •  Ampyra 10 MG tablet sustained-release 12 hour, TAKE 1 TABLET BY MOUTH EVERY 12 HOURS, Disp: 180 tablet, Rfl: 3  •  ferrous sulfate 325 (65 Fe) MG tablet, Take 325 mg by mouth Daily With Breakfast., Disp: , Rfl:   •  levETIRAcetam XR (KEPPRA XR) 500 MG 24 hr tablet, Take 2 tablets by mouth Daily., Disp: 60 tablet, Rfl: 11    Allergies:   Allergies  "  Allergen Reactions   • Benadryl [Diphenhydramine]        Objective     Physical Exam:  Vital Signs:   Vitals:    05/19/21 0950   BP: 134/64   Pulse: 85   Resp: 16   Temp: 96.2 °F (35.7 °C)   TempSrc: Temporal   SpO2: 98%   Weight: 91.2 kg (201 lb)   Height: 157.5 cm (62.01\")   PainSc: 0-No pain     Pain Score    05/19/21 0950   PainSc: 0-No pain     ECOG Performance Status: 0 - Asymptomatic    Constitutional: NAD, ECOG 0  Eyes: PERRLA, scleral anicteric  ENT: No LAD, no thyromegaly  Respiratory: CTAB, no wheezing, rales, rhonchi  Cardiovascular: RRR, no murmurs, pulses 2+ bilaterally  Abdomen: soft, NT/ND, no HSM  Musculoskeletal: strength 5/5 bilaterally, no c/c/e  Neurologic: A&O x 3, CN II-XII intact grossly  Psych: mood and affect congruent, no SI or HI    Results Review:   No visits with results within 2 Week(s) from this visit.   Latest known visit with results is:   Lab on 04/11/2021   Component Date Value Ref Range Status   • Iron 04/11/2021 38  37 - 145 mcg/dL Final   • Iron Saturation 04/11/2021 9* 20 - 50 % Final   • Transferrin 04/11/2021 295  200 - 360 mg/dL Final   • TIBC 04/11/2021 440  298 - 536 mcg/dL Final       No results found.    Assessment / Plan      Assessment/Plan:   1. Iron deficiency anemia secondary to inadequate dietary iron intake (Primary)/2. Malabsorption of iron  -Most recent iron studies in April consistent with iron level 38, transferrin saturation 9%.  Hemoglobin and ferritin not checked at that time  -Patient states that she is on oral iron for the past several months with no significant provement of her symptoms  -Continues to have fatigue as well as cravings for ice  -Given her mild option of iron, will plan for IV supplementation with Injectafer x2 weekly doses  -Given her family history of colon cancer, advised patient to contact Dr. Flores for screening colonoscopy  -We will repeat iron studies today along with vitamin B12 and folate  -     CBC & Differential; Future  -   "   Ferritin; Future  -     Iron Profile; Future  -     ONCBCN INFUSION APPOINTMENT REQUEST 01; Future  -     ONCBCN INFUSION APPOINTMENT REQUEST 01; Future  -     Vitamin B12; Future  -     Folate; Future  -     Vitamin D 25 Hydroxy; Futur         Follow Up:   Follow-up in 2 months     Shaggy Sun MD  Hematology and Oncology     Please note that portions of this note may have been completed with a voice recognition program. Efforts were made to edit the dictations, but occasionally words are mistranscribed.

## 2021-05-20 ENCOUNTER — TELEPHONE (OUTPATIENT)
Dept: ONCOLOGY | Facility: CLINIC | Age: 46
End: 2021-05-20

## 2021-05-20 RX ORDER — FOLIC ACID 1 MG/1
1 TABLET ORAL DAILY
Qty: 90 TABLET | Refills: 2 | Status: SHIPPED | OUTPATIENT
Start: 2021-05-20 | End: 2022-02-07

## 2021-05-20 RX ORDER — CHOLECALCIFEROL (VITAMIN D3) 1250 MCG
50000 CAPSULE ORAL
Qty: 12 CAPSULE | Refills: 2 | Status: SHIPPED | OUTPATIENT
Start: 2021-05-20 | End: 2022-02-15

## 2021-05-20 NOTE — TELEPHONE ENCOUNTER
Discussed labs with Dr. Sun, advised patient her vitamin D and folic acid are a bit low and we have sent scripts to the pharmacy. Advised insurance has denied the injectafer due to lab results. We will hold off on IV iron at this time as her iron levels are increasing. We will recheck iron studies at next appointment in July. Patient verbalized understanding.

## 2021-06-17 RX ORDER — SODIUM CHLORIDE 9 MG/ML
250 INJECTION, SOLUTION INTRAVENOUS ONCE
Status: CANCELLED | OUTPATIENT
Start: 2021-06-17

## 2021-06-17 RX ORDER — ACETAMINOPHEN 325 MG/1
650 TABLET ORAL ONCE
Status: CANCELLED | OUTPATIENT
Start: 2021-06-17

## 2021-06-18 ENCOUNTER — INFUSION (OUTPATIENT)
Dept: ONCOLOGY | Facility: HOSPITAL | Age: 46
End: 2021-06-18

## 2021-06-18 VITALS
DIASTOLIC BLOOD PRESSURE: 70 MMHG | SYSTOLIC BLOOD PRESSURE: 113 MMHG | RESPIRATION RATE: 16 BRPM | TEMPERATURE: 97.3 F | HEART RATE: 95 BPM

## 2021-06-18 DIAGNOSIS — G35 MS (MULTIPLE SCLEROSIS) (HCC): Primary | ICD-10-CM

## 2021-06-18 PROCEDURE — 25010000002 NATALIZUMAB PER 1 MG: Performed by: NURSE PRACTITIONER

## 2021-06-18 PROCEDURE — 96365 THER/PROPH/DIAG IV INF INIT: CPT

## 2021-06-18 RX ORDER — ACETAMINOPHEN 325 MG/1
650 TABLET ORAL ONCE
Status: CANCELLED | OUTPATIENT
Start: 2021-07-30

## 2021-06-18 RX ORDER — ACETAMINOPHEN 325 MG/1
650 TABLET ORAL ONCE
Status: COMPLETED | OUTPATIENT
Start: 2021-06-18 | End: 2021-06-18

## 2021-06-18 RX ORDER — CETIRIZINE HYDROCHLORIDE 10 MG/1
10 TABLET ORAL DAILY
Status: DISCONTINUED | OUTPATIENT
Start: 2021-06-18 | End: 2021-06-18 | Stop reason: HOSPADM

## 2021-06-18 RX ORDER — SODIUM CHLORIDE 9 MG/ML
250 INJECTION, SOLUTION INTRAVENOUS ONCE
Status: CANCELLED | OUTPATIENT
Start: 2021-07-30

## 2021-06-18 RX ADMIN — CETIRIZINE HYDROCHLORIDE 10 MG: 10 TABLET, FILM COATED ORAL at 08:44

## 2021-06-18 RX ADMIN — ACETAMINOPHEN 650 MG: 325 TABLET ORAL at 08:36

## 2021-06-18 RX ADMIN — NATALIZUMAB 300 MG: 300 INJECTION INTRAVENOUS at 08:44

## 2021-07-21 ENCOUNTER — TELEPHONE (OUTPATIENT)
Dept: ONCOLOGY | Facility: CLINIC | Age: 46
End: 2021-07-21

## 2021-07-21 NOTE — TELEPHONE ENCOUNTER
Caller: HIRAM     Relationship to patient: SELF     Best call back number: [phone]    PATIENT CALLED TO R/S HER 2 MO FOLLOW UP WITH DR HOLT  TODAY 7-21 TO THE NEXT AVAILABLE DATE/TIME  MY ATTEMPT AT A WARM TRANSFER WAS SENT TO A VOICEMAIL FOR [place].   PLEASE CALL AND ADVISE   THANK YOU

## 2021-07-21 NOTE — TELEPHONE ENCOUNTER
Called patient to let her know appt has been moved. Patient answered and when informed her it was office she hung up.

## 2021-07-26 ENCOUNTER — LAB (OUTPATIENT)
Dept: LAB | Facility: HOSPITAL | Age: 46
End: 2021-07-26

## 2021-07-26 ENCOUNTER — OFFICE VISIT (OUTPATIENT)
Dept: ONCOLOGY | Facility: CLINIC | Age: 46
End: 2021-07-26

## 2021-07-26 VITALS
HEART RATE: 88 BPM | WEIGHT: 200 LBS | DIASTOLIC BLOOD PRESSURE: 61 MMHG | SYSTOLIC BLOOD PRESSURE: 112 MMHG | OXYGEN SATURATION: 99 % | BODY MASS INDEX: 36.8 KG/M2 | RESPIRATION RATE: 17 BRPM | TEMPERATURE: 98 F | HEIGHT: 62 IN

## 2021-07-26 DIAGNOSIS — D50.8 IRON DEFICIENCY ANEMIA SECONDARY TO INADEQUATE DIETARY IRON INTAKE: ICD-10-CM

## 2021-07-26 DIAGNOSIS — K90.9 MALABSORPTION OF IRON: ICD-10-CM

## 2021-07-26 DIAGNOSIS — D50.8 IRON DEFICIENCY ANEMIA SECONDARY TO INADEQUATE DIETARY IRON INTAKE: Primary | ICD-10-CM

## 2021-07-26 LAB
25(OH)D3 SERPL-MCNC: 58.5 NG/ML
ALBUMIN SERPL-MCNC: 4 G/DL (ref 3.5–5.2)
ALBUMIN/GLOB SERPL: 1.2 G/DL
ALP SERPL-CCNC: 114 U/L (ref 39–117)
ALT SERPL W P-5'-P-CCNC: 11 U/L (ref 1–33)
ANION GAP SERPL CALCULATED.3IONS-SCNC: 8 MMOL/L (ref 5–15)
AST SERPL-CCNC: 14 U/L (ref 1–32)
BILIRUB SERPL-MCNC: 0.4 MG/DL (ref 0–1.2)
BUN SERPL-MCNC: 8 MG/DL (ref 6–20)
BUN/CREAT SERPL: 9.9 (ref 7–25)
CALCIUM SPEC-SCNC: 9.4 MG/DL (ref 8.6–10.5)
CHLORIDE SERPL-SCNC: 101 MMOL/L (ref 98–107)
CO2 SERPL-SCNC: 27 MMOL/L (ref 22–29)
CREAT SERPL-MCNC: 0.81 MG/DL (ref 0.57–1)
ERYTHROCYTE [DISTWIDTH] IN BLOOD BY AUTOMATED COUNT: 15.7 % (ref 12.3–15.4)
FERRITIN SERPL-MCNC: 24.49 NG/ML (ref 13–150)
FOLATE SERPL-MCNC: >20 NG/ML (ref 4.78–24.2)
GFR SERPL CREATININE-BSD FRML MDRD: 93 ML/MIN/1.73
GLOBULIN UR ELPH-MCNC: 3.4 GM/DL
GLUCOSE SERPL-MCNC: 101 MG/DL (ref 65–99)
HCT VFR BLD AUTO: 35.6 % (ref 34–46.6)
HGB BLD-MCNC: 11.9 G/DL (ref 12–15.9)
IRON 24H UR-MRATE: 117 MCG/DL (ref 37–145)
IRON SATN MFR SERPL: 30 % (ref 20–50)
LYMPHOCYTES # BLD AUTO: 4.9 10*3/MM3 (ref 0.7–3.1)
LYMPHOCYTES NFR BLD AUTO: 47.9 % (ref 19.6–45.3)
MCH RBC QN AUTO: 30.4 PG (ref 26.6–33)
MCHC RBC AUTO-ENTMCNC: 33.4 G/DL (ref 31.5–35.7)
MCV RBC AUTO: 91.1 FL (ref 79–97)
MONOCYTES # BLD AUTO: 0.4 10*3/MM3 (ref 0.1–0.9)
MONOCYTES NFR BLD AUTO: 4 % (ref 5–12)
NEUTROPHILS NFR BLD AUTO: 4.9 10*3/MM3 (ref 1.7–7)
NEUTROPHILS NFR BLD AUTO: 48.1 % (ref 42.7–76)
PLATELET # BLD AUTO: 363 10*3/MM3 (ref 140–450)
PMV BLD AUTO: 7 FL (ref 6–12)
POTASSIUM SERPL-SCNC: 3.9 MMOL/L (ref 3.5–5.2)
PROT SERPL-MCNC: 7.4 G/DL (ref 6–8.5)
RBC # BLD AUTO: 3.91 10*6/MM3 (ref 3.77–5.28)
SODIUM SERPL-SCNC: 136 MMOL/L (ref 136–145)
TIBC SERPL-MCNC: 390 MCG/DL (ref 298–536)
TRANSFERRIN SERPL-MCNC: 262 MG/DL (ref 200–360)
VIT B12 BLD-MCNC: 493 PG/ML (ref 211–946)
WBC # BLD AUTO: 10.2 10*3/MM3 (ref 3.4–10.8)

## 2021-07-26 PROCEDURE — 99214 OFFICE O/P EST MOD 30 MIN: CPT | Performed by: INTERNAL MEDICINE

## 2021-07-26 PROCEDURE — 82746 ASSAY OF FOLIC ACID SERUM: CPT

## 2021-07-26 PROCEDURE — 82607 VITAMIN B-12: CPT

## 2021-07-26 PROCEDURE — 80053 COMPREHEN METABOLIC PANEL: CPT

## 2021-07-26 PROCEDURE — 36415 COLL VENOUS BLD VENIPUNCTURE: CPT

## 2021-07-26 PROCEDURE — 82306 VITAMIN D 25 HYDROXY: CPT

## 2021-07-26 PROCEDURE — 85025 COMPLETE CBC W/AUTO DIFF WBC: CPT

## 2021-07-26 PROCEDURE — 82728 ASSAY OF FERRITIN: CPT

## 2021-07-26 PROCEDURE — 84466 ASSAY OF TRANSFERRIN: CPT

## 2021-07-26 PROCEDURE — 83540 ASSAY OF IRON: CPT

## 2021-07-26 NOTE — PROGRESS NOTES
Follow Up Office Visit      Date: 2021     Patient Name: Yifan Bowman  MRN: [id number]  : 1975  Referring Physician: Mahesh Elizabeth     Chief Complaint:  Follow-up for iron deficiency anemia     History of Present Illness: Yifan Bowman is a pleasant 46 y.o. female with a past medical history of MS, seizure, iron deficiency anemia who presents today for evaluation of iron deficiency anemia. The patient is accompanied by their significant other who contributes to the history of their care.  Patient has been followed by her neurologist for her MS.  She is currently on Ampyra and tolerating well.  She has a history of iron deficiency anemia and has been on oral iron for the past several months with no significant improvement in any fatigue or cravings for ice.  She recently had iron studies checked which were notable for an iron level of 38, transferrin saturation 9%, ferritin was not checked at that time.  She states she has never received IV iron in the past.  Denies any dark tarry stools or abnormal vaginal bleeding.  Does note a family history of colon cancer in her mother who was diagnosed at 36.  States that she has been seen by Dr. Flores who recommended a colonoscopy for her but she has not scheduled this.  Otherwise she has no major complaints today and is overall doing well    Interval History:  Presents clinic for follow-up.  Continues on oral iron is tolerating this well.  Denies any significant constipation, nausea, vomiting.  Has noted some mild increase in energy as well as decreased craving for ice.  Denies any new bleeding or bruising episodes.  Has not seen Dr. Alejo for colonoscopy as of yet    Oncology History:    Oncology/Hematology History    No history exists.       Subjective      Review of Systems:   Constitutional: Negative for fevers, chills, or weight loss  Eyes: Negative for blurred vision or discharge         Ear/Nose/Throat: Negative for  difficulty swallowing, sore throat, LAD                                                       Respiratory: Negative for cough, SOA, wheezing                                                                                        Cardiovascular: Negative for chest pain or palpitations                                                                  Gastrointestinal: Negative for nausea, vomiting or diarrhea                                                                     Genitourinary: Negative for dysuria or hematuria                                                                                           Musculoskeletal: Negative for any joint pains or muscle aches                                                                        Neurologic: Negative for any weakness, headaches, dizziness                                                                         Hematologic: Negative for any easy bleeding or bruising                                                                                   Psychiatric: Negative for anxiety or depression                          Past Medical History/Past Surgical History/ Family History/ Social History: Reviewed by me and unchanged from my previous documentation done on May 2021.     Medications:     Current Outpatient Medications:   •  Ampyra 10 MG tablet sustained-release 12 hour, TAKE 1 TABLET BY MOUTH EVERY 12 HOURS, Disp: 180 tablet, Rfl: 3  •  Cholecalciferol (Vitamin D3) 1.25 MG (76937 UT) capsule, Take 1 capsule by mouth Every 7 (Seven) Days., Disp: 12 capsule, Rfl: 2  •  ferrous sulfate 325 (65 Fe) MG tablet, Take 325 mg by mouth Daily With Breakfast., Disp: , Rfl:   •  folic acid (FOLVITE) 1 MG tablet, Take 1 tablet by mouth Daily., Disp: 90 tablet, Rfl: 2  •  levETIRAcetam XR (KEPPRA XR) 500 MG 24 hr tablet, Take 2 tablets by mouth Daily., Disp: 60 tablet, Rfl: 11    Allergies:   Allergies   Allergen Reactions   • Benadryl [Diphenhydramine]        Objective  "    Physical Exam:  Vital Signs:   Vitals:    07/26/21 1203   BP: 112/61   Pulse: 88   Resp: 17   Temp: 98 °F (36.7 °C)   SpO2: 99%   Weight: 90.7 kg (200 lb)   Height: 157.5 cm (62.01\")   PainSc: 0-No pain     Pain Score    07/26/21 1203   PainSc: 0-No pain     ECOG Performance Status: 1 - Symptomatic but completely ambulatory    Constitutional: NAD, ECOG 1  Eyes: PERRLA, scleral anicteric  ENT: No LAD, no thyromegaly  Respiratory: CTAB, no wheezing, rales, rhonchi  Cardiovascular: RRR, no murmurs, pulses 2+ bilaterally  Abdomen: soft, NT/ND, no HSM  Musculoskeletal: strength 5/5 bilaterally, no c/c/e  Neurologic: A&O x 3, CN II-XII intact grossly    Results Review:   Lab on 07/26/2021   Component Date Value Ref Range Status   • WBC 07/26/2021 10.20  3.40 - 10.80 10*3/mm3 Final   • RBC 07/26/2021 3.91  3.77 - 5.28 10*6/mm3 Final   • Hemoglobin 07/26/2021 11.9* 12.0 - 15.9 g/dL Final   • Hematocrit 07/26/2021 35.6  34.0 - 46.6 % Final   • RDW 07/26/2021 15.7* 12.3 - 15.4 % Final   • MCV 07/26/2021 91.1  79.0 - 97.0 fL Final   • MCH 07/26/2021 30.4  26.6 - 33.0 pg Final   • MCHC 07/26/2021 33.4  31.5 - 35.7 g/dL Final   • MPV 07/26/2021 7.0  6.0 - 12.0 fL Final   • Platelets 07/26/2021 363  140 - 450 10*3/mm3 Final   • Neutrophil % 07/26/2021 48.1  42.7 - 76.0 % Final   • Lymphocyte % 07/26/2021 47.9* 19.6 - 45.3 % Final   • Monocyte % 07/26/2021 4.0* 5.0 - 12.0 % Final   • Neutrophils, Absolute 07/26/2021 4.90  1.70 - 7.00 10*3/mm3 Final   • Lymphocytes, Absolute 07/26/2021 4.90* 0.70 - 3.10 10*3/mm3 Final   • Monocytes, Absolute 07/26/2021 0.40  0.10 - 0.90 10*3/mm3 Final       No results found.    Assessment / Plan      Assessment/Plan:   1. Iron deficiency anemia secondary to inadequate dietary iron intake (Primary)/2. Malabsorption of iron  -Most recent iron studies in April consistent with iron level 38, transferrin saturation 9%.  Hemoglobin and ferritin not checked at that time  -Repeat iron studies in May " 2021 with improving iron studies while on oral iron  -Has now been on oral iron for about 3-4 months and is tolerating well  -Repeat hemoglobin 11.9 today which is increased from 10.8 in May 2021  -Repeat iron studies pending today  -Advised patient to contact Dr. Alejo's office for screening colonoscopy  -On folic acid and vitamin D replacement as below    2.  Folic acid deficiency  -Noted on labs in May 2021  -Started on supplementation.  Tolerating well  -Repeat folate level pending today    3.  Vitamin D deficiency  -Noted on labs in May 2021  -Started on supplementation.  Tolerating well  -Repeat vitamin D level pending today    4.  MS  -On Ampyra per neurology         Follow Up:   Follow-up in 6 months or sooner as needed     Shaggy Sun MD  Hematology and Oncology     Please note that portions of this note may have been completed with a voice recognition program. Efforts were made to edit the dictations, but occasionally words are mistranscribed.

## 2021-07-27 ENCOUNTER — TELEPHONE (OUTPATIENT)
Dept: ONCOLOGY | Facility: CLINIC | Age: 46
End: 2021-07-27

## 2021-07-27 NOTE — TELEPHONE ENCOUNTER
Discussed labs with Dr. Sun, advised patient labs look great. Follow up in 6 months. Patient verbalized understanding.

## 2021-07-30 ENCOUNTER — INFUSION (OUTPATIENT)
Dept: ONCOLOGY | Facility: HOSPITAL | Age: 46
End: 2021-07-30

## 2021-07-30 VITALS
RESPIRATION RATE: 16 BRPM | HEART RATE: 90 BPM | SYSTOLIC BLOOD PRESSURE: 114 MMHG | DIASTOLIC BLOOD PRESSURE: 78 MMHG | TEMPERATURE: 97.4 F

## 2021-07-30 DIAGNOSIS — G35 MS (MULTIPLE SCLEROSIS) (HCC): ICD-10-CM

## 2021-07-30 DIAGNOSIS — D50.8 IRON DEFICIENCY ANEMIA SECONDARY TO INADEQUATE DIETARY IRON INTAKE: ICD-10-CM

## 2021-07-30 DIAGNOSIS — K90.9 MALABSORPTION OF IRON: Primary | ICD-10-CM

## 2021-07-30 PROCEDURE — 96365 THER/PROPH/DIAG IV INF INIT: CPT

## 2021-07-30 PROCEDURE — 25010000002 NATALIZUMAB PER 1 MG: Performed by: NURSE PRACTITIONER

## 2021-07-30 RX ORDER — CETIRIZINE HYDROCHLORIDE 10 MG/1
10 TABLET ORAL ONCE
Status: CANCELLED
Start: 2021-09-10 | End: 2021-09-10

## 2021-07-30 RX ORDER — SODIUM CHLORIDE 9 MG/ML
250 INJECTION, SOLUTION INTRAVENOUS ONCE
Status: CANCELLED | OUTPATIENT
Start: 2021-09-10

## 2021-07-30 RX ORDER — ACETAMINOPHEN 325 MG/1
650 TABLET ORAL ONCE
Status: CANCELLED | OUTPATIENT
Start: 2021-09-10

## 2021-07-30 RX ORDER — ACETAMINOPHEN 325 MG/1
650 TABLET ORAL ONCE
Status: COMPLETED | OUTPATIENT
Start: 2021-07-30 | End: 2021-07-30

## 2021-07-30 RX ORDER — CETIRIZINE HYDROCHLORIDE 10 MG/1
10 TABLET ORAL ONCE
Status: COMPLETED | OUTPATIENT
Start: 2021-07-30 | End: 2021-07-30

## 2021-07-30 RX ADMIN — CETIRIZINE HYDROCHLORIDE 10 MG: 10 TABLET, FILM COATED ORAL at 08:25

## 2021-07-30 RX ADMIN — NATALIZUMAB 300 MG: 300 INJECTION INTRAVENOUS at 08:26

## 2021-07-30 RX ADMIN — ACETAMINOPHEN 650 MG: 325 TABLET ORAL at 08:25

## 2021-08-17 ENCOUNTER — TELEPHONE (OUTPATIENT)
Dept: NEUROLOGY | Facility: CLINIC | Age: 46
End: 2021-08-17

## 2021-08-17 NOTE — TELEPHONE ENCOUNTER
Caller: CHEL HERNÁNDEZ  Relationship to Patient: EDVIN JAMAAL  Phone Number: [phone] ([id number] - REF KEY)    Reason for Call: SHE STATES SHE WILL GET THE APPEAL OVER TO A MD - BUT SHE STATES THEY DO NOT DO URGENT , PATIENT DOES NOT QUALIFY - SHE STATES THE MD SHOULD HAVE AN ANSWER BY 8-24-21     REGARDING TYSABRI APPEAL

## 2021-08-24 ENCOUNTER — OFFICE VISIT (OUTPATIENT)
Dept: NEUROLOGY | Facility: CLINIC | Age: 46
End: 2021-08-24

## 2021-08-24 VITALS
OXYGEN SATURATION: 98 % | BODY MASS INDEX: 37.36 KG/M2 | HEART RATE: 106 BPM | WEIGHT: 203 LBS | HEIGHT: 62 IN | SYSTOLIC BLOOD PRESSURE: 116 MMHG | DIASTOLIC BLOOD PRESSURE: 68 MMHG

## 2021-08-24 DIAGNOSIS — R26.9 GAIT ABNORMALITY: ICD-10-CM

## 2021-08-24 DIAGNOSIS — G35 MS (MULTIPLE SCLEROSIS) (HCC): Primary | ICD-10-CM

## 2021-08-24 DIAGNOSIS — M62.838 MUSCLE SPASTICITY: ICD-10-CM

## 2021-08-24 DIAGNOSIS — M21.372 LEFT FOOT DROP: Chronic | ICD-10-CM

## 2021-08-24 PROCEDURE — 99214 OFFICE O/P EST MOD 30 MIN: CPT | Performed by: PSYCHIATRY & NEUROLOGY

## 2021-08-24 RX ORDER — BACLOFEN 10 MG/1
10 TABLET ORAL 3 TIMES DAILY
Qty: 90 TABLET | Refills: 3 | Status: SHIPPED | OUTPATIENT
Start: 2021-08-24 | End: 2021-10-18 | Stop reason: SDUPTHER

## 2021-08-24 NOTE — ASSESSMENT & PLAN NOTE
Patient presents with left  foot drop and lower extremity weakness resulting in gait instability and fall risk.  Patient requires an AFO to promote joint stabilization, improved gait pattern and to reduce potential for falls.      Custom L AFO and custom insoles secondary to left foot drop and fallen arches n    Refer to PT     Baclofen 10 mg TID

## 2021-08-24 NOTE — PROGRESS NOTES
"Chief Complaint    Multiple Sclerosis    Subjective          Yifan Bowman presents to [facility] NEUROLOGY     History of Present Illness    46 y.o. female returns in follow up.  Last visit on 4/5/21 continued  Tysabri LEIDY, referred to Heme, continued Keppra, Iron, Ampyra.       MRI Brain/Cervical/thoracic 8/14/20 as compared to 4/25/19, no new/enlarging/enhancing T2 PVWM, mild/mod, C4, C6, T5 -  T7 cord lesions     4/19/19 -  Hgb 9.9, HCT 33, restarted Iron      RRMS     T25FW cannot complete     Ampyra improved walking.      Fatigue is mild.  Heat intolerance are moderate.       Continued left 1 - 3 digits decrease to mild numbness.     Worsening gait      Left leg spasticity     Increased L LE spasticity and spastic gait.  Fell at work back in April.         Problem history:     Previously dx and followed by Dr Franklin.  7 years ago fell off porch for unknown reasons.  Left hip is painful and weak.  Pain present when walking.  Left weakness has worsened.  Sx started two months after delivery of 4th child. Band like pain and pressure around lower abdomen.  Noticeable fatigue.  Heat increases sleepiness.  Bladder frequency.  Numbness medial left calf and fingertips.  Started on Aubagio without side effects.       Reviewed Dr Franklin's notes:     Presented on 2/7/17 for left LE pain, numbness and weakness for 6 years.  Also, notes intermittent hand numbness.  MRI results below.  Started on Prednisone and Aubagio 7 mg on 5/4/17  Labs - ACE, Lyme, LUCY, CMP, CBC, TSH, CRP reviewed     My review of films:  MRI Brain 2/15/17 3/30/17  Large left anterior CC black hole, 10 to 15 T2 lesions, multiple CC lesions. No evidence of enhancement     MRI C-spine 3/30/17 C4/5 T2 cord signal w/o enhancement  Objective   Vital Signs:   /68   Pulse 106   Ht 157.5 cm (62.01\")   Wt 92.1 kg (203 lb)   SpO2 98%   BMI 37.12 kg/m²     Physical Exam  Eyes:      Extraocular Movements: EOM normal.      " Pupils: Pupils are equal, round, and reactive to light.   Neurological:      Mental Status: She is oriented to person, place, and time.      Gait: Gait is intact.      Deep Tendon Reflexes: Strength normal.   Psychiatric:         Speech: Speech normal.          Neurologic Exam     Mental Status   Oriented to person, place, and time.   Speech: speech is normal   Level of consciousness: alert  Knowledge: good and consistent with education.   Normal comprehension.     Cranial Nerves   Cranial nerves II through XII intact.     CN II   Visual fields full to confrontation.   Visual acuity: normal  Right visual field deficit: none  Left visual field deficit: none     CN III, IV, VI   Pupils are equal, round, and reactive to light.  Extraocular motions are normal.   Nystagmus: none   Diplopia: none  Ophthalmoparesis: none  Upgaze: normal  Downgaze: normal  Conjugate gaze: present    CN V   Facial sensation intact.   Right corneal reflex: normal  Left corneal reflex: normal    CN VII   Right facial weakness: none  Left facial weakness: none    CN VIII   Hearing: intact    CN IX, X   Palate: symmetric  Right gag reflex: normal  Left gag reflex: normal    CN XI   Right sternocleidomastoid strength: normal  Left sternocleidomastoid strength: normal    CN XII   Tongue: not atrophic  Fasciculations: absent  Tongue deviation: none    Motor Exam   Muscle bulk: normal  Overall muscle tone: normal    Strength   Strength 5/5 throughout.     Sensory Exam   Light touch normal.     Gait, Coordination, and Reflexes     Gait  Gait: normal    Tremor   Resting tremor: absent  Intention tremor: absent  Action tremor: absent    Reflexes   Reflexes 2+ except as noted.      Result Review :   The following data was reviewed by: Richard Coronado MD on 08/24/2021:  Common labs    Common Labsle 3/26/21 3/26/21 5/19/21 7/26/21 7/26/21    0920 0920  1229 1229   Glucose  87   101 (A)   BUN  10   8   Creatinine  0.78   0.81   eGFR  Am  98   93    Sodium  139   136   Potassium  3.9   3.9   Chloride  104   101   Calcium  9.4   9.4   Albumin  4.10   4.00   Total Bilirubin  0.2   0.4   Alkaline Phosphatase  119 (A)   114   AST (SGOT)  16   14   ALT (SGPT)  9   11   WBC 9.84  10.20 10.20    Hemoglobin 10.3 (A)  10.8 (A) 11.9 (A)    Hematocrit 33.1 (A)  32.6 (A) 35.6    Platelets 370  407 363    (A) Abnormal value       Comments are available for some flowsheets but are not being displayed.                     Assessment and Plan    Diagnoses and all orders for this visit:    1. MS (multiple sclerosis) (CMS/[facility]) (Primary)    2. Gait abnormality  -     Ambulatory Referral to Physical Therapy Evaluate and treat    3. Muscle spasticity  Assessment & Plan:  Start Baclofen     Orders:  -     Ambulatory Referral to Physical Therapy Evaluate and treat    4. Left foot drop  Assessment & Plan:  Patient presents with left  foot drop and lower extremity weakness resulting in gait instability and fall risk.  Patient requires an AFO to promote joint stabilization, improved gait pattern and to reduce potential for falls.      Custom L AFO and custom insoles secondary to left foot drop and fallen arches n    Refer to PT     Baclofen 10 mg TID     Orders:  -     Ambulatory Referral to Physical Therapy Evaluate and treat    Other orders  -     baclofen (LIORESAL) 10 MG tablet; Take 1 tablet by mouth 3 (Three) Times a Day.  Dispense: 90 tablet; Refill: 3      Follow Up   No follow-ups on file.  Patient was given instructions and counseling regarding her condition or for health maintenance advice. Please see specific information pulled into the AVS if appropriate.

## 2021-09-10 ENCOUNTER — INFUSION (OUTPATIENT)
Dept: ONCOLOGY | Facility: HOSPITAL | Age: 46
End: 2021-09-10

## 2021-09-10 VITALS
SYSTOLIC BLOOD PRESSURE: 120 MMHG | RESPIRATION RATE: 16 BRPM | HEART RATE: 100 BPM | TEMPERATURE: 97.6 F | DIASTOLIC BLOOD PRESSURE: 71 MMHG

## 2021-09-10 DIAGNOSIS — G35 MS (MULTIPLE SCLEROSIS) (HCC): Primary | ICD-10-CM

## 2021-09-10 PROCEDURE — 25010000002 NATALIZUMAB PER 1 MG: Performed by: NURSE PRACTITIONER

## 2021-09-10 PROCEDURE — 96365 THER/PROPH/DIAG IV INF INIT: CPT

## 2021-09-10 RX ORDER — SODIUM CHLORIDE 9 MG/ML
250 INJECTION, SOLUTION INTRAVENOUS ONCE
Status: CANCELLED | OUTPATIENT
Start: 2021-10-22

## 2021-09-10 RX ORDER — CETIRIZINE HYDROCHLORIDE 10 MG/1
10 TABLET ORAL ONCE
Status: COMPLETED | OUTPATIENT
Start: 2021-09-10 | End: 2021-09-10

## 2021-09-10 RX ORDER — ACETAMINOPHEN 325 MG/1
650 TABLET ORAL ONCE
Status: CANCELLED | OUTPATIENT
Start: 2021-10-22

## 2021-09-10 RX ORDER — ACETAMINOPHEN 325 MG/1
650 TABLET ORAL ONCE
Status: COMPLETED | OUTPATIENT
Start: 2021-09-10 | End: 2021-09-10

## 2021-09-10 RX ORDER — CETIRIZINE HYDROCHLORIDE 10 MG/1
10 TABLET ORAL ONCE
Status: CANCELLED
Start: 2021-10-22 | End: 2021-10-22

## 2021-09-10 RX ADMIN — ACETAMINOPHEN 650 MG: 325 TABLET ORAL at 11:10

## 2021-09-10 RX ADMIN — CETIRIZINE HYDROCHLORIDE 10 MG: 10 TABLET, FILM COATED ORAL at 11:10

## 2021-09-10 RX ADMIN — NATALIZUMAB 300 MG: 300 INJECTION INTRAVENOUS at 11:11

## 2021-10-18 ENCOUNTER — OFFICE VISIT (OUTPATIENT)
Dept: NEUROLOGY | Facility: CLINIC | Age: 46
End: 2021-10-18

## 2021-10-18 VITALS
HEIGHT: 62 IN | HEART RATE: 87 BPM | SYSTOLIC BLOOD PRESSURE: 118 MMHG | OXYGEN SATURATION: 96 % | BODY MASS INDEX: 35.88 KG/M2 | WEIGHT: 195 LBS | TEMPERATURE: 97.7 F | DIASTOLIC BLOOD PRESSURE: 68 MMHG

## 2021-10-18 DIAGNOSIS — G35 MS (MULTIPLE SCLEROSIS) (HCC): Primary | Chronic | ICD-10-CM

## 2021-10-18 DIAGNOSIS — M21.372 LEFT FOOT DROP: Chronic | ICD-10-CM

## 2021-10-18 DIAGNOSIS — R26.9 GAIT ABNORMALITY: Chronic | ICD-10-CM

## 2021-10-18 PROCEDURE — 99214 OFFICE O/P EST MOD 30 MIN: CPT | Performed by: PSYCHIATRY & NEUROLOGY

## 2021-10-18 RX ORDER — BACLOFEN 20 MG/1
20 TABLET ORAL 3 TIMES DAILY
Qty: 270 TABLET | Refills: 3 | Status: SHIPPED | OUTPATIENT
Start: 2021-10-18 | End: 2022-10-18

## 2021-10-18 RX ORDER — ALPRAZOLAM 1 MG/1
TABLET ORAL
Qty: 2 TABLET | Refills: 0 | Status: SHIPPED | OUTPATIENT
Start: 2021-10-18 | End: 2022-02-11

## 2021-10-18 NOTE — PROGRESS NOTES
"Chief Complaint  Multiple Sclerosis    Subjective          Yifan Bowman presents to [facility] NEUROLOGY     History of Present Illness    46 y.o. female returns in follow up.  Last visit on 8/24/21 continued Tysabri LEIDY,  Keppra, Iron, Ampyra, rx Baclofen, referred PT.  .       MRI Brain/Cervical/thoracic 8/14/20 as compared to 4/25/19, no new/enlarging/enhancing T2 PVWM, mild/mod, C4, C6, T5 -  T7 cord lesions     7/26/21 -  CBC,CMP, B12, Vit D - NCS      RRMS     Gait increased spasticity.       Ampyra improved walking.      Fatigue is mild.  Heat intolerance are moderate.       Continued left 1 - 3 digits decrease to mild numbness.      Worsening gait      Left leg spasticity     Increased stiffness and pain in R LE.  Left LE spasticity and foot drop.      Scheduled to get AFO this week.          Problem history:     Previously dx and followed by Dr Franklin.  7 years ago fell off porch for unknown reasons.  Left hip is painful and weak.  Pain present when walking.  Left weakness has worsened.  Sx started two months after delivery of 4th child. Band like pain and pressure around lower abdomen.  Noticeable fatigue.  Heat increases sleepiness.  Bladder frequency.  Numbness medial left calf and fingertips.  Started on Aubagio without side effects.       Reviewed Dr Franklin's notes:     Presented on 2/7/17 for left LE pain, numbness and weakness for 6 years.  Also, notes intermittent hand numbness.  MRI results below.  Started on Prednisone and Aubagio 7 mg on 5/4/17  Labs - ACE, Lyme, LUCY, CMP, CBC, TSH, CRP reviewed     My review of films:    MRI Brain 2/15/17 3/30/17  Large left anterior CC black hole, 10 to 15 T2 lesions, multiple CC lesions. No evidence of enhancement     MRI C-spine 3/30/17 C4/5 T2 cord signal w/o enhancement        Objective   Vital Signs:   /68   Pulse 87   Temp 97.7 °F (36.5 °C)   Ht 157.5 cm (62.01\")   Wt 88.5 kg (195 lb)   SpO2 96%   BMI 35.66 kg/m² "     Physical Exam  Eyes:      Extraocular Movements: EOM normal.      Pupils: Pupils are equal, round, and reactive to light.   Neurological:      Mental Status: She is oriented to person, place, and time.      Deep Tendon Reflexes:      Reflex Scores:       Tricep reflexes are 3+ on the right side.       Bicep reflexes are 3+ on the right side and 3+ on the left side.       Brachioradialis reflexes are 3+ on the right side and 3+ on the left side.       Patellar reflexes are 4+ on the right side and 4+ on the left side.       Achilles reflexes are 4+ on the right side and 4+ on the left side.  Psychiatric:         Speech: Speech normal.          Neurologic Exam     Mental Status   Oriented to person, place, and time.   Speech: speech is normal   Level of consciousness: alert  Knowledge: good and consistent with education.   Normal comprehension.     Cranial Nerves   Cranial nerves II through XII intact.     CN II   Visual fields full to confrontation.   Visual acuity: normal  Right visual field deficit: none  Left visual field deficit: none     CN III, IV, VI   Pupils are equal, round, and reactive to light.  Extraocular motions are normal.   Nystagmus: none   Diplopia: none  Ophthalmoparesis: none  Upgaze: normal  Downgaze: normal  Conjugate gaze: present    CN V   Facial sensation intact.   Right corneal reflex: normal  Left corneal reflex: normal    CN VII   Right facial weakness: none  Left facial weakness: none    CN VIII   Hearing: intact    CN IX, X   Palate: symmetric  Right gag reflex: normal  Left gag reflex: normal    CN XI   Right sternocleidomastoid strength: normal  Left sternocleidomastoid strength: normal    CN XII   Tongue: not atrophic  Fasciculations: absent  Tongue deviation: none    Motor Exam   Muscle bulk: normal  Overall muscle tone: normal  Right leg tone: spastic  Left leg tone: spastic    Strength   Strength 5/5 except as noted.   Right iliopsoas: 4/5  Left iliopsoas: 4/5  Right  quadriceps: 4/5  Left quadriceps: 4/5  Right hamstrin/5  Left hamstrin/5  Right glutei: 4/5  Left glutei: 4/5  Right anterior tibial: 3/5  Left anterior tibial: 2/5  Right posterior tibial: 3/5  Left posterior tibial: 2/5  Right peroneal: 3/5  Left peroneal: 2/5  Right gastroc: 3/5  Left gastroc: 2/5    Sensory Exam   Light touch normal.     Gait, Coordination, and Reflexes     Gait  Gait: spastic    Tremor   Resting tremor: absent  Intention tremor: absent  Action tremor: absent    Reflexes   Right brachioradialis: 3+  Left brachioradialis: 3+  Right biceps: 3+  Left biceps: 3+  Right triceps: 3+  Right patellar: 4+  Left patellar: 4+  Right achilles: 4+  Left achilles: 4+     Result Review :   The following data was reviewed by: Richard Coronado MD on 10/18/2021:  Common labs    Common Labsle 3/26/21 3/26/21 5/19/21 7/26/21 7/26/21    0920 0920  1229 1229   Glucose  87   101 (A)   BUN  10   8   Creatinine  0.78   0.81   eGFR African Am  98   93   Sodium  139   136   Potassium  3.9   3.9   Chloride  104   101   Calcium  9.4   9.4   Albumin  4.10   4.00   Total Bilirubin  0.2   0.4   Alkaline Phosphatase  119 (A)   114   AST (SGOT)  16   14   ALT (SGPT)  9   11   WBC 9.84  10.20 10.20    Hemoglobin 10.3 (A)  10.8 (A) 11.9 (A)    Hematocrit 33.1 (A)  32.6 (A) 35.6    Platelets 370  407 363    (A) Abnormal value       Comments are available for some flowsheets but are not being displayed.                     Assessment and Plan    Diagnoses and all orders for this visit:    1. MS (multiple sclerosis) (HCC) (Primary)  -     MRI Brain With & Without Contrast; Future  -     MRI Cervical Spine With & Without Contrast; Future  -     ALPRAZolam (Xanax) 1 MG tablet; Take one tablet 30 minutes prior to MRI and may repeat once if necessary  Dispense: 2 tablet; Refill: 0    2. Left foot drop    3. Gait abnormality  -     Ambulatory Referral to Orthopedic Surgery    Other orders  -     baclofen (LIORESAL) 20 MG tablet;  Take 1 tablet by mouth 3 (Three) Times a Day.  Dispense: 270 tablet; Refill: 3        Follow Up   No follow-ups on file.  Patient was given instructions and counseling regarding her condition or for health maintenance advice. Please see specific information pulled into the AVS if appropriate.

## 2021-10-19 ENCOUNTER — TREATMENT (OUTPATIENT)
Dept: PHYSICAL THERAPY | Facility: CLINIC | Age: 46
End: 2021-10-19

## 2021-10-19 DIAGNOSIS — R26.89 BALANCE PROBLEM: ICD-10-CM

## 2021-10-19 DIAGNOSIS — R26.9 GAIT ABNORMALITY: Primary | ICD-10-CM

## 2021-10-19 DIAGNOSIS — M21.372 FOOT DROP, LEFT: ICD-10-CM

## 2021-10-19 PROCEDURE — 97162 PT EVAL MOD COMPLEX 30 MIN: CPT | Performed by: PHYSICAL THERAPIST

## 2021-10-19 PROCEDURE — 97110 THERAPEUTIC EXERCISES: CPT | Performed by: PHYSICAL THERAPIST

## 2021-10-19 NOTE — PROGRESS NOTES
"  Physical Therapy Initial Evaluation and Plan of Care      Patient: Yifan Bowman   : 1975  Diagnosis/ICD-10 Code:  Gait abnormality [R26.9]  Referring practitioner: Richard Coronado MD  Date of Initial Visit: 10/19/2021  Today's Date: 10/19/2021  Patient seen for 1 sessions           Subjective Questionnaire: n/a      Subjective Evaluation    History of Present Illness  Mechanism of injury: Pt went to see Dr. Coronado yesterday for a follow up from beginning Baclofen.  Pt reports that it hasn't done anything and so Dr. Coronado increased the dose and pt is now in PT.  Pt has an appointment Friday at 11 am for a new AFO/shoe insoles.  Pt uses rollator at the house but does not at work.  Someone at work helps her in and pt grabs furniture or walls when walking when outside the home.  Pt reports having 3 falls when her left toe catches.  Pt drives and works 30 hours a week.  Pt owns SC, rollator, tub bath, I with ADLs.        Patient Occupation: works in food nutrition at a school Quality of life: good    Pain  Current pain ratin  At best pain ratin  At worst pain ratin  Location: LBP; R hip/low side back  Quality: discomfort and dull ache  Relieving factors: rest  Aggravating factors: standing    Social Support  Lives in: multiple-level home (3 ruby with B HRs; 15 steps to BR with 1HR)  Lives with: 5 kids (17, 16, 14, 12, 11); mom lives nearby.    Hand dominance: right    Treatments  Previous treatment: physical therapy  Patient Goals  Patient goals for therapy: improved balance and increased strength  Patient goal: walking with new brace;            Objective          Neurological Testing     Sensation     Ankle/Foot   Left Ankle/Foot   Intact: light touch and proprioception    Right Ankle/Foot   Intact: light touch and proprioception     Additional Neurological Details  L hip ab/adduction 1+ tone  L knee flexion and extension 1+ tone  Pt reports that left toes \"tingle\"    Joint Play " "    Additional Joint Play Details  L ankle PROM DF 8 degrees from neutral    Strength/Myotome Testing     Left Hip   Planes of Motion   Flexion: 2+  Extension: 3-  Abduction: 3-  Adduction: 3-    Right Hip   Planes of Motion   Flexion: 3-  Extension: 3+  Abduction: 3+  Adduction: 3+    Left Knee   Flexion: 2+  Extension: 3+    Right Knee   Flexion: 3+  Extension: 4-    Left Ankle/Foot   Dorsiflexion: 2-  Plantar flexion: 2-    Right Ankle/Foot   Dorsiflexion: 3+  Plantar flexion: 3+    Left Hip Flexibility Comments:   Tightness in hamstring, glut max, piriformis, quad with PROM    Ambulation     Ambulation: Level Surfaces   Ambulation without assistive device: minimum assist    Additional Level Surfaces Ambulation Details  Pt educated that she needs to use her rollator at all times    Ambulation: Stairs     Additional Stairs Ambulation Details  n/t    Observational Gait   Increased right stance time. Decreased walking speed and stride length.     Additional Observational Gait Details  Pt has to lean onto walls and onto a persons arm for L clearance with swing secondary to foot drop; decreased L knee and hip flexion with swing.  Pt demonstrates R lateral lean for L foot drop with swing.  Pt occasionally crosses midline with gait when placing her foot. Pt paused and had to hold onto wall and \"regroup\" with 10 meter walk.    Quality of Movement During Gait     Knee    Knee (Left): Positive recurvatum and valgus.   Knee (Right): Positive recurvatum and valgus.     Ankle    Ankle (Left): Positive foot drop.     Functional Assessment     Comments  Bed mobility with extra time to perform        PT Neuro         Assessment & Plan     Assessment  Impairments: abnormal gait, abnormal muscle tone, activity intolerance, impaired balance, impaired physical strength, lacks appropriate home exercise program and safety issue  Other impairment: balance  Assessment details: Pt presents with evolving symptoms secondary to MS.  Pt has " increased tone L LE, L foot drop and impaired strength, balance and overall functional mobility.  Pt to benefit from L AFO/toe-off brace for foot drop and B orthotics for pes roberta.  Pt to benefit from skilled PT services. Pt educated to use rollator at all times secondary to increased fall risk and modified gait mechanics.  Educated pts mother that pt needs to walk on ambulation for exercise until after pt receives her AFO/toe-off brace and insoles.  Prognosis: fair  Functional Limitations: carrying objects, lifting, walking, pulling, pushing, moving in bed, standing, stooping, reaching behind back and reaching overhead  Goals  Plan Goals: STG (6 visits)  1. Patient to improve STEINER balance score to >/= 30/56 to decrease client's risk of falls.  2. Patient to perform TUG within 45 sec with AD without LOB for improved functional mobility.  3. Patient to ambulate 10 meters with AD within 60 sec and without LOB for improved gait jena and functional mobility.    LTG (12 visits)  1. Patient to improve STEINER balance score to >/= 38 /56 to decrease client's risk of falls.  2. Patient to perform TUG within 30 sec with AD and without LOB for improved functional mobility.  3. Patient to ambulate 10 meters with AD within 45 sec without LOB for improved gait jena and functional mobility.  4. Patient to be I with HEP.  5. Pt to be able to tracee/doff AFO/toe-off brace for safety with gait.    Plan  Therapy options: will be seen for skilled physical therapy services  Planned modality interventions: electrical stimulation/Russian stimulation  Planned therapy interventions: fine motor coordination training, gait training, home exercise program, neuromuscular re-education, strengthening, abdominal trunk stabilization, balance/weight-bearing training, motor coordination training, orthotic fitting/training, therapeutic activities and flexibility  Frequency: 1x week  Duration in visits: 12  Treatment plan discussed with:  patient  Plan details: Patient will be seen 1x/wk x 12 visits with treatment to include strengthening, stretching, functional e-stim therapy, neuromuscular re-education, balance, gait and endurance training.         Timed:  Manual Therapy:    0     mins  54697;  Therapeutic Exercise:    10     mins  66096;     Neuromuscular Anita:    0    mins  43407;    Therapeutic Activity:     0     mins  98003;     Gait Trainin     mins  61634;     Ultrasound:     0     mins  17526;    Electrical Stimulation:    0     mins  02031 ( );    Untimed:  Electrical Stimulation:    0     mins  97432 ( );  Mechanical Traction:    0     mins  29408;     Timed Treatment:   10   mins   Total Treatment:     45   mins    PT SIGNATURE: Meghan Lopez, PT   DATE TREATMENT INITIATED: 10/19/2021    Initial Certification  Certification Period: 10/19/4559wdpx8  I certify that the therapy services are furnished while this patient is under my care.  The services outlined above are required by this patient, and will be reviewed every 90 days.     PHYSICIAN: Richard Coronado MD      DATE:     Please sign and return via fax to [phone].. Thank you, [facility].

## 2021-10-22 ENCOUNTER — INFUSION (OUTPATIENT)
Dept: ONCOLOGY | Facility: HOSPITAL | Age: 46
End: 2021-10-22

## 2021-10-22 VITALS
RESPIRATION RATE: 16 BRPM | DIASTOLIC BLOOD PRESSURE: 74 MMHG | SYSTOLIC BLOOD PRESSURE: 124 MMHG | HEART RATE: 100 BPM | TEMPERATURE: 98.1 F

## 2021-10-22 DIAGNOSIS — G35 MS (MULTIPLE SCLEROSIS) (HCC): ICD-10-CM

## 2021-10-22 DIAGNOSIS — G35 MULTIPLE SCLEROSIS (HCC): Primary | ICD-10-CM

## 2021-10-22 PROCEDURE — 96365 THER/PROPH/DIAG IV INF INIT: CPT

## 2021-10-22 PROCEDURE — 25010000002 NATALIZUMAB PER 1 MG: Performed by: NURSE PRACTITIONER

## 2021-10-22 RX ORDER — CETIRIZINE HYDROCHLORIDE 10 MG/1
10 TABLET ORAL ONCE
Status: CANCELLED
Start: 2021-12-03 | End: 2021-12-03

## 2021-10-22 RX ORDER — ACETAMINOPHEN 325 MG/1
650 TABLET ORAL ONCE
Status: CANCELLED | OUTPATIENT
Start: 2021-12-03

## 2021-10-22 RX ORDER — SODIUM CHLORIDE 9 MG/ML
250 INJECTION, SOLUTION INTRAVENOUS ONCE
Status: CANCELLED | OUTPATIENT
Start: 2021-12-03

## 2021-10-22 RX ORDER — ACETAMINOPHEN 325 MG/1
650 TABLET ORAL ONCE
Status: COMPLETED | OUTPATIENT
Start: 2021-10-22 | End: 2021-10-22

## 2021-10-22 RX ORDER — CETIRIZINE HYDROCHLORIDE 10 MG/1
10 TABLET ORAL ONCE
Status: COMPLETED | OUTPATIENT
Start: 2021-10-22 | End: 2021-10-22

## 2021-10-22 RX ADMIN — CETIRIZINE HYDROCHLORIDE 10 MG: 10 TABLET, FILM COATED ORAL at 09:40

## 2021-10-22 RX ADMIN — NATALIZUMAB 300 MG: 300 INJECTION INTRAVENOUS at 09:41

## 2021-10-22 RX ADMIN — ACETAMINOPHEN 650 MG: 325 TABLET ORAL at 09:40

## 2021-10-25 ENCOUNTER — TELEPHONE (OUTPATIENT)
Dept: NEUROLOGY | Facility: CLINIC | Age: 46
End: 2021-10-25

## 2021-10-25 NOTE — TELEPHONE ENCOUNTER
Caller: SIMA     Relationship: [facility]     Best call back number: [phone]    What form or medical record are you requesting: LAST OFFICE NOTE   Who is requesting this form or medical record from you: [facility].     How would you like to receive the form or medical records (pick-up, mail, fax):   FAX- [phone]    Timeframe paperwork needed: ASAP   Additional notes: REFERRED FOR ORTHO NEEDING LAST OFFICE NOTE FROM 10/18/21 FOR [facility] FOR PT.

## 2021-10-28 ENCOUNTER — OFFICE VISIT (OUTPATIENT)
Dept: INTERNAL MEDICINE | Facility: CLINIC | Age: 46
End: 2021-10-28

## 2021-10-28 VITALS
WEIGHT: 196.2 LBS | RESPIRATION RATE: 14 BRPM | DIASTOLIC BLOOD PRESSURE: 68 MMHG | HEART RATE: 97 BPM | HEIGHT: 62 IN | OXYGEN SATURATION: 97 % | SYSTOLIC BLOOD PRESSURE: 118 MMHG | TEMPERATURE: 97.9 F | BODY MASS INDEX: 36.1 KG/M2

## 2021-10-28 DIAGNOSIS — M21.372 LEFT FOOT DROP: ICD-10-CM

## 2021-10-28 DIAGNOSIS — G35 MS (MULTIPLE SCLEROSIS): Primary | ICD-10-CM

## 2021-10-28 DIAGNOSIS — M62.838 MUSCLE SPASTICITY: ICD-10-CM

## 2021-10-28 DIAGNOSIS — M54.17 LUMBOSACRAL RADICULOPATHY: ICD-10-CM

## 2021-10-28 RX ORDER — PREGABALIN 100 MG/1
100 CAPSULE ORAL 2 TIMES DAILY
Qty: 60 CAPSULE | Refills: 1 | Status: SHIPPED | OUTPATIENT
Start: 2021-10-28

## 2021-10-29 LAB
CONV INDEX VALUE: 2.98
INTERPRETATION: ABNORMAL
INTERPRETATION: ABNORMAL
JCPYV AB SERPL QL IA: POSITIVE

## 2021-11-04 ENCOUNTER — TELEPHONE (OUTPATIENT)
Dept: INTERNAL MEDICINE | Facility: CLINIC | Age: 46
End: 2021-11-04

## 2021-11-04 NOTE — TELEPHONE ENCOUNTER
Foot drop is Neuromuscular dept, they are faxing a form to complete for records.  MS would do a virtual triage to determine what is appropriate. Referring Physician dept will call pt and schedule this.

## 2021-11-04 NOTE — TELEPHONE ENCOUNTER
[facility] CALLED ABOUT THE REFERRAL FOR DR. CONRAD AT THE [facility].  THIS PROVIDER DOES NOT SEE PT'S FOR FOOT DROP OR MS    PLEASE ADVISE VELASQUEZ -[phone]

## 2021-11-04 NOTE — TELEPHONE ENCOUNTER
If you have time, can you call [facility] neurology and see who will see MS and foot drop patients?

## 2021-11-16 ENCOUNTER — APPOINTMENT (OUTPATIENT)
Dept: MRI IMAGING | Facility: HOSPITAL | Age: 46
End: 2021-11-16

## 2021-11-24 ENCOUNTER — TELEPHONE (OUTPATIENT)
Dept: NEUROLOGY | Facility: CLINIC | Age: 46
End: 2021-11-24

## 2021-11-24 NOTE — TELEPHONE ENCOUNTER
Pharmacy Name:  [facility]    Pharmacy representative name: GINA    Pharmacy representative phone number: [phone]    REFKEY# [id number]    What medication are you calling in regards to: TYSABRI    What question does the pharmacy have: A PRIOR AUTH IS NEEDED    Additional notes: SHE STATES SHE FAXED OVER A REQUEST ON 11-11-21, SHE STATES SHE CAN FAX ANOTHER IF NEEDED (I ADVISED GO AHEAD JUST IN CASE AND I WAS PLACING MESSAGE)

## 2021-11-30 ENCOUNTER — HOSPITAL ENCOUNTER (OUTPATIENT)
Dept: MRI IMAGING | Facility: HOSPITAL | Age: 46
Discharge: HOME | End: 2021-11-30
Admitting: NURSE PRACTITIONER

## 2021-11-30 DIAGNOSIS — M54.17 LUMBOSACRAL RADICULOPATHY: ICD-10-CM

## 2021-11-30 DIAGNOSIS — G35 MS (MULTIPLE SCLEROSIS) (HCC): ICD-10-CM

## 2021-11-30 DIAGNOSIS — M21.372 LEFT FOOT DROP: ICD-10-CM

## 2021-11-30 PROCEDURE — 0 GADOBENATE DIMEGLUMINE 529 MG/ML SOLUTION: Performed by: NURSE PRACTITIONER

## 2021-11-30 PROCEDURE — A9577 INJ MULTIHANCE: HCPCS | Performed by: NURSE PRACTITIONER

## 2021-11-30 PROCEDURE — 72158 MRI LUMBAR SPINE W/O & W/DYE: CPT

## 2021-11-30 RX ADMIN — GADOBENATE DIMEGLUMINE 17 ML: 529 INJECTION, SOLUTION INTRAVENOUS at 16:00

## 2021-12-03 ENCOUNTER — INFUSION (OUTPATIENT)
Dept: ONCOLOGY | Facility: HOSPITAL | Age: 46
End: 2021-12-03

## 2021-12-03 VITALS
HEART RATE: 95 BPM | TEMPERATURE: 98.2 F | RESPIRATION RATE: 18 BRPM | SYSTOLIC BLOOD PRESSURE: 119 MMHG | DIASTOLIC BLOOD PRESSURE: 66 MMHG

## 2021-12-03 DIAGNOSIS — G35 MS (MULTIPLE SCLEROSIS) (HCC): Primary | ICD-10-CM

## 2021-12-03 PROCEDURE — 96365 THER/PROPH/DIAG IV INF INIT: CPT

## 2021-12-03 PROCEDURE — 25010000002 NATALIZUMAB PER 1 MG: Performed by: NURSE PRACTITIONER

## 2021-12-03 RX ORDER — SODIUM CHLORIDE 9 MG/ML
250 INJECTION, SOLUTION INTRAVENOUS ONCE
Status: CANCELLED | OUTPATIENT
Start: 2022-01-14

## 2021-12-03 RX ORDER — ACETAMINOPHEN 325 MG/1
650 TABLET ORAL ONCE
Status: CANCELLED | OUTPATIENT
Start: 2022-01-14

## 2021-12-03 RX ORDER — CETIRIZINE HYDROCHLORIDE 10 MG/1
10 TABLET ORAL ONCE
Status: CANCELLED
Start: 2022-01-14 | End: 2022-01-14

## 2021-12-03 RX ORDER — CETIRIZINE HYDROCHLORIDE 10 MG/1
10 TABLET ORAL ONCE
Status: COMPLETED | OUTPATIENT
Start: 2021-12-03 | End: 2021-12-03

## 2021-12-03 RX ORDER — ACETAMINOPHEN 325 MG/1
650 TABLET ORAL ONCE
Status: COMPLETED | OUTPATIENT
Start: 2021-12-03 | End: 2021-12-03

## 2021-12-03 RX ADMIN — NATALIZUMAB 300 MG: 300 INJECTION INTRAVENOUS at 09:26

## 2021-12-03 RX ADMIN — CETIRIZINE HYDROCHLORIDE 10 MG: 10 TABLET, FILM COATED ORAL at 09:23

## 2021-12-03 RX ADMIN — ACETAMINOPHEN 650 MG: 325 TABLET ORAL at 09:23

## 2021-12-16 ENCOUNTER — SPECIALTY PHARMACY (OUTPATIENT)
Dept: ONCOLOGY | Facility: HOSPITAL | Age: 46
End: 2021-12-16

## 2021-12-16 NOTE — PROGRESS NOTES
Specialty Pharmacy Refill Coordination Note     Enrolled patient in MS program. Patient fills brand Ampyra at [facility]. Plan to f/u in February for refills.                 Follow-up: 2/16/2022     Meghan Bess, Pharmacy Technician  Specialty Pharmacy Technician

## 2022-01-14 ENCOUNTER — APPOINTMENT (OUTPATIENT)
Dept: ONCOLOGY | Facility: HOSPITAL | Age: 47
End: 2022-01-14

## 2022-01-20 ENCOUNTER — APPOINTMENT (OUTPATIENT)
Dept: MRI IMAGING | Facility: HOSPITAL | Age: 47
End: 2022-01-20

## 2022-01-20 ENCOUNTER — HOSPITAL ENCOUNTER (OUTPATIENT)
Dept: MRI IMAGING | Facility: HOSPITAL | Age: 47
Discharge: HOME | End: 2022-01-20

## 2022-01-20 ENCOUNTER — HOSPITAL ENCOUNTER (OUTPATIENT)
Dept: MRI IMAGING | Facility: HOSPITAL | Age: 47
End: 2022-01-20

## 2022-01-20 DIAGNOSIS — G35 MS (MULTIPLE SCLEROSIS): Chronic | ICD-10-CM

## 2022-01-20 PROCEDURE — A9577 INJ MULTIHANCE: HCPCS | Performed by: PSYCHIATRY & NEUROLOGY

## 2022-01-20 PROCEDURE — 72156 MRI NECK SPINE W/O & W/DYE: CPT

## 2022-01-20 PROCEDURE — 70553 MRI BRAIN STEM W/O & W/DYE: CPT

## 2022-01-20 PROCEDURE — 0 GADOBENATE DIMEGLUMINE 529 MG/ML SOLUTION: Performed by: PSYCHIATRY & NEUROLOGY

## 2022-01-20 RX ADMIN — GADOBENATE DIMEGLUMINE 18 ML: 529 INJECTION, SOLUTION INTRAVENOUS at 13:57

## 2022-01-24 ENCOUNTER — TELEPHONE (OUTPATIENT)
Dept: NEUROLOGY | Facility: CLINIC | Age: 47
End: 2022-01-24

## 2022-01-24 NOTE — TELEPHONE ENCOUNTER
----- Message from Richard Coronado MD sent at 1/24/2022  7:49 AM EST -----  Notify pt her MRI is stable

## 2022-01-31 ENCOUNTER — INFUSION (OUTPATIENT)
Dept: ONCOLOGY | Facility: HOSPITAL | Age: 47
End: 2022-01-31

## 2022-01-31 ENCOUNTER — OFFICE VISIT (OUTPATIENT)
Dept: ONCOLOGY | Facility: CLINIC | Age: 47
End: 2022-01-31

## 2022-01-31 VITALS
DIASTOLIC BLOOD PRESSURE: 83 MMHG | TEMPERATURE: 98.1 F | RESPIRATION RATE: 16 BRPM | HEART RATE: 107 BPM | SYSTOLIC BLOOD PRESSURE: 143 MMHG

## 2022-01-31 VITALS
HEART RATE: 94 BPM | BODY MASS INDEX: 35.07 KG/M2 | WEIGHT: 190.6 LBS | RESPIRATION RATE: 16 BRPM | HEIGHT: 62 IN | TEMPERATURE: 96.6 F | SYSTOLIC BLOOD PRESSURE: 124 MMHG | OXYGEN SATURATION: 99 % | DIASTOLIC BLOOD PRESSURE: 81 MMHG

## 2022-01-31 DIAGNOSIS — D50.8 IRON DEFICIENCY ANEMIA SECONDARY TO INADEQUATE DIETARY IRON INTAKE: Primary | ICD-10-CM

## 2022-01-31 DIAGNOSIS — K90.9 MALABSORPTION OF IRON: ICD-10-CM

## 2022-01-31 DIAGNOSIS — G35 MS (MULTIPLE SCLEROSIS): ICD-10-CM

## 2022-01-31 LAB
BASOPHILS # BLD AUTO: 0.02 10*3/MM3 (ref 0–0.2)
BASOPHILS NFR BLD AUTO: 0.3 % (ref 0–1.5)
DEPRECATED RDW RBC AUTO: 50.3 FL (ref 37–54)
EOSINOPHIL # BLD AUTO: 0.1 10*3/MM3 (ref 0–0.4)
EOSINOPHIL NFR BLD AUTO: 1.3 % (ref 0.3–6.2)
ERYTHROCYTE [DISTWIDTH] IN BLOOD BY AUTOMATED COUNT: 14.4 % (ref 12.3–15.4)
FERRITIN SERPL-MCNC: 27.94 NG/ML (ref 13–150)
HCT VFR BLD AUTO: 36.2 % (ref 34–46.6)
HGB BLD-MCNC: 12.1 G/DL (ref 12–15.9)
IMM GRANULOCYTES # BLD AUTO: 0.03 10*3/MM3 (ref 0–0.05)
IMM GRANULOCYTES NFR BLD AUTO: 0.4 % (ref 0–0.5)
IRON 24H UR-MRATE: 74 MCG/DL (ref 37–145)
IRON SATN MFR SERPL: 20 % (ref 20–50)
LYMPHOCYTES # BLD AUTO: 3.21 10*3/MM3 (ref 0.7–3.1)
LYMPHOCYTES NFR BLD AUTO: 40.3 % (ref 19.6–45.3)
MCH RBC QN AUTO: 32 PG (ref 26.6–33)
MCHC RBC AUTO-ENTMCNC: 33.4 G/DL (ref 31.5–35.7)
MCV RBC AUTO: 95.8 FL (ref 79–97)
MONOCYTES # BLD AUTO: 0.49 10*3/MM3 (ref 0.1–0.9)
MONOCYTES NFR BLD AUTO: 6.2 % (ref 5–12)
NEUTROPHILS NFR BLD AUTO: 4.11 10*3/MM3 (ref 1.7–7)
NEUTROPHILS NFR BLD AUTO: 51.5 % (ref 42.7–76)
NRBC BLD AUTO-RTO: 0 /100 WBC (ref 0–0.2)
PLATELET # BLD AUTO: 315 10*3/MM3 (ref 140–450)
PMV BLD AUTO: 10.2 FL (ref 6–12)
RBC # BLD AUTO: 3.78 10*6/MM3 (ref 3.77–5.28)
TIBC SERPL-MCNC: 378 MCG/DL (ref 298–536)
TRANSFERRIN SERPL-MCNC: 254 MG/DL (ref 200–360)
WBC NRBC COR # BLD: 7.96 10*3/MM3 (ref 3.4–10.8)

## 2022-01-31 PROCEDURE — 25010000002 NATALIZUMAB PER 1 MG: Performed by: NURSE PRACTITIONER

## 2022-01-31 PROCEDURE — 82728 ASSAY OF FERRITIN: CPT

## 2022-01-31 PROCEDURE — 85025 COMPLETE CBC W/AUTO DIFF WBC: CPT

## 2022-01-31 PROCEDURE — 83540 ASSAY OF IRON: CPT

## 2022-01-31 PROCEDURE — 96365 THER/PROPH/DIAG IV INF INIT: CPT

## 2022-01-31 PROCEDURE — 99214 OFFICE O/P EST MOD 30 MIN: CPT | Performed by: INTERNAL MEDICINE

## 2022-01-31 PROCEDURE — 84466 ASSAY OF TRANSFERRIN: CPT

## 2022-01-31 RX ORDER — CETIRIZINE HYDROCHLORIDE 10 MG/1
10 TABLET ORAL ONCE
Status: CANCELLED
Start: 2022-02-25 | End: 2022-02-25

## 2022-01-31 RX ORDER — CETIRIZINE HYDROCHLORIDE 10 MG/1
10 TABLET ORAL ONCE
Status: COMPLETED | OUTPATIENT
Start: 2022-01-31 | End: 2022-01-31

## 2022-01-31 RX ORDER — ACETAMINOPHEN 325 MG/1
650 TABLET ORAL ONCE
Status: CANCELLED | OUTPATIENT
Start: 2022-02-25

## 2022-01-31 RX ORDER — SODIUM CHLORIDE 9 MG/ML
250 INJECTION, SOLUTION INTRAVENOUS ONCE
Status: COMPLETED | OUTPATIENT
Start: 2022-01-31 | End: 2022-01-31

## 2022-01-31 RX ORDER — ACETAMINOPHEN 325 MG/1
650 TABLET ORAL ONCE
Status: COMPLETED | OUTPATIENT
Start: 2022-01-31 | End: 2022-01-31

## 2022-01-31 RX ORDER — SODIUM CHLORIDE 9 MG/ML
250 INJECTION, SOLUTION INTRAVENOUS ONCE
Status: CANCELLED | OUTPATIENT
Start: 2022-02-25

## 2022-01-31 RX ADMIN — CETIRIZINE HYDROCHLORIDE 10 MG: 10 TABLET, FILM COATED ORAL at 14:13

## 2022-01-31 RX ADMIN — ACETAMINOPHEN 650 MG: 325 TABLET ORAL at 14:13

## 2022-01-31 RX ADMIN — SODIUM CHLORIDE 250 ML: 9 INJECTION, SOLUTION INTRAVENOUS at 14:15

## 2022-01-31 RX ADMIN — NATALIZUMAB 300 MG: 300 INJECTION INTRAVENOUS at 14:15

## 2022-01-31 NOTE — PROGRESS NOTES
Follow Up Office Visit      Date: 2022     Patient Name: Yifan Bowman  MRN: [id number]  : 1975  Referring Physician: Mahesh Elizabeth     Chief Complaint:  Follow-up for iron deficiency anemia     History of Present Illness: Yifan Bowman is a pleasant 46 y.o. female with a past medical history of MS, seizure, iron deficiency anemia who presents today for evaluation of iron deficiency anemia. The patient is accompanied by their significant other who contributes to the history of their care.  Patient has been followed by her neurologist for her MS.  She is currently on Ampyra and tolerating well.  She has a history of iron deficiency anemia and has been on oral iron for the past several months with no significant improvement in any fatigue or cravings for ice.  She recently had iron studies checked which were notable for an iron level of 38, transferrin saturation 9%, ferritin was not checked at that time.  She states she has never received IV iron in the past.  Denies any dark tarry stools or abnormal vaginal bleeding.  Does note a family history of colon cancer in her mother who was diagnosed at 36.  States that she has been seen by Dr. Flores who recommended a colonoscopy for her but she has not scheduled this.  Otherwise she has no major complaints today and is overall doing well     Interval History:  Presents clinic for follow-up.  Continues to tolerate oral iron well.  Denies any worsening fatigue or cravings for ice.  Denies any easy bleeding or bruising episodes.  Remains compliant with her MS infusions    Oncology History:    Oncology/Hematology History    No history exists.       Subjective      Review of Systems:   Constitutional: Negative for fevers, chills, or weight loss  Eyes: Negative for blurred vision or discharge         Ear/Nose/Throat: Negative for difficulty swallowing, sore throat, LAD                                                       Respiratory:  Negative for cough, SOA, wheezing                                                                                        Cardiovascular: Negative for chest pain or palpitations                                                                  Gastrointestinal: Negative for nausea, vomiting or diarrhea                                                                     Genitourinary: Negative for dysuria or hematuria                                                                                           Musculoskeletal: Negative for any joint pains or muscle aches                                                                        Neurologic: Negative for any weakness, headaches, dizziness                                                                         Hematologic: Negative for any easy bleeding or bruising                                                                                   Psychiatric: Negative for anxiety or depression                          Past Medical History/Past Surgical History/ Family History/ Social History: Reviewed by me and unchanged from my previous documentation done on July 21.     Medications:     Current Outpatient Medications:   •  ALPRAZolam (Xanax) 1 MG tablet, Take one tablet 30 minutes prior to MRI and may repeat once if necessary, Disp: 2 tablet, Rfl: 0  •  Ampyra 10 MG tablet sustained-release 12 hour, TAKE 1 TABLET BY MOUTH EVERY 12 HOURS, Disp: 180 tablet, Rfl: 3  •  baclofen (LIORESAL) 20 MG tablet, Take 1 tablet by mouth 3 (Three) Times a Day., Disp: 270 tablet, Rfl: 3  •  Cholecalciferol (Vitamin D3) 1.25 MG (77592 UT) capsule, Take 1 capsule by mouth Every 7 (Seven) Days., Disp: 12 capsule, Rfl: 2  •  ferrous sulfate 325 (65 Fe) MG tablet, Take 325 mg by mouth Daily With Breakfast., Disp: , Rfl:   •  folic acid (FOLVITE) 1 MG tablet, Take 1 tablet by mouth Daily., Disp: 90 tablet, Rfl: 2  •  pregabalin (LYRICA) 100 MG capsule, Take 1 capsule by mouth 2 (Two)  "Times a Day., Disp: 60 capsule, Rfl: 1    Allergies:   Allergies   Allergen Reactions   • Benadryl [Diphenhydramine]        Objective     Physical Exam:  Vital Signs:   Vitals:    01/31/22 1103   BP: 124/81   Pulse: 94   Resp: 16   Temp: 96.6 °F (35.9 °C)   TempSrc: Temporal   SpO2: 99%   Weight: 86.5 kg (190 lb 9.6 oz)   Height: 157.5 cm (62\")   PainSc: 0-No pain     Pain Score    01/31/22 1103   PainSc: 0-No pain     ECOG Performance Status: 0 - Asymptomatic    Constitutional: NAD, ECOG 0  Eyes: PERRLA, scleral anicteric  ENT: No LAD, no thyromegaly  Respiratory: CTAB, no wheezing, rales, rhonchi  Cardiovascular: RRR, no murmurs, pulses 2+ bilaterally  Abdomen: soft, NT/ND, no HSM  Musculoskeletal: strength 5/5 bilaterally, no c/c/e  Neurologic: A&O x 3, CN II-XII intact grossly    Results Review:   No visits with results within 2 Week(s) from this visit.   Latest known visit with results is:   Infusion on 10/22/2021   Component Date Value Ref Range Status   • Index Value 10/22/2021 2.98   Final   • JCV Antibody 10/22/2021 Positive*  Final    Index interpretive criteria:           <0.20 negative       0.20-0.40 indeterminate           >0.40 positive   • Interpretation 10/22/2021 Comment   Final    Comment: Negative: Antibodies to JCV not detected.  Indeterminate: Low level reactivity detected, see Inhibition                 Assay result to follow for the final antibody                 result.  Positive: Antibodies to GELY virus (JCV) detected indicating            the patient has been exposed to JCV at an            undetermined time.  The STRATIFY JCV Antibody Test is an enzyme-linked  immunosorbent assay (INES) designed to detect JCV antibodies  to help identify individuals who have been exposed to the  virus. Samples with low level reactivity in the detection  assay are retested in a confirmation (inhibition) assay to  confirm presence or absence of JCV-specific antibodies.  Retrospective analyses of post " marketing data from various  sources, including observational studies and spontaneous  reports obtained worldwide, suggest that the risk of  developing PML may be associated with relative levels of  serum anti-JCV antibody as measured by anti-JCV antibody  index.1                                                                               .  1TYSABRI (natalizumab) [place] Prescribing Information.   • Interpretation 10/22/2021 Comment   Final    Positive: Antibodies to GELY virus (JCV) detected            indicating the patient has been exposed            to JCV at an undetermined time  Negative: Antibodies to JCV not detected       MRI Brain With & Without Contrast    Result Date: 1/23/2022  Narrative: EXAMINATION: MRI BRAIN WWO CONTRAST - 01/23/2022  INDICATION: G35-Multiple sclerosis. Difficulty walking.  TECHNIQUE: Routine multiplanar imaging is obtained of the brain with and without the administration of gadolinium contrast.  COMPARISON: 05/12/2020  FINDINGS: Stable abnormal signal intensity is seen scattered throughout the periventricular and subcortical white matter involving the corpus callosum. The lesions are stable and unchanged when compared to the prior examination. No significant change is seen when compared to the prior examination. There is no evidence of tumefactive multiple sclerosis. The largest lesion identified extends along the leftward aspect of the corpus callosum into the periventricular white matter, unchanged. Ten to twelve other lesions are identified seen throughout the white matter, all stable and unchanged. There are no signs of progression. The parenchyma is otherwise unremarkable. Visualized paranasal sinuses are clear. Mastoid air cells are patent. Globes and orbits are intact. Mastoid air cells are patent. Pituitary and sella are unremarkable. Craniovertebral junction is preserved.  There is no evidence of restricted diffusion to suggest evidence of an acute ischemic insult. Flow voids  are preserved in the major intracranial vessels. No active plaque present.  Postcontrast enhanced imaging reveals no abnormal areas of contrast enhancement to suggest areas of active demyelination. Visualized vascularity is unremarkable.      Impression: Stable areas of nonenhancing white matter disease again representing patient's known demyelinating white matter process. There are no new lesions and no significant signs of progression. No areas of abnormal contrast to suggest active demyelinating disease.  DICTATED:   01/23/2022 EDITED/lfs:   01/23/2022    This report was finalized on 1/23/2022 7:42 PM by Dr. Alicia Alarcon MD.      MRI Cervical Spine With & Without Contrast    Result Date: 1/23/2022  Narrative: EXAMINATION: MRI CERVICAL SPINE WWO CONTRAST - 01/20/2022  INDICATION: G35-Multiple sclerosis. Increasing leg weakness. Follow-up MS.  TECHNIQUE: Routine multiplanar imaging is obtained of the cervical spine with and without the administration of gadolinium contrast.  COMPARISON: 08/14/2020  FINDINGS: There is straightening of the normal lordosis of the cervical spine. Normal signal intensity seen throughout the vertebral bodies. The craniovertebral junction is preserved. There is abnormal signal intensity identified within the spinal cord posterior to the C4 and C5 levels. The large plaque is stable and unchanged when compared to the prior examination. Additional abnormal signal intensity is seen along the rightward aspect of the cord posterior to the C6 level. Small abnormal signal intensity seen along the leftward aspect of the spinal cord just posterior to the C3 level. There are no new lesions identified. Findings all suggest and represent patient's known developing white matter process. Postcontrast enhanced imaging reveals no definite areas of abnormal contrast enhancement to suggest an active demyelinating white matter process.  Axial imaging reveals at the C3/C4 level, no significant central  spinal canal stenosis or nerve root contact or compromise.  At the C4/C5 level there is a small posterior disc osteophyte complex creating minimal mass effect anteriorly on the thecal sac with no significant central spinal canal stenosis or nerve root contact or compromise.  At the C5/C6 level there is a right posterior disc osteophyte complex creating mass effect anteriorly on the rightward aspect of the thecal sac with no evidence of neural foramina narrowing or nerve root contact or compromise.  At the C6/C7 level there is no significant central spinal canal stenosis or disc protrusion.      Impression: Stable areas of abnormal signal within the cervical cord again suggesting patient's known diagnosis of multiple sclerosis. No active demyelination identified. Minimal degenerative changes stable within the cervical spine most pronounced at the C5/C6 level.  DICTATED:   01/23/2022 EDITED/lfs:   01/23/2022    This report was finalized on 1/23/2022 7:42 PM by Dr. Alicia Alarcon MD.        Assessment / Plan      Assessment/Plan:   1. Iron deficiency anemia secondary to inadequate dietary iron intake (Primary)/2. Malabsorption of iron  -Most recent iron studies in April consistent with iron level 38, transferrin saturation 9%.  Hemoglobin and ferritin not checked at that time  -Repeat iron studies in May 2021 with improving iron studies while on oral iron  -Has now been on oral iron for about 3-4 months and is tolerating well  -Repeat hemoglobin 11.9 in July 2021 which is increased from 10.8 in May 2021  -Repeat CBC and iron studies pending today  -Advised patient to contact Dr. Alejo's office for screening colonoscopy  -On folic acid and vitamin D replacement as below  -Should her iron studies remain elevated, we will plan to discharge her from clinic and allow refills of her oral iron and further labs to be completed by her PCP     2.  Folic acid deficiency  -Noted on labs in May 2021  -Started on  supplementation.  Tolerating well  -Repeat folate level within normal limits in July 2021     3.  Vitamin D deficiency  -Noted on labs in May 2021  -Started on supplementation.  Tolerating well  -Repeat vitamin D level within normal limits in July 2021     4.  MS  -On Ampyra per neurology      Follow Up:   Follow-up pending results of her iron studies     Shaggy Sun MD  Hematology and Oncology     Please note that portions of this note may have been completed with a voice recognition program. Efforts were made to edit the dictations, but occasionally words are mistranscribed.

## 2022-02-07 RX ORDER — BACLOFEN 10 MG/1
TABLET ORAL
Qty: 90 TABLET | Refills: 3 | OUTPATIENT
Start: 2022-02-07

## 2022-02-07 RX ORDER — FOLIC ACID 1 MG/1
1 TABLET ORAL DAILY
Qty: 90 TABLET | Refills: 2 | Status: SHIPPED | OUTPATIENT
Start: 2022-02-07 | End: 2023-01-31 | Stop reason: SDUPTHER

## 2022-02-07 NOTE — TELEPHONE ENCOUNTER
Rx Refill Note  Requested Prescriptions     Pending Prescriptions Disp Refills   • baclofen (LIORESAL) 10 MG tablet [Pharmacy Med Name: BACLOFEN 10MG TABLETS] 90 tablet 3     Sig: TAKE 1 TABLET BY MOUTH THREE TIMES DAILY      Last filled: 10/18/21, 90 day with 3 refill  Should still have refills left on this. Thanks!  Last office visit with prescribing clinician: 10/18/2021      Next office visit with prescribing clinician: 2/11/2022

## 2022-02-11 ENCOUNTER — OFFICE VISIT (OUTPATIENT)
Dept: NEUROLOGY | Facility: CLINIC | Age: 47
End: 2022-02-11

## 2022-02-11 VITALS
HEIGHT: 62 IN | SYSTOLIC BLOOD PRESSURE: 118 MMHG | DIASTOLIC BLOOD PRESSURE: 68 MMHG | OXYGEN SATURATION: 100 % | BODY MASS INDEX: 34.96 KG/M2 | HEART RATE: 52 BPM | WEIGHT: 190 LBS

## 2022-02-11 DIAGNOSIS — G35 MS (MULTIPLE SCLEROSIS): Primary | Chronic | ICD-10-CM

## 2022-02-11 DIAGNOSIS — M21.372 LEFT FOOT DROP: Chronic | ICD-10-CM

## 2022-02-11 DIAGNOSIS — R26.9 GAIT ABNORMALITY: Chronic | ICD-10-CM

## 2022-02-11 PROBLEM — M62.838 MUSCLE SPASTICITY: Status: RESOLVED | Noted: 2018-03-14 | Resolved: 2022-02-11

## 2022-02-11 PROCEDURE — 99214 OFFICE O/P EST MOD 30 MIN: CPT | Performed by: PSYCHIATRY & NEUROLOGY

## 2022-02-11 NOTE — PROGRESS NOTES
"Chief Complaint    Multiple Sclerosis    Subjective          Yifan Bowman presents to [facility]UROLOG[facility]     History of Present Illness    46 y.o. female returns in follow up.  Last visit on 10/18/21  continued Tysabri LEIDY,  Keppra, Iron, Ampyra, rx Baclofen ordered MRI B/C.     MRI Brain/Cervical/thoracic my review of films, 1/20/22 as compared to 8/14/20, no new/enlarging/enhancing T2 PVWM, mild/mod, C4, C6, T5 -  T7 cord lesions     7/26/21 -  CBC,CMP, B12, Vit D - NCS      10/22/21 JCV 2.98    RRMS     Gait increasing spasticity.        Ampyra stable walking.      Fatigue is mild.  Heat intolerance are moderate.       Continued left 1 - 3 digits decrease to mild numbness.      Worsening gait      Left leg spasticity     B LE spasticity and foot drop.              Problem history:     Previously dx and followed by Dr Franklin.  7 years ago fell off porch for unknown reasons.  Left hip is painful and weak.  Pain present when walking.  Left weakness has worsened.  Sx started two months after delivery of 4th child. Band like pain and pressure around lower abdomen.  Noticeable fatigue.  Heat increases sleepiness.  Bladder frequency.  Numbness medial left calf and fingertips.  Started on Aubagio without side effects.       Reviewed Dr Franklin's notes:     Presented on 2/7/17 for left LE pain, numbness and weakness for 6 years.  Also, notes intermittent hand numbness.  MRI results below.  Started on Prednisone and Aubagio 7 mg on 5/4/17  Labs - ACE, Lyme, LUCY, CMP, CBC, TSH, CRP reviewed     My review of films:     MRI Brain 2/15/17 3/30/17  Large left anterior CC black hole, 10 to 15 T2 lesions, multiple CC lesions. No evidence of enhancement     MRI C-spine 3/30/17 C4/5 T2 cord signal w/o enhancement        Objective   Vital Signs:   /68   Pulse 52   Ht 157.5 cm (62.01\")   Wt 86.2 kg (190 lb)   SpO2 100%   BMI 34.74 kg/m²     Physical Exam  Eyes:      Extraocular Movements: " EOM normal.      Pupils: Pupils are equal, round, and reactive to light.   Neurological:      Mental Status: She is oriented to person, place, and time.      Deep Tendon Reflexes:      Reflex Scores:       Tricep reflexes are 3+ on the right side.       Bicep reflexes are 3+ on the right side and 3+ on the left side.       Brachioradialis reflexes are 3+ on the right side and 3+ on the left side.       Patellar reflexes are 4+ on the right side and 4+ on the left side.       Achilles reflexes are 4+ on the right side and 4+ on the left side.  Psychiatric:         Speech: Speech normal.          Neurologic Exam     Mental Status   Oriented to person, place, and time.   Speech: speech is normal   Level of consciousness: alert  Knowledge: good and consistent with education.   Normal comprehension.     Cranial Nerves   Cranial nerves II through XII intact.     CN II   Visual fields full to confrontation.   Visual acuity: normal  Right visual field deficit: none  Left visual field deficit: none     CN III, IV, VI   Pupils are equal, round, and reactive to light.  Extraocular motions are normal.   Nystagmus: none   Diplopia: none  Ophthalmoparesis: none  Upgaze: normal  Downgaze: normal  Conjugate gaze: present    CN V   Facial sensation intact.   Right corneal reflex: normal  Left corneal reflex: normal    CN VII   Right facial weakness: none  Left facial weakness: none    CN VIII   Hearing: intact    CN IX, X   Palate: symmetric  Right gag reflex: normal  Left gag reflex: normal    CN XI   Right sternocleidomastoid strength: normal  Left sternocleidomastoid strength: normal    CN XII   Tongue: not atrophic  Fasciculations: absent  Tongue deviation: none    Motor Exam   Muscle bulk: normal  Overall muscle tone: normal  Right leg tone: spastic  Left leg tone: spastic    Strength   Strength 5/5 except as noted.   Right iliopsoas: 4/5  Left iliopsoas: 4/5  Right quadriceps: 4/5  Left quadriceps: 4/5  Right hamstring:  4/5  Left hamstrin/5  Right glutei: 4/5  Left glutei: 4/5  Right anterior tibial: 3/5  Left anterior tibial: 2/5  Right posterior tibial: 3/5  Left posterior tibial: 2/5  Right peroneal: 3/5  Left peroneal: 2/5  Right gastroc: 3/5  Left gastroc: 2/5    Sensory Exam   Light touch normal.     Gait, Coordination, and Reflexes     Gait  Gait: spastic    Tremor   Resting tremor: absent  Intention tremor: absent  Action tremor: absent    Reflexes   Right brachioradialis: 3+  Left brachioradialis: 3+  Right biceps: 3+  Left biceps: 3+  Right triceps: 3+  Right patellar: 4+  Left patellar: 4+  Right achilles: 4+  Left achilles: 4+     Result Review :   The following data was reviewed by: Richard Coronado MD on 2022:  Common labs    Common Labsle 21     1229 1229    Glucose   101 (A)    BUN   8    Creatinine   0.81    eGFR African Am   93    Sodium   136    Potassium   3.9    Chloride   101    Calcium   9.4    Albumin   4.00    Total Bilirubin   0.4    Alkaline Phosphatase   114    AST (SGOT)   14    ALT (SGPT)   11    WBC 10.20 10.20  7.96   Hemoglobin 10.8 (A) 11.9 (A)  12.1   Hematocrit 32.6 (A) 35.6  36.2   Platelets 407 363  315   (A) Abnormal value            Data reviewed: Radiologic studies MRI B/C          Assessment and Plan {CC Problem List  Visit Diagnosis   ROS  Review (Popup)  Health Maintenance  Quality  BestPractice  Medications  SmartSets  SnapShot Encounters  Media :23}   Diagnoses and all orders for this visit:    1. MS (multiple sclerosis) (HCC) (Primary)  Assessment & Plan:  Continue Tysabir    CBC,CMP, JCV     Orders:  -     Comprehensive Metabolic Panel; Future  -     Stratify JCV, Antibody INES With / Reflex To Inhibition Assay ([facility]); Future    2. Left foot drop  Assessment & Plan:  AFO      3. Gait abnormality  Assessment & Plan:  Continue Ampyra         Follow Up   No follow-ups on file.  Patient was given instructions and counseling regarding  her condition or for health maintenance advice. Please see specific information pulled into the AVS if appropriate.

## 2022-02-15 RX ORDER — CHOLECALCIFEROL (VITAMIN D3) 1250 MCG
CAPSULE ORAL
Qty: 12 CAPSULE | Refills: 2 | Status: SHIPPED | OUTPATIENT
Start: 2022-02-15

## 2022-02-21 ENCOUNTER — DOCUMENTATION (OUTPATIENT)
Dept: ONCOLOGY | Facility: HOSPITAL | Age: 47
End: 2022-02-21

## 2022-02-21 ENCOUNTER — SPECIALTY PHARMACY (OUTPATIENT)
Dept: ONCOLOGY | Facility: HOSPITAL | Age: 47
End: 2022-02-21

## 2022-02-21 RX ORDER — DALFAMPRIDINE 10 MG/1
1 TABLET, FILM COATED, EXTENDED RELEASE ORAL EVERY 12 HOURS
Qty: 180 TABLET | Refills: 3 | Status: SHIPPED | OUTPATIENT
Start: 2022-02-21

## 2022-03-14 ENCOUNTER — INFUSION (OUTPATIENT)
Dept: ONCOLOGY | Facility: HOSPITAL | Age: 47
End: 2022-03-14

## 2022-03-14 VITALS
DIASTOLIC BLOOD PRESSURE: 79 MMHG | SYSTOLIC BLOOD PRESSURE: 139 MMHG | HEART RATE: 99 BPM | TEMPERATURE: 97.5 F | RESPIRATION RATE: 16 BRPM

## 2022-03-14 DIAGNOSIS — G35 MS (MULTIPLE SCLEROSIS): Primary | ICD-10-CM

## 2022-03-14 LAB
ALBUMIN SERPL-MCNC: 4.1 G/DL (ref 3.5–5.2)
ALBUMIN/GLOB SERPL: 1.2 G/DL
ALP SERPL-CCNC: 103 U/L (ref 39–117)
ALT SERPL W P-5'-P-CCNC: 7 U/L (ref 1–33)
ANION GAP SERPL CALCULATED.3IONS-SCNC: 9 MMOL/L (ref 5–15)
AST SERPL-CCNC: 18 U/L (ref 1–32)
BILIRUB SERPL-MCNC: 0.3 MG/DL (ref 0–1.2)
BUN SERPL-MCNC: 9 MG/DL (ref 6–20)
BUN/CREAT SERPL: 12.3 (ref 7–25)
CALCIUM SPEC-SCNC: 9.6 MG/DL (ref 8.6–10.5)
CHLORIDE SERPL-SCNC: 101 MMOL/L (ref 98–107)
CO2 SERPL-SCNC: 28 MMOL/L (ref 22–29)
CREAT SERPL-MCNC: 0.73 MG/DL (ref 0.57–1)
EGFRCR SERPLBLD CKD-EPI 2021: 104.1 ML/MIN/1.73
GLOBULIN UR ELPH-MCNC: 3.5 GM/DL
GLUCOSE SERPL-MCNC: 97 MG/DL (ref 65–99)
POTASSIUM SERPL-SCNC: 3.8 MMOL/L (ref 3.5–5.2)
PROT SERPL-MCNC: 7.6 G/DL (ref 6–8.5)
SODIUM SERPL-SCNC: 138 MMOL/L (ref 136–145)

## 2022-03-14 PROCEDURE — 96365 THER/PROPH/DIAG IV INF INIT: CPT

## 2022-03-14 PROCEDURE — 25010000002 NATALIZUMAB PER 1 MG: Performed by: NURSE PRACTITIONER

## 2022-03-14 PROCEDURE — 80053 COMPREHEN METABOLIC PANEL: CPT

## 2022-03-14 RX ORDER — ACETAMINOPHEN 325 MG/1
650 TABLET ORAL ONCE
Status: COMPLETED | OUTPATIENT
Start: 2022-03-14 | End: 2022-03-14

## 2022-03-14 RX ORDER — SODIUM CHLORIDE 9 MG/ML
250 INJECTION, SOLUTION INTRAVENOUS ONCE
Status: CANCELLED | OUTPATIENT
Start: 2022-04-25

## 2022-03-14 RX ORDER — ACETAMINOPHEN 325 MG/1
650 TABLET ORAL ONCE
Status: CANCELLED | OUTPATIENT
Start: 2022-04-25

## 2022-03-14 RX ORDER — CETIRIZINE HYDROCHLORIDE 10 MG/1
10 TABLET ORAL ONCE
Status: COMPLETED | OUTPATIENT
Start: 2022-03-14 | End: 2022-03-14

## 2022-03-14 RX ORDER — CETIRIZINE HYDROCHLORIDE 10 MG/1
10 TABLET ORAL ONCE
Status: CANCELLED
Start: 2022-04-25 | End: 2022-04-25

## 2022-03-14 RX ADMIN — ACETAMINOPHEN 650 MG: 325 TABLET ORAL at 10:43

## 2022-03-14 RX ADMIN — CETIRIZINE HYDROCHLORIDE 10 MG: 10 TABLET, FILM COATED ORAL at 10:43

## 2022-03-14 RX ADMIN — NATALIZUMAB 300 MG: 300 INJECTION INTRAVENOUS at 10:45

## 2022-04-19 ENCOUNTER — DOCUMENTATION (OUTPATIENT)
Dept: ONCOLOGY | Facility: HOSPITAL | Age: 47
End: 2022-04-19

## 2022-04-25 ENCOUNTER — INFUSION (OUTPATIENT)
Dept: ONCOLOGY | Facility: HOSPITAL | Age: 47
End: 2022-04-25

## 2022-04-25 VITALS
DIASTOLIC BLOOD PRESSURE: 73 MMHG | RESPIRATION RATE: 16 BRPM | SYSTOLIC BLOOD PRESSURE: 119 MMHG | HEART RATE: 107 BPM | TEMPERATURE: 97.6 F

## 2022-04-25 DIAGNOSIS — G35 MS (MULTIPLE SCLEROSIS): Primary | ICD-10-CM

## 2022-04-25 PROCEDURE — 96365 THER/PROPH/DIAG IV INF INIT: CPT

## 2022-04-25 PROCEDURE — 25010000002 NATALIZUMAB PER 1 MG: Performed by: NURSE PRACTITIONER

## 2022-04-25 RX ORDER — CETIRIZINE HYDROCHLORIDE 10 MG/1
10 TABLET ORAL ONCE
Status: COMPLETED | OUTPATIENT
Start: 2022-04-25 | End: 2022-04-25

## 2022-04-25 RX ORDER — ACETAMINOPHEN 325 MG/1
650 TABLET ORAL ONCE
Status: COMPLETED | OUTPATIENT
Start: 2022-04-25 | End: 2022-04-25

## 2022-04-25 RX ORDER — SODIUM CHLORIDE 9 MG/ML
250 INJECTION, SOLUTION INTRAVENOUS ONCE
Status: CANCELLED | OUTPATIENT
Start: 2022-06-06

## 2022-04-25 RX ORDER — CETIRIZINE HYDROCHLORIDE 10 MG/1
10 TABLET ORAL ONCE
Status: CANCELLED
Start: 2022-06-06 | End: 2022-06-06

## 2022-04-25 RX ORDER — ACETAMINOPHEN 325 MG/1
650 TABLET ORAL ONCE
Status: CANCELLED | OUTPATIENT
Start: 2022-06-06

## 2022-04-25 RX ADMIN — CETIRIZINE HYDROCHLORIDE 10 MG: 10 TABLET, FILM COATED ORAL at 09:19

## 2022-04-25 RX ADMIN — ACETAMINOPHEN 650 MG: 325 TABLET ORAL at 09:19

## 2022-04-25 RX ADMIN — NATALIZUMAB 300 MG: 300 INJECTION INTRAVENOUS at 09:21

## 2022-07-15 RX ORDER — ACETAMINOPHEN 325 MG/1
650 TABLET ORAL ONCE
Status: CANCELLED | OUTPATIENT
Start: 2022-07-15

## 2022-07-15 RX ORDER — SODIUM CHLORIDE 9 MG/ML
250 INJECTION, SOLUTION INTRAVENOUS ONCE
Status: CANCELLED | OUTPATIENT
Start: 2022-07-15

## 2022-08-08 ENCOUNTER — INFUSION (OUTPATIENT)
Dept: ONCOLOGY | Facility: HOSPITAL | Age: 47
End: 2022-08-08

## 2022-08-08 VITALS
WEIGHT: 180 LBS | DIASTOLIC BLOOD PRESSURE: 81 MMHG | HEART RATE: 95 BPM | TEMPERATURE: 98.1 F | BODY MASS INDEX: 33.13 KG/M2 | RESPIRATION RATE: 16 BRPM | SYSTOLIC BLOOD PRESSURE: 135 MMHG | HEIGHT: 62 IN

## 2022-08-08 DIAGNOSIS — G35 MS (MULTIPLE SCLEROSIS): Primary | ICD-10-CM

## 2022-08-08 PROCEDURE — 96365 THER/PROPH/DIAG IV INF INIT: CPT

## 2022-08-08 PROCEDURE — 96375 TX/PRO/DX INJ NEW DRUG ADDON: CPT

## 2022-08-08 PROCEDURE — 25010000002 NATALIZUMAB PER 1 MG: Performed by: PSYCHIATRY & NEUROLOGY

## 2022-08-08 RX ORDER — ACETAMINOPHEN 325 MG/1
650 TABLET ORAL ONCE
Status: CANCELLED | OUTPATIENT
Start: 2022-08-29

## 2022-08-08 RX ORDER — CETIRIZINE HYDROCHLORIDE 10 MG/1
10 TABLET ORAL ONCE
Status: CANCELLED
Start: 2022-08-29 | End: 2022-08-29

## 2022-08-08 RX ORDER — CETIRIZINE HYDROCHLORIDE 10 MG/1
10 TABLET ORAL ONCE
Status: COMPLETED | OUTPATIENT
Start: 2022-08-08 | End: 2022-08-08

## 2022-08-08 RX ORDER — SODIUM CHLORIDE 9 MG/ML
250 INJECTION, SOLUTION INTRAVENOUS ONCE
Status: CANCELLED | OUTPATIENT
Start: 2022-08-29

## 2022-08-08 RX ORDER — ACETAMINOPHEN 325 MG/1
650 TABLET ORAL ONCE
Status: COMPLETED | OUTPATIENT
Start: 2022-08-08 | End: 2022-08-08

## 2022-08-08 RX ADMIN — CETIRIZINE HYDROCHLORIDE 10 MG: 10 TABLET, FILM COATED ORAL at 10:06

## 2022-08-08 RX ADMIN — NATALIZUMAB 300 MG: 300 INJECTION INTRAVENOUS at 10:13

## 2022-08-08 RX ADMIN — ACETAMINOPHEN 650 MG: 325 TABLET ORAL at 10:06

## 2022-08-10 ENCOUNTER — DOCUMENTATION (OUTPATIENT)
Dept: ONCOLOGY | Facility: HOSPITAL | Age: 47
End: 2022-08-10

## 2022-09-14 RX ORDER — ACETAMINOPHEN 325 MG/1
650 TABLET ORAL ONCE
Status: CANCELLED | OUTPATIENT
Start: 2022-09-14

## 2022-09-14 RX ORDER — ACETAMINOPHEN 325 MG/1
650 TABLET ORAL ONCE
OUTPATIENT
Start: 2022-09-14

## 2022-09-14 RX ORDER — SODIUM CHLORIDE 9 MG/ML
250 INJECTION, SOLUTION INTRAVENOUS ONCE
OUTPATIENT
Start: 2022-09-14

## 2023-01-11 ENCOUNTER — TELEPHONE (OUTPATIENT)
Dept: NEUROLOGY | Facility: CLINIC | Age: 48
End: 2023-01-11

## 2023-01-11 NOTE — TELEPHONE ENCOUNTER
Provider: DR ELLIS  Caller: DERREK WITH [employer]  Phone Number: [phone] X [phone]  Reason for Call: DERREK CALLED REGARDING GETTING PATIENTS REAUTHORIZATION QUESTIONNAIRE FOR TYSABRI 300MG FILLED OUT. SHE STATES IT WAS FAX TO THE OFFICE FAX # [phone]. SHE STATED THAT IF THE PATIENT IS NO LONGER RECEIVING THE TYSABRI THE FORM CAN ALSO BE USED AS THE DISCONTINUATION NOTICE.    PLEASE REVIEW AND ADVISE. THANK YOU.

## 2023-01-31 RX ORDER — FOLIC ACID 1 MG/1
1 TABLET ORAL DAILY
Qty: 90 TABLET | Refills: 2 | Status: SHIPPED | OUTPATIENT
Start: 2023-01-31

## 2023-04-17 ENCOUNTER — SPECIALTY PHARMACY (OUTPATIENT)
Dept: ONCOLOGY | Facility: HOSPITAL | Age: 48
End: 2023-04-17
Payer: COMMERCIAL

## 2024-10-25 ENCOUNTER — HOSPITAL ENCOUNTER (EMERGENCY)
Facility: HOSPITAL | Age: 49
Discharge: HOME | End: 2024-10-26
Attending: EMERGENCY MEDICINE
Payer: COMMERCIAL

## 2024-10-25 ENCOUNTER — APPOINTMENT (OUTPATIENT)
Dept: GENERAL RADIOLOGY | Facility: HOSPITAL | Age: 49
End: 2024-10-25
Payer: COMMERCIAL

## 2024-10-25 VITALS
SYSTOLIC BLOOD PRESSURE: 142 MMHG | RESPIRATION RATE: 18 BRPM | DIASTOLIC BLOOD PRESSURE: 86 MMHG | HEART RATE: 88 BPM | WEIGHT: 200 LBS | BODY MASS INDEX: 36.8 KG/M2 | TEMPERATURE: 98.2 F | HEIGHT: 62 IN | OXYGEN SATURATION: 98 %

## 2024-10-25 DIAGNOSIS — M54.50 ACUTE MIDLINE LOW BACK PAIN WITHOUT SCIATICA: ICD-10-CM

## 2024-10-25 DIAGNOSIS — S83.91XA SPRAIN OF RIGHT KNEE, UNSPECIFIED LIGAMENT, INITIAL ENCOUNTER: ICD-10-CM

## 2024-10-25 DIAGNOSIS — S13.4XXA WHIPLASH INJURY TO NECK, INITIAL ENCOUNTER: ICD-10-CM
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
## 2024-10-25 PROCEDURE — 73560 X-RAY EXAM OF KNEE 1 OR 2: CPT

## 2024-10-25 PROCEDURE — 99283 EMERGENCY DEPT VISIT LOW MDM: CPT

## 2024-10-25 PROCEDURE — 72100 X-RAY EXAM L-S SPINE 2/3 VWS: CPT

## 2024-10-25 RX ORDER — IBUPROFEN 800 MG/1
800 TABLET, FILM COATED ORAL ONCE
Status: COMPLETED | OUTPATIENT
Start: 2024-10-25 | End: 2024-10-26

## 2024-10-25 RX ORDER — ACETAMINOPHEN 500 MG
1000 TABLET ORAL ONCE
Status: COMPLETED | OUTPATIENT
Start: 2024-10-25 | End: 2024-10-26

## 2024-10-26 RX ADMIN — ACETAMINOPHEN 1000 MG: 500 TABLET ORAL at 00:08

## 2024-10-26 RX ADMIN — IBUPROFEN 800 MG: 800 TABLET, FILM COATED ORAL at 00:08
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
